# Patient Record
Sex: FEMALE | Race: WHITE
[De-identification: names, ages, dates, MRNs, and addresses within clinical notes are randomized per-mention and may not be internally consistent; named-entity substitution may affect disease eponyms.]

---

## 2017-01-19 ENCOUNTER — PRIOR ORIGINAL RECORDS (OUTPATIENT)
Dept: OTHER | Age: 52
End: 2017-01-19

## 2017-01-21 ENCOUNTER — LAB ENCOUNTER (OUTPATIENT)
Dept: LAB | Age: 52
End: 2017-01-21
Attending: INTERNAL MEDICINE
Payer: COMMERCIAL

## 2017-01-21 DIAGNOSIS — Z94.1 STATUS POST TRANSPLANT, HEART (HCC): Primary | ICD-10-CM

## 2017-01-21 PROCEDURE — 80197 ASSAY OF TACROLIMUS: CPT

## 2017-01-24 ENCOUNTER — PRIOR ORIGINAL RECORDS (OUTPATIENT)
Dept: OTHER | Age: 52
End: 2017-01-24

## 2017-01-24 RX ORDER — MELATONIN
1 2 TIMES DAILY
COMMUNITY

## 2017-01-24 RX ORDER — VALGANCICLOVIR 450 MG/1
450 TABLET, FILM COATED ORAL DAILY
COMMUNITY
End: 2017-09-07 | Stop reason: ALTCHOICE

## 2017-01-24 RX ORDER — MAGNESIUM OXIDE 400 MG (241.3 MG MAGNESIUM) TABLET
800 TABLET 2 TIMES DAILY
COMMUNITY

## 2017-01-24 RX ORDER — DOXEPIN HYDROCHLORIDE 50 MG/1
1 CAPSULE ORAL DAILY
COMMUNITY

## 2017-01-24 RX ORDER — ZINC SULFATE 50(220)MG
220 CAPSULE ORAL DAILY
COMMUNITY
End: 2017-09-07

## 2017-01-24 RX ORDER — PREDNISONE 1 MG/1
7.5 TABLET ORAL 2 TIMES DAILY WITH MEALS
COMMUNITY
End: 2017-09-07 | Stop reason: DRUGHIGH

## 2017-01-24 RX ORDER — MYCOPHENOLATE MOFETIL 500 MG/1
500 TABLET ORAL 2 TIMES DAILY
COMMUNITY
End: 2018-08-30

## 2017-01-24 RX ORDER — PANTOPRAZOLE SODIUM 40 MG/1
40 TABLET, DELAYED RELEASE ORAL
COMMUNITY
End: 2017-04-24

## 2017-01-24 RX ORDER — ERGOCALCIFEROL (VITAMIN D2) 1250 MCG
50000 CAPSULE ORAL WEEKLY
COMMUNITY
End: 2017-04-24

## 2017-01-24 RX ORDER — ATORVASTATIN CALCIUM 20 MG/1
20 TABLET, FILM COATED ORAL NIGHTLY
COMMUNITY

## 2017-01-24 RX ORDER — FUROSEMIDE 40 MG/1
40 TABLET ORAL DAILY
COMMUNITY
End: 2017-02-22

## 2017-01-24 RX ORDER — TACROLIMUS 0.5 MG/1
3.5 CAPSULE ORAL DAILY
COMMUNITY
End: 2017-04-24

## 2017-01-24 NOTE — H&P
: 1965  ACCOUNT:  [id number]  [phone]  PCP: Dr. Kindra Cooper     TODAY'S DATE: 2017  DICTATED BY:  Lili Verdugo M.D.]    CHIEF COMPLAINT: [S/P heart transplant.]    HPI: [On 2017, Gideon Ingram, a 46year old female, prese or dizziness. HEM/LYMPH: easy bruising. ALL: no new food or enviornmental allergies.       PAST HISTORY: hypothyroidism, cholecystectomy 1990 and lithotripsy 1995    PAST CV HISTORY: [address] ICD 2014, cardiac catheterization January 2016, cat folate deficiency anemias  10. Other nonrheumatic tricuspid valve disorders  11. Other restrictive cardiomyopathy  12. Thrombcytopenia, Heparin-induced  13.  Tricuspid regurgitation, S/P reapir at [facility] [address] [facility]:  [We have scheduled Landon for right ventricula tablet daily                           Palak Izaguirre M.D.  FROYLAN/tere - DD: 01/19/2017 - DT: 01/20/2017 - Job ID: [id number] - c: Dr. Pérez Forth     No changes, plans for biopsy today.   [employer] NP      Palak Izaguirre M.D., F.A.C.C., F.A.H.A.,

## 2017-01-25 ENCOUNTER — PRIOR ORIGINAL RECORDS (OUTPATIENT)
Dept: OTHER | Age: 52
End: 2017-01-25

## 2017-01-25 ENCOUNTER — HOSPITAL ENCOUNTER (OUTPATIENT)
Dept: LAB | Facility: HOSPITAL | Age: 52
Discharge: HOME | End: 2017-01-25
Attending: INTERNAL MEDICINE
Payer: COMMERCIAL

## 2017-01-25 LAB
ALBUMIN SERPL-MCNC: 2.9 G/DL (ref 3.5–4.8)
ALP LIVER SERPL-CCNC: 266 U/L (ref 41–108)
ALT SERPL-CCNC: 50 U/L (ref 14–54)
AST SERPL-CCNC: 30 U/L (ref 15–41)
BASOPHILS # BLD AUTO: 0.02 X10(3) UL (ref 0–0.1)
BASOPHILS NFR BLD AUTO: 0.2 %
BILIRUB SERPL-MCNC: 0.5 MG/DL (ref 0.1–2)
BUN BLD-MCNC: 53 MG/DL (ref 8–20)
CALCIUM BLD-MCNC: 8.6 MG/DL (ref 8.3–10.3)
CHLORIDE: 105 MMOL/L (ref 101–111)
CO2: 30 MMOL/L (ref 22–32)
CREAT BLD-MCNC: 2.65 MG/DL (ref 0.55–1.02)
EOSINOPHIL # BLD AUTO: 0.01 X10(3) UL (ref 0–0.3)
EOSINOPHIL NFR BLD AUTO: 0.1 %
ERYTHROCYTE [DISTWIDTH] IN BLOOD BY AUTOMATED COUNT: 17.2 % (ref 11.5–16)
GLUCOSE BLD-MCNC: 99 MG/DL (ref 70–99)
HAV IGM SER QL: 1.7 MG/DL (ref 1.7–3)
HCT VFR BLD AUTO: 30.9 % (ref 34–50)
HGB BLD-MCNC: 9.8 G/DL (ref 12–16)
IMMATURE GRANULOCYTE COUNT: 0.13 X10(3) UL (ref 0–1)
IMMATURE GRANULOCYTE RATIO %: 1.1 %
LYMPHOCYTES # BLD AUTO: 0.72 X10(3) UL (ref 0.9–4)
LYMPHOCYTES NFR BLD AUTO: 6.3 %
M PROTEIN MFR SERPL ELPH: 6.7 G/DL (ref 6.1–8.3)
MCH RBC QN AUTO: 29.6 PG (ref 27–33.2)
MCHC RBC AUTO-ENTMCNC: 31.7 G/DL (ref 31–37)
MCV RBC AUTO: 93.4 FL (ref 81–100)
MONOCYTES # BLD AUTO: 0.23 X10(3) UL (ref 0.1–0.6)
MONOCYTES NFR BLD AUTO: 2 %
NEUTROPHIL ABS PRELIM: 10.34 X10 (3) UL (ref 1.3–6.7)
NEUTROPHILS # BLD AUTO: 10.34 X10(3) UL (ref 1.3–6.7)
NEUTROPHILS NFR BLD AUTO: 90.3 %
PHOSPHATE SERPL-MCNC: 3 MG/DL (ref 2.5–4.9)
PLATELET # BLD AUTO: 221 10(3)UL (ref 150–450)
POTASSIUM SERPL-SCNC: 4.4 MMOL/L (ref 3.6–5.1)
RBC # BLD AUTO: 3.31 X10(6)UL (ref 3.8–5.1)
RED CELL DISTRIBUTION WIDTH-SD: 58.2 FL (ref 35.1–46.3)
SODIUM SERPL-SCNC: 142 MMOL/L (ref 136–144)
WBC # BLD AUTO: 11.5 X10(3) UL (ref 4–13)

## 2017-01-25 PROCEDURE — 85025 COMPLETE CBC W/AUTO DIFF WBC: CPT | Performed by: INTERNAL MEDICINE

## 2017-01-25 PROCEDURE — 83735 ASSAY OF MAGNESIUM: CPT | Performed by: INTERNAL MEDICINE

## 2017-01-25 PROCEDURE — 80197 ASSAY OF TACROLIMUS: CPT | Performed by: INTERNAL MEDICINE

## 2017-01-25 PROCEDURE — 36415 COLL VENOUS BLD VENIPUNCTURE: CPT | Performed by: INTERNAL MEDICINE

## 2017-01-25 PROCEDURE — 80053 COMPREHEN METABOLIC PANEL: CPT | Performed by: INTERNAL MEDICINE

## 2017-01-25 PROCEDURE — 84100 ASSAY OF PHOSPHORUS: CPT | Performed by: INTERNAL MEDICINE

## 2017-01-26 ENCOUNTER — HOSPITAL ENCOUNTER (OUTPATIENT)
Dept: INTERVENTIONAL RADIOLOGY/VASCULAR | Facility: HOSPITAL | Age: 52
Discharge: HOME | End: 2017-01-26
Attending: INTERNAL MEDICINE | Admitting: INTERNAL MEDICINE
Payer: COMMERCIAL

## 2017-01-26 VITALS
WEIGHT: 172 LBS | OXYGEN SATURATION: 99 % | HEIGHT: 63 IN | HEART RATE: 77 BPM | DIASTOLIC BLOOD PRESSURE: 67 MMHG | BODY MASS INDEX: 30.48 KG/M2 | TEMPERATURE: 98 F | RESPIRATION RATE: 17 BRPM | SYSTOLIC BLOOD PRESSURE: 136 MMHG

## 2017-01-26 DIAGNOSIS — I50.9 HF (HEART FAILURE) (HCC): ICD-10-CM

## 2017-01-26 DIAGNOSIS — Z94.1 HEART TRANSPLANTED (HCC): ICD-10-CM

## 2017-01-26 LAB — TACROLIMUS: 10.1 NG/ML

## 2017-01-26 PROCEDURE — 88348 ELECTRON MICROSCOPY DX: CPT | Performed by: INTERNAL MEDICINE

## 2017-01-26 PROCEDURE — 93451 RIGHT HEART CATH: CPT

## 2017-01-26 PROCEDURE — 02BK3ZX EXCISION OF RIGHT VENTRICLE, PERCUTANEOUS APPROACH, DIAGNOSTIC: ICD-10-PCS | Performed by: COUNSELOR

## 2017-01-26 PROCEDURE — 88307 TISSUE EXAM BY PATHOLOGIST: CPT | Performed by: INTERNAL MEDICINE

## 2017-01-26 PROCEDURE — 4A023N6 MEASUREMENT OF CARDIAC SAMPLING AND PRESSURE, RIGHT HEART, PERCUTANEOUS APPROACH: ICD-10-PCS | Performed by: COUNSELOR

## 2017-01-26 PROCEDURE — 99152 MOD SED SAME PHYS/QHP 5/>YRS: CPT

## 2017-01-26 PROCEDURE — 93505 ENDOMYOCARDIAL BIOPSY: CPT

## 2017-01-26 RX ORDER — HEPARIN SODIUM 5000 [USP'U]/ML
INJECTION, SOLUTION INTRAVENOUS; SUBCUTANEOUS
Status: DISCONTINUED
Start: 2017-01-26 | End: 2017-01-26 | Stop reason: WASHOUT

## 2017-01-26 RX ORDER — SODIUM CHLORIDE 9 MG/ML
INJECTION, SOLUTION INTRAVENOUS CONTINUOUS
Status: DISCONTINUED | OUTPATIENT
Start: 2017-01-26 | End: 2017-01-26

## 2017-01-26 RX ORDER — LIDOCAINE HYDROCHLORIDE 10 MG/ML
INJECTION, SOLUTION INFILTRATION; PERINEURAL
Status: COMPLETED
Start: 2017-01-26 | End: 2017-01-26

## 2017-01-26 RX ORDER — MIDAZOLAM HYDROCHLORIDE 1 MG/ML
INJECTION INTRAMUSCULAR; INTRAVENOUS
Status: COMPLETED
Start: 2017-01-26 | End: 2017-01-26

## 2017-01-26 RX ADMIN — SODIUM CHLORIDE: 9 INJECTION, SOLUTION INTRAVENOUS at 08:15:00

## 2017-01-26 NOTE — OPERATIVE REPORT
RHC with biopsy    Procedures:    Right Heart Catheterization  Right Ventricular Endomyocardial Biopsy    Indication:  Rejection Surveillance and Post-OHT Hemodynamic evaluation    Access Site: Right Internal Jugular Vein    Sheath Size (Fr): 7    Complica

## 2017-01-26 NOTE — PROGRESS NOTES
Rc'd pt from cath lab in stable condition. VSS. See flowsheet. Pt denies c/o pain or discomfort. Dressing to right neck with scant drop of serrous sangenous drainage. No increase in drainage throughout stay. Skin soft, no hematoma.  Pt ambulated to [facility]

## 2017-01-27 ENCOUNTER — PRIOR ORIGINAL RECORDS (OUTPATIENT)
Dept: OTHER | Age: 52
End: 2017-01-27

## 2017-01-31 ENCOUNTER — PRIOR ORIGINAL RECORDS (OUTPATIENT)
Dept: OTHER | Age: 52
End: 2017-01-31

## 2017-02-01 LAB
ALBUMIN: 2.9 G/DL
ALKALINE PHOSPHATATE(ALK PHOS): 266 IU/L
BILIRUBIN TOTAL: 0.5 MG/DL
BUN: 53 MG/DL
CALCIUM: 8.6 MG/DL
CHLORIDE: 105 MEQ/L
CREATININE, SERUM: 2.65 MG/DL
GLUCOSE: 99 MG/DL
HEMATOCRIT: 30.9 %
HEMOGLOBIN: 9.8 G/DL
MAGNESIUM: 1.7 MG/DL
PLATELETS: 221 K/UL
POTASSIUM, SERUM: 4.4 MEQ/L
PROTEIN, TOTAL: 6.7 G/DL
RED BLOOD COUNT: 3.31 X 10-6/U
SGOT (AST): 30 IU/L
SGPT (ALT): 50 IU/L
SODIUM: 142 MEQ/L
TACROLIMUS TROUGH LEVEL: 10.1
WHITE BLOOD COUNT: 11.5 X 10-3/U

## 2017-02-02 ENCOUNTER — PRIOR ORIGINAL RECORDS (OUTPATIENT)
Dept: OTHER | Age: 52
End: 2017-02-02

## 2017-02-03 ENCOUNTER — PRIOR ORIGINAL RECORDS (OUTPATIENT)
Dept: OTHER | Age: 52
End: 2017-02-03

## 2017-02-03 ENCOUNTER — HOSPITAL ENCOUNTER (OUTPATIENT)
Dept: LAB | Facility: HOSPITAL | Age: 52
Discharge: HOME | End: 2017-02-03
Attending: NURSE PRACTITIONER
Payer: COMMERCIAL

## 2017-02-03 LAB
BUN BLD-MCNC: 45 MG/DL (ref 8–20)
CALCIUM BLD-MCNC: 8.5 MG/DL (ref 8.3–10.3)
CHLORIDE: 109 MMOL/L (ref 101–111)
CO2: 33 MMOL/L (ref 22–32)
CREAT BLD-MCNC: 2 MG/DL (ref 0.55–1.02)
GLUCOSE BLD-MCNC: 101 MG/DL (ref 70–99)
POTASSIUM SERPL-SCNC: 4.6 MMOL/L (ref 3.6–5.1)
SODIUM SERPL-SCNC: 146 MMOL/L (ref 136–144)
T3 SERPL-MCNC: 32 NG/DL (ref 60–181)
T3FREE SERPL-MCNC: 1.31 PG/ML (ref 2.3–4.2)
THYROXINE (T4): 6.3 UG/DL (ref 4.5–10.9)
TROPONIN: 0.06 NG/ML (ref ?–0.05)
TSI SER-ACNC: 2.42 MIU/ML (ref 0.35–5.5)

## 2017-02-03 PROCEDURE — 80048 BASIC METABOLIC PNL TOTAL CA: CPT | Performed by: INTERNAL MEDICINE

## 2017-02-03 PROCEDURE — 84436 ASSAY OF TOTAL THYROXINE: CPT | Performed by: INTERNAL MEDICINE

## 2017-02-03 PROCEDURE — 36415 COLL VENOUS BLD VENIPUNCTURE: CPT | Performed by: INTERNAL MEDICINE

## 2017-02-03 PROCEDURE — 84484 ASSAY OF TROPONIN QUANT: CPT | Performed by: INTERNAL MEDICINE

## 2017-02-03 PROCEDURE — 80197 ASSAY OF TACROLIMUS: CPT | Performed by: INTERNAL MEDICINE

## 2017-02-03 PROCEDURE — 84443 ASSAY THYROID STIM HORMONE: CPT | Performed by: INTERNAL MEDICINE

## 2017-02-03 PROCEDURE — 84481 FREE ASSAY (FT-3): CPT | Performed by: INTERNAL MEDICINE

## 2017-02-03 PROCEDURE — 84480 ASSAY TRIIODOTHYRONINE (T3): CPT | Performed by: INTERNAL MEDICINE

## 2017-02-04 LAB — TACROLIMUS: 12.3 NG/ML

## 2017-02-07 ENCOUNTER — PRIOR ORIGINAL RECORDS (OUTPATIENT)
Dept: OTHER | Age: 52
End: 2017-02-07

## 2017-02-10 LAB
BUN: 45 MG/DL
CALCIUM: 8.5 MG/DL
CHLORIDE: 109 MEQ/L
CREATININE, SERUM: 2 MG/DL
GLUCOSE: 101 MG/DL
POTASSIUM, SERUM: 4.6 MEQ/L
SODIUM: 146 MEQ/L
TACROLIMUS TROUGH LEVEL: 12.3

## 2017-02-13 LAB
T4(THYROXINE): 6.3 MG/DL
THYROID STIMULATING HORMONE: 2.42 MLU/L

## 2017-02-18 ENCOUNTER — LAB ENCOUNTER (OUTPATIENT)
Dept: LAB | Facility: HOSPITAL | Age: 52
End: 2017-02-18
Attending: NURSE PRACTITIONER
Payer: COMMERCIAL

## 2017-02-18 ENCOUNTER — PRIOR ORIGINAL RECORDS (OUTPATIENT)
Dept: OTHER | Age: 52
End: 2017-02-18

## 2017-02-18 DIAGNOSIS — Z94.1 HEART REPLACED BY TRANSPLANT (HCC): Primary | ICD-10-CM

## 2017-02-18 LAB
ALBUMIN SERPL-MCNC: 2.8 G/DL (ref 3.5–4.8)
ALP LIVER SERPL-CCNC: 159 U/L (ref 41–108)
ALT SERPL-CCNC: 33 U/L (ref 14–54)
AST SERPL-CCNC: 24 U/L (ref 15–41)
BAND %: 8 %
BASOPHIL % MANUAL: 0 %
BASOPHIL ABSOLUTE MANUAL: 0 X10(3) UL (ref 0–0.1)
BILIRUB SERPL-MCNC: 0.5 MG/DL (ref 0.1–2)
BUN BLD-MCNC: 35 MG/DL (ref 8–20)
CALCIUM BLD-MCNC: 8.1 MG/DL (ref 8.3–10.3)
CHLORIDE: 106 MMOL/L (ref 101–111)
CO2: 33 MMOL/L (ref 22–32)
CREAT BLD-MCNC: 1.57 MG/DL (ref 0.55–1.02)
EOSINOPHIL % MANUAL: 2 %
EOSINOPHIL ABSOLUTE MANUAL: 0.13 X10(3) UL (ref 0–0.3)
ERYTHROCYTE [DISTWIDTH] IN BLOOD BY AUTOMATED COUNT: 15.9 % (ref 11.5–16)
GLUCOSE BLD-MCNC: 110 MG/DL (ref 70–99)
HCT VFR BLD AUTO: 31.6 % (ref 34–50)
HGB BLD-MCNC: 9.7 G/DL (ref 12–16)
LYMPHOCYTE % MANUAL: 9 %
LYMPHOCYTE ABSOLUTE MANUAL: 0.59 X10(3) UL (ref 0.9–4)
M PROTEIN MFR SERPL ELPH: 6.3 G/DL (ref 6.1–8.3)
MCH RBC QN AUTO: 29.4 PG (ref 27–33.2)
MCHC RBC AUTO-ENTMCNC: 30.7 G/DL (ref 31–37)
MCV RBC AUTO: 95.8 FL (ref 81–100)
MONOCYTE % MANUAL: 6 %
MONOCYTE ABSOLUTE MANUAL: 0.4 X10(3) UL (ref 0.1–0.6)
MORPHOLOGY: NORMAL
NEUTROPHIL ABS PRELIM: 4.92 X10 (3) UL (ref 1.3–6.7)
NEUTROPHIL ABSOLUTE MANUAL: 5.48 X10(3) UL (ref 1.3–6.7)
NEUTROPHILS % MANUAL: 75 %
PLATELET # BLD AUTO: 217 10(3)UL (ref 150–450)
PLATELET MORPHOLOGY: NORMAL
POTASSIUM SERPL-SCNC: 4.6 MMOL/L (ref 3.6–5.1)
RBC # BLD AUTO: 3.3 X10(6)UL (ref 3.8–5.1)
RED CELL DISTRIBUTION WIDTH-SD: 56.5 FL (ref 35.1–46.3)
SODIUM SERPL-SCNC: 144 MMOL/L (ref 136–144)
TOTAL CELLS COUNTED: 100
WBC # BLD AUTO: 6.6 X10(3) UL (ref 4–13)

## 2017-02-18 PROCEDURE — 85027 COMPLETE CBC AUTOMATED: CPT

## 2017-02-18 PROCEDURE — 80197 ASSAY OF TACROLIMUS: CPT

## 2017-02-18 PROCEDURE — 36415 COLL VENOUS BLD VENIPUNCTURE: CPT

## 2017-02-18 PROCEDURE — 80053 COMPREHEN METABOLIC PANEL: CPT

## 2017-02-18 PROCEDURE — 85007 BL SMEAR W/DIFF WBC COUNT: CPT

## 2017-02-19 LAB — TACROLIMUS: 8.7 NG/ML

## 2017-02-20 NOTE — HISTORICAL OFFICE NOTE
Dinesh Tavarez  : 1965  ACCOUNT:  [id number]  [phone]  PCP: Dr. Wendy Arredondo     TODAY'S DATE: 2017  DICTATED BY:  Cherise Gonzalez M.D.]    CHIEF COMPLAINT: [S/P heart transplant.]    HPI: [On 2017, Karyn Hess, a 46year old f lightheadedness or dizziness. HEM/LYMPH: easy bruising. ALL: no new food or enviornmental allergies.       PAST HISTORY: hypothyroidism, cholecystectomy 1990 and lithotripsy 1995    PAST CV HISTORY: [employer] ICD 2014, cardiac catheterization J dependent  9. Other folate deficiency anemias  10. Other nonrheumatic tricuspid valve disorders  11. Other restrictive cardiomyopathy  12. Thrombcytopenia, Heparin-induced  13.  Tricuspid regurgitation, S/P reapir at [facility] 97:  [We have scheduled Landry Ribeiro 1MG       one tablet daily                           Palak Izaguirre M.D.  FROYLAN/tere - DD: 01/19/2017 - DT: 01/20/2017 - Job ID: [id number] - c: Dr. Elisa Gonzales

## 2017-02-21 ENCOUNTER — PRIOR ORIGINAL RECORDS (OUTPATIENT)
Dept: OTHER | Age: 52
End: 2017-02-21

## 2017-02-21 ENCOUNTER — LAB ENCOUNTER (OUTPATIENT)
Dept: LAB | Facility: HOSPITAL | Age: 52
End: 2017-02-21
Attending: INTERNAL MEDICINE
Payer: COMMERCIAL

## 2017-02-21 ENCOUNTER — HOSPITAL ENCOUNTER (OUTPATIENT)
Dept: GENERAL RADIOLOGY | Facility: HOSPITAL | Age: 52
Discharge: HOME | End: 2017-02-21
Attending: INTERNAL MEDICINE
Payer: COMMERCIAL

## 2017-02-21 DIAGNOSIS — Z94.1 HEART REPLACED BY TRANSPLANT (HCC): ICD-10-CM

## 2017-02-21 DIAGNOSIS — I50.40 COMBINED SYSTOLIC AND DIASTOLIC HEART FAILURE (HCC): ICD-10-CM

## 2017-02-21 DIAGNOSIS — I51.9 MYXEDEMA HEART DISEASE: Primary | ICD-10-CM

## 2017-02-21 DIAGNOSIS — E03.9 MYXEDEMA HEART DISEASE: Primary | ICD-10-CM

## 2017-02-21 DIAGNOSIS — E03.9 MYXEDEMA HEART DISEASE: ICD-10-CM

## 2017-02-21 DIAGNOSIS — E10.9 DIABETES MELLITUS TYPE I (HCC): Primary | ICD-10-CM

## 2017-02-21 DIAGNOSIS — I51.9 MYXEDEMA HEART DISEASE: ICD-10-CM

## 2017-02-21 LAB
ALBUMIN SERPL-MCNC: 2.9 G/DL (ref 3.5–4.8)
ALP LIVER SERPL-CCNC: 184 U/L (ref 41–108)
ALT SERPL-CCNC: 48 U/L (ref 14–54)
AST SERPL-CCNC: 37 U/L (ref 15–41)
ATRIAL RATE: 73 BPM
BAND %: 4 %
BASOPHIL % MANUAL: 0 %
BASOPHIL ABSOLUTE MANUAL: 0 X10(3) UL (ref 0–0.1)
BILIRUB SERPL-MCNC: 0.5 MG/DL (ref 0.1–2)
BUN BLD-MCNC: 33 MG/DL (ref 8–20)
CALCIUM BLD-MCNC: 8.7 MG/DL (ref 8.3–10.3)
CHLORIDE: 109 MMOL/L (ref 101–111)
CO2: 31 MMOL/L (ref 22–32)
CREAT BLD-MCNC: 1.49 MG/DL (ref 0.55–1.02)
EOSINOPHIL % MANUAL: 3 %
EOSINOPHIL ABSOLUTE MANUAL: 0.17 X10(3) UL (ref 0–0.3)
ERYTHROCYTE [DISTWIDTH] IN BLOOD BY AUTOMATED COUNT: 15.9 % (ref 11.5–16)
GLUCOSE BLD-MCNC: 95 MG/DL (ref 70–99)
HAV IGM SER QL: 2.1 MG/DL (ref 1.7–3)
HCT VFR BLD AUTO: 31 % (ref 34–50)
HGB BLD-MCNC: 9.6 G/DL (ref 12–16)
LYMPHOCYTE % MANUAL: 13 %
LYMPHOCYTE ABSOLUTE MANUAL: 0.73 X10(3) UL (ref 0.9–4)
M PROTEIN MFR SERPL ELPH: 6.6 G/DL (ref 6.1–8.3)
MCH RBC QN AUTO: 29.5 PG (ref 27–33.2)
MCHC RBC AUTO-ENTMCNC: 31 G/DL (ref 31–37)
MCV RBC AUTO: 95.4 FL (ref 81–100)
MONOCYTE % MANUAL: 0 %
MONOCYTE ABSOLUTE MANUAL: 0 X10(3) UL (ref 0.1–0.6)
MORPHOLOGY: NORMAL
NEUTROPHIL ABS PRELIM: 4.1 X10 (3) UL (ref 1.3–6.7)
NEUTROPHIL ABSOLUTE MANUAL: 4.7 X10(3) UL (ref 1.3–6.7)
NEUTROPHILS % MANUAL: 80 %
P AXIS: -4 DEGREES
P-R INTERVAL: 148 MS
PHOSPHATE SERPL-MCNC: 2.7 MG/DL (ref 2.5–4.9)
PLATELET # BLD AUTO: 194 10(3)UL (ref 150–450)
PLATELET MORPHOLOGY: NORMAL
POTASSIUM SERPL-SCNC: 4.6 MMOL/L (ref 3.6–5.1)
Q-T INTERVAL: 398 MS
QRS DURATION: 130 MS
QTC CALCULATION (BEZET): 438 MS
R AXIS: -35 DEGREES
RBC # BLD AUTO: 3.25 X10(6)UL (ref 3.8–5.1)
RED CELL DISTRIBUTION WIDTH-SD: 55.6 FL (ref 35.1–46.3)
SODIUM SERPL-SCNC: 145 MMOL/L (ref 136–144)
T AXIS: 18 DEGREES
TOTAL CELLS COUNTED: 100
VENTRICULAR RATE: 73 BPM
WBC # BLD AUTO: 5.6 X10(3) UL (ref 4–13)

## 2017-02-21 PROCEDURE — 80180 DRUG SCRN QUAN MYCOPHENOLATE: CPT

## 2017-02-21 PROCEDURE — 83735 ASSAY OF MAGNESIUM: CPT

## 2017-02-21 PROCEDURE — 93005 ELECTROCARDIOGRAM TRACING: CPT

## 2017-02-21 PROCEDURE — 84100 ASSAY OF PHOSPHORUS: CPT

## 2017-02-21 PROCEDURE — 80197 ASSAY OF TACROLIMUS: CPT

## 2017-02-21 PROCEDURE — 71020 XR CHEST PA + LAT CHEST (CPT=71020): CPT

## 2017-02-21 PROCEDURE — 93010 ELECTROCARDIOGRAM REPORT: CPT | Performed by: INTERNAL MEDICINE

## 2017-02-21 PROCEDURE — 36415 COLL VENOUS BLD VENIPUNCTURE: CPT

## 2017-02-21 PROCEDURE — 80053 COMPREHEN METABOLIC PANEL: CPT

## 2017-02-21 PROCEDURE — 85027 COMPLETE CBC AUTOMATED: CPT

## 2017-02-21 PROCEDURE — 85007 BL SMEAR W/DIFF WBC COUNT: CPT

## 2017-02-22 RX ORDER — TACROLIMUS 1 MG/1
3 CAPSULE ORAL 2 TIMES DAILY
COMMUNITY
End: 2018-08-30

## 2017-02-22 RX ORDER — FUROSEMIDE 20 MG/1
40 TABLET ORAL DAILY
Status: ON HOLD | COMMUNITY
End: 2017-04-25

## 2017-02-23 ENCOUNTER — APPOINTMENT (OUTPATIENT)
Dept: CV DIAGNOSTICS | Facility: HOSPITAL | Age: 52
End: 2017-02-23
Attending: INTERNAL MEDICINE
Payer: COMMERCIAL

## 2017-02-23 ENCOUNTER — HOSPITAL ENCOUNTER (OUTPATIENT)
Dept: INTERVENTIONAL RADIOLOGY/VASCULAR | Facility: HOSPITAL | Age: 52
Discharge: HOME | End: 2017-02-23
Attending: INTERNAL MEDICINE | Admitting: INTERNAL MEDICINE
Payer: COMMERCIAL

## 2017-02-23 VITALS
HEART RATE: 74 BPM | TEMPERATURE: 98 F | RESPIRATION RATE: 15 BRPM | SYSTOLIC BLOOD PRESSURE: 160 MMHG | WEIGHT: 175 LBS | OXYGEN SATURATION: 94 % | DIASTOLIC BLOOD PRESSURE: 70 MMHG | BODY MASS INDEX: 32.2 KG/M2 | HEIGHT: 62 IN

## 2017-02-23 DIAGNOSIS — Z94.1 HEART TRANSPLANTED (HCC): ICD-10-CM

## 2017-02-23 LAB
MYCOPHENOLIC ACID ACYL-GLUCURONIDE: 2.4 UG/ML
MYCOPHENOLIC ACID GLUCURONIDE: 181.3 UG/ML
MYCOPHENOLIC ACID: 16.6 UG/ML
TACROLIMUS: 50 NG/ML

## 2017-02-23 PROCEDURE — 93306 TTE W/DOPPLER COMPLETE: CPT | Performed by: INTERNAL MEDICINE

## 2017-02-23 PROCEDURE — 93451 RIGHT HEART CATH: CPT

## 2017-02-23 PROCEDURE — 99153 MOD SED SAME PHYS/QHP EA: CPT

## 2017-02-23 PROCEDURE — 02BK3ZX EXCISION OF RIGHT VENTRICLE, PERCUTANEOUS APPROACH, DIAGNOSTIC: ICD-10-PCS | Performed by: INTERNAL MEDICINE

## 2017-02-23 PROCEDURE — 99152 MOD SED SAME PHYS/QHP 5/>YRS: CPT

## 2017-02-23 PROCEDURE — 93306 TTE W/DOPPLER COMPLETE: CPT

## 2017-02-23 PROCEDURE — 4A023N6 MEASUREMENT OF CARDIAC SAMPLING AND PRESSURE, RIGHT HEART, PERCUTANEOUS APPROACH: ICD-10-PCS | Performed by: INTERNAL MEDICINE

## 2017-02-23 PROCEDURE — 93505 ENDOMYOCARDIAL BIOPSY: CPT

## 2017-02-23 RX ORDER — MIDAZOLAM HYDROCHLORIDE 1 MG/ML
INJECTION INTRAMUSCULAR; INTRAVENOUS
Status: COMPLETED
Start: 2017-02-23 | End: 2017-02-23

## 2017-02-23 RX ORDER — SODIUM CHLORIDE 9 MG/ML
INJECTION, SOLUTION INTRAVENOUS CONTINUOUS
Status: DISCONTINUED | OUTPATIENT
Start: 2017-02-23 | End: 2017-02-23

## 2017-02-23 RX ORDER — LIDOCAINE HYDROCHLORIDE 10 MG/ML
INJECTION, SOLUTION INFILTRATION; PERINEURAL
Status: COMPLETED
Start: 2017-02-23 | End: 2017-02-23

## 2017-02-23 RX ADMIN — SODIUM CHLORIDE 500 ML: 9 INJECTION, SOLUTION INTRAVENOUS at 09:20:00

## 2017-02-23 NOTE — H&P
History & Physical Examination    Patient Name: Angela Botello  MRN: [id number]  CSN: [id number]  YOB: 1965    Diagnosis: Hx Heart transplant 12/15/16, PH, R [facility] [address] [employer] ICD, s/p tricuspid regurgitation with prior repair     History of Pre Atovaquone (MEPRON OR) Take 1,500 mg by mouth daily. Disp:  Rfl:  2/22/2017 at pm   multivitamin Oral Tab Take 1 tablet by mouth daily. Disp:  Rfl:  2/22/2017 at am   Mycophenolate Mofetil 500 MG Oral Tab Take 1,000 mg by mouth 2 (two) times daily.  Disp: vfib hx   • Right-sided heart failure (HCC)    • Anemia    • Chronic atrial fibrillation (HCC)    • Heart murmur    • Congestive heart disease ([address]      right side heart failure left side heart block    • History of blood transfusion      5/2016   • PONV [] + 1 bilateral edema in the ankles   OTHER        Plan: RHC and right ventricular endomyocardial biopsy.     ( x)  I have discussed the risks,  benefits, and alternatives with the patient/family, they understand and agree to proceed with plan of care, as

## 2017-02-23 NOTE — OPERATIVE REPORT
RHC with biopsy    Procedures:    Right Heart Catheterization  Right Ventricular Endomyocardial Biopsy    Indication:  Rejection Surveillance  Access Site: Right Internal Jugular Vein    Sheath Size (Fr): 7    Complications:  None    Findings:  BP: 155/85

## 2017-02-23 NOTE — PROGRESS NOTES
Echo done after the procedure, all discharge information explained to the patient and family. IV removed, patient went home in stable condition with her family.

## 2017-02-27 ENCOUNTER — PRIOR ORIGINAL RECORDS (OUTPATIENT)
Dept: OTHER | Age: 52
End: 2017-02-27

## 2017-02-28 ENCOUNTER — PRIOR ORIGINAL RECORDS (OUTPATIENT)
Dept: OTHER | Age: 52
End: 2017-02-28

## 2017-02-28 ENCOUNTER — HOSPITAL ENCOUNTER (OUTPATIENT)
Dept: LAB | Facility: HOSPITAL | Age: 52
Discharge: HOME | End: 2017-02-28
Attending: INTERNAL MEDICINE
Payer: COMMERCIAL

## 2017-02-28 PROCEDURE — 36415 COLL VENOUS BLD VENIPUNCTURE: CPT | Performed by: INTERNAL MEDICINE

## 2017-02-28 PROCEDURE — 80197 ASSAY OF TACROLIMUS: CPT | Performed by: INTERNAL MEDICINE

## 2017-02-28 PROCEDURE — 80180 DRUG SCRN QUAN MYCOPHENOLATE: CPT | Performed by: INTERNAL MEDICINE

## 2017-03-01 LAB — TACROLIMUS: 10.9 NG/ML

## 2017-03-02 ENCOUNTER — PRIOR ORIGINAL RECORDS (OUTPATIENT)
Dept: OTHER | Age: 52
End: 2017-03-02

## 2017-03-02 LAB
MPA GLUCURONIDE: 181.3
MYCOPHENOLIC ACID ACYL-GLUCURONIDE: <1 UG/ML
MYCOPHENOLIC ACID GLUCURONIDE: 61.6 UG/ML
MYCOPHENOLIC ACID LEVEL: 16.6
MYCOPHENOLIC ACID: 1.4 UG/ML
TACROLIMUS TROUGH LEVEL: 50

## 2017-03-03 ENCOUNTER — HOSPITAL ENCOUNTER (OUTPATIENT)
Dept: LAB | Facility: HOSPITAL | Age: 52
Discharge: HOME | End: 2017-03-03
Attending: NURSE PRACTITIONER
Payer: COMMERCIAL

## 2017-03-03 PROCEDURE — 87040 BLOOD CULTURE FOR BACTERIA: CPT

## 2017-03-03 PROCEDURE — 36415 COLL VENOUS BLD VENIPUNCTURE: CPT

## 2017-03-07 LAB
ALBUMIN: 2.8 G/DL
ALBUMIN: 2.9 G/DL
ALKALINE PHOSPHATATE(ALK PHOS): 159 IU/L
ALKALINE PHOSPHATATE(ALK PHOS): 184 IU/L
BILIRUBIN TOTAL: 0.5 MG/DL
BILIRUBIN TOTAL: 0.5 MG/DL
BUN: 33 MG/DL
BUN: 35 MG/DL
CALCIUM: 8.1 MG/DL
CALCIUM: 8.7 MG/DL
CHLORIDE: 106 MEQ/L
CHLORIDE: 109 MEQ/L
CREATININE, SERUM: 1.49 MG/DL
CREATININE, SERUM: 1.57 MG/DL
GLUCOSE: 110 MG/DL
GLUCOSE: 95 MG/DL
HEMATOCRIT: 31 %
HEMATOCRIT: 31.6 %
HEMOGLOBIN: 9.6 G/DL
HEMOGLOBIN: 9.7 G/DL
MPA GLUCURONIDE: 61.6
MYCOPHENOLIC ACID LEVEL: 1.4
PLATELETS: 194 K/UL
PLATELETS: 217 K/UL
POTASSIUM, SERUM: 4.6 MEQ/L
POTASSIUM, SERUM: 4.6 MEQ/L
PROTEIN, TOTAL: 6.3 G/DL
PROTEIN, TOTAL: 6.6 G/DL
RED BLOOD COUNT: 3.25 X 10-6/U
RED BLOOD COUNT: 3.3 X 10-6/U
SGOT (AST): 24 IU/L
SGOT (AST): 37 IU/L
SGPT (ALT): 33 IU/L
SGPT (ALT): 48 IU/L
SODIUM: 144 MEQ/L
SODIUM: 145 MEQ/L
TACROLIMUS TROUGH LEVEL: 10.9
TACROLIMUS TROUGH LEVEL: 8.7
WHITE BLOOD COUNT: 5.6 X 10-3/U
WHITE BLOOD COUNT: 6.6 X 10-3/U

## 2017-03-11 ENCOUNTER — PRIOR ORIGINAL RECORDS (OUTPATIENT)
Dept: OTHER | Age: 52
End: 2017-03-11

## 2017-03-13 ENCOUNTER — PRIOR ORIGINAL RECORDS (OUTPATIENT)
Dept: OTHER | Age: 52
End: 2017-03-13

## 2017-03-17 ENCOUNTER — PRIOR ORIGINAL RECORDS (OUTPATIENT)
Dept: OTHER | Age: 52
End: 2017-03-17

## 2017-03-22 LAB
BUN: 36 MG/DL
BUN: 39 MG/DL
CALCIUM: 8.6 MG/DL
CALCIUM: 8.8 MG/DL
CHLORIDE: 105 MEQ/L
CHLORIDE: 107 MEQ/L
CREATININE, SERUM: 1.1 MG/DL
CREATININE, SERUM: 1.3 MG/DL
GLUCOSE: 111 MG/DL
GLUCOSE: 123 MG/DL
HEMATOCRIT: 29.5 %
HEMATOCRIT: 29.8 %
HEMOGLOBIN: 9 G/DL
HEMOGLOBIN: 9.2 G/DL
MAGNESIUM: 2.1 MG/DL
MAGNESIUM: 2.1 MG/DL
PLATELETS: 233 K/UL
PLATELETS: 235 K/UL
POTASSIUM, SERUM: 4.5 MEQ/L
POTASSIUM, SERUM: 4.9 MEQ/L
RED BLOOD COUNT: 3.14 X 10-6/U
RED BLOOD COUNT: 3.16 X 10-6/U
SODIUM: 143 MEQ/L
SODIUM: 144 MEQ/L
TACROLIMUS TROUGH LEVEL: 9.2
TACROLIMUS TROUGH LEVEL: 9.9
WHITE BLOOD COUNT: 5.6 X 10-3/U
WHITE BLOOD COUNT: 6.3 X 10-3/U

## 2017-03-27 ENCOUNTER — PRIOR ORIGINAL RECORDS (OUTPATIENT)
Dept: OTHER | Age: 52
End: 2017-03-27

## 2017-04-10 LAB
BUN: 35 MG/DL
CALCIUM: 8.6 MG/DL
CHLORIDE: 107 MEQ/L
CREATININE, SERUM: 1.4 MG/DL
GLUCOSE: 95 MG/DL
POTASSIUM, SERUM: 4.3 MEQ/L
SODIUM: 147 MEQ/L
TACROLIMUS TROUGH LEVEL: 10.1

## 2017-04-19 ENCOUNTER — MYAURORA ACCOUNT LINK (OUTPATIENT)
Dept: OTHER | Age: 52
End: 2017-04-19

## 2017-04-19 ENCOUNTER — HOSPITAL ENCOUNTER (OUTPATIENT)
Dept: CV DIAGNOSTICS | Facility: HOSPITAL | Age: 52
Discharge: HOME | End: 2017-04-19
Attending: INTERNAL MEDICINE

## 2017-04-19 DIAGNOSIS — Z94.1 H/O HEART TRANSPLANT (HCC): ICD-10-CM

## 2017-04-20 ENCOUNTER — PRIOR ORIGINAL RECORDS (OUTPATIENT)
Dept: OTHER | Age: 52
End: 2017-04-20

## 2017-04-24 RX ORDER — DEXLANSOPRAZOLE 60 MG/1
60 CAPSULE, DELAYED RELEASE ORAL DAILY
COMMUNITY
End: 2018-08-30

## 2017-04-24 RX ORDER — LEVOTHYROXINE SODIUM 88 UG/1
88 TABLET ORAL
COMMUNITY
End: 2017-09-07

## 2017-04-24 RX ORDER — DESOXIMETASONE 2.5 MG/G
1 CREAM TOPICAL 2 TIMES DAILY PRN
COMMUNITY

## 2017-04-24 RX ORDER — FOLIC ACID 1 MG/1
1 TABLET ORAL DAILY
COMMUNITY
End: 2018-03-14

## 2017-04-24 NOTE — HISTORICAL OFFICE NOTE
Mikhail Justina  : 1965  ACCOUNT:  [id number]  [phone]  PCP: Dr. Cleary Blazing     TODAY'S DATE: 2017  DICTATED BY:  Regina Pradhan M.D.]    CHIEF COMPLAINT: [S/P heart transplant.]    HPI: [On 2017, Rebeca Dimas, a 46year old f lightheadedness or dizziness. HEM/LYMPH: easy bruising. ALL: no new food or enviornmental allergies.       PAST HISTORY: hypothyroidism, cholecystectomy 1990 and lithotripsy 1995    PAST CV HISTORY: [employer] ICD 2014, cardiac catheterization J dependent  9. Other folate deficiency anemias  10. Other nonrheumatic tricuspid valve disorders  11. Other restrictive cardiomyopathy  12. Thrombcytopenia, Heparin-induced  13.  Tricuspid regurgitation, S/P reapir at [facility] 97:  [We have scheduled Amanda Lyle 1MG       one tablet daily                           JOSLYN Baker/tere - DD: 01/19/2017 - DT: 01/20/2017 - Job ID: [id number] - c: Dr. Erica White             Patient seen and examined. Agree with Above    Plan:   1.  [address] 12/15/2016,

## 2017-04-25 ENCOUNTER — PRIOR ORIGINAL RECORDS (OUTPATIENT)
Dept: OTHER | Age: 52
End: 2017-04-25

## 2017-04-25 ENCOUNTER — HOSPITAL ENCOUNTER (OUTPATIENT)
Dept: INTERVENTIONAL RADIOLOGY/VASCULAR | Facility: HOSPITAL | Age: 52
Discharge: HOME | End: 2017-04-25
Attending: INTERNAL MEDICINE | Admitting: INTERNAL MEDICINE
Payer: COMMERCIAL

## 2017-04-25 VITALS
TEMPERATURE: 99 F | OXYGEN SATURATION: 98 % | WEIGHT: 180 LBS | BODY MASS INDEX: 33.13 KG/M2 | HEIGHT: 62 IN | DIASTOLIC BLOOD PRESSURE: 64 MMHG | HEART RATE: 63 BPM | SYSTOLIC BLOOD PRESSURE: 131 MMHG | RESPIRATION RATE: 16 BRPM

## 2017-04-25 DIAGNOSIS — Z94.1 H/O HEART TRANSPLANT (HCC): ICD-10-CM

## 2017-04-25 DIAGNOSIS — Z94.1 HEART TRANSPLANTED (HCC): ICD-10-CM

## 2017-04-25 LAB
ALBUMIN: 3 G/DL
ALKALINE PHOSPHATATE(ALK PHOS): 120 IU/L
BILIRUBIN TOTAL: 0.3 MG/DL
PROTEIN, TOTAL: 5.8 G/DL
SGOT (AST): 26 IU/L
SGPT (ALT): 21 IU/L

## 2017-04-25 PROCEDURE — 36600 WITHDRAWAL OF ARTERIAL BLOOD: CPT | Performed by: INTERNAL MEDICINE

## 2017-04-25 PROCEDURE — 80053 COMPREHEN METABOLIC PANEL: CPT | Performed by: INTERNAL MEDICINE

## 2017-04-25 PROCEDURE — 80197 ASSAY OF TACROLIMUS: CPT | Performed by: INTERNAL MEDICINE

## 2017-04-25 PROCEDURE — 83050 HGB METHEMOGLOBIN QUAN: CPT | Performed by: INTERNAL MEDICINE

## 2017-04-25 PROCEDURE — 85027 COMPLETE CBC AUTOMATED: CPT | Performed by: INTERNAL MEDICINE

## 2017-04-25 PROCEDURE — 02BD3ZX EXCISION OF PAPILLARY MUSCLE, PERCUTANEOUS APPROACH, DIAGNOSTIC: ICD-10-PCS | Performed by: INTERNAL MEDICINE

## 2017-04-25 PROCEDURE — 85007 BL SMEAR W/DIFF WBC COUNT: CPT | Performed by: INTERNAL MEDICINE

## 2017-04-25 PROCEDURE — 80180 DRUG SCRN QUAN MYCOPHENOLATE: CPT | Performed by: INTERNAL MEDICINE

## 2017-04-25 PROCEDURE — 82803 BLOOD GASES ANY COMBINATION: CPT | Performed by: INTERNAL MEDICINE

## 2017-04-25 PROCEDURE — 88313 SPECIAL STAINS GROUP 2: CPT | Performed by: INTERNAL MEDICINE

## 2017-04-25 PROCEDURE — 99152 MOD SED SAME PHYS/QHP 5/>YRS: CPT

## 2017-04-25 PROCEDURE — 99153 MOD SED SAME PHYS/QHP EA: CPT

## 2017-04-25 PROCEDURE — 88307 TISSUE EXAM BY PATHOLOGIST: CPT | Performed by: INTERNAL MEDICINE

## 2017-04-25 PROCEDURE — 4A023N6 MEASUREMENT OF CARDIAC SAMPLING AND PRESSURE, RIGHT HEART, PERCUTANEOUS APPROACH: ICD-10-PCS | Performed by: INTERNAL MEDICINE

## 2017-04-25 PROCEDURE — 85018 HEMOGLOBIN: CPT | Performed by: INTERNAL MEDICINE

## 2017-04-25 PROCEDURE — 85025 COMPLETE CBC W/AUTO DIFF WBC: CPT | Performed by: INTERNAL MEDICINE

## 2017-04-25 PROCEDURE — 93505 ENDOMYOCARDIAL BIOPSY: CPT

## 2017-04-25 PROCEDURE — 82375 ASSAY CARBOXYHB QUANT: CPT | Performed by: INTERNAL MEDICINE

## 2017-04-25 PROCEDURE — 83735 ASSAY OF MAGNESIUM: CPT | Performed by: INTERNAL MEDICINE

## 2017-04-25 PROCEDURE — 88348 ELECTRON MICROSCOPY DX: CPT | Performed by: INTERNAL MEDICINE

## 2017-04-25 PROCEDURE — 93451 RIGHT HEART CATH: CPT

## 2017-04-25 RX ORDER — TORSEMIDE 20 MG/1
20 TABLET ORAL DAILY
Qty: 30 TABLET | Refills: 3 | Status: SHIPPED | OUTPATIENT
Start: 2017-04-25 | End: 2017-09-07 | Stop reason: ALTCHOICE

## 2017-04-25 RX ORDER — LIDOCAINE HYDROCHLORIDE 10 MG/ML
INJECTION, SOLUTION INFILTRATION; PERINEURAL
Status: COMPLETED
Start: 2017-04-25 | End: 2017-04-25

## 2017-04-25 RX ORDER — SODIUM CHLORIDE 9 MG/ML
INJECTION, SOLUTION INTRAVENOUS CONTINUOUS
Status: DISCONTINUED | OUTPATIENT
Start: 2017-04-25 | End: 2017-04-25

## 2017-04-25 RX ORDER — LOSARTAN POTASSIUM 25 MG/1
25 TABLET ORAL 2 TIMES DAILY
Qty: 60 TABLET | Refills: 0 | Status: SHIPPED | OUTPATIENT
Start: 2017-04-25 | End: 2018-06-29 | Stop reason: DRUGHIGH

## 2017-04-25 RX ORDER — MIDAZOLAM HYDROCHLORIDE 1 MG/ML
INJECTION INTRAMUSCULAR; INTRAVENOUS
Status: COMPLETED
Start: 2017-04-25 | End: 2017-04-25

## 2017-04-25 RX ADMIN — SODIUM CHLORIDE: 9 INJECTION, SOLUTION INTRAVENOUS at 09:32:00

## 2017-04-25 NOTE — H&P
[facility]    History and Physical    Margery Leventhal Patient Status:  Outpatient in a Bed    1965 MRN [id number]   Location [facility] [address] [facility] Attending Jono Galvin MD   Hosp Day # 0 PCP Yudi Courtney MD     Date: ANGIOGRAM      PACEMAKER/DEFIBRILLATOR  2008  &  2014    VALVE REPAIR  5/9/16    Comment Tricuspid    CARDIAC DEFIBRILLATOR PLACEMENT  1/2014    Comment Pix[place], biV placed at [facility], CAF    CARDIAC PACEMAKER PLACEMENT  2013    Comment [employer] daily.   Mycophenolate Mofetil 500 MG Oral Tab Take 1,000 mg by mouth 2 (two) times daily. nystatin [id number] UNIT/ML Mouth/Throat Suspension Take 5 mL by mouth 5 (five) times daily.      predniSONE 1 MG Oral Tab Take 7.5 mg by mouth 2 (two) times daily with

## 2017-04-25 NOTE — PROGRESS NOTES
Pt right neck jugular dressing cdi, no bleeding or hematoma. Pt iv removed. Pt sister driving pt home. Pt discharged home via wheelchair by Christopher Little pt with belongings and discharge instructions.

## 2017-04-25 NOTE — PROCEDURES
Miguel Pallas  4/14/1965    Procedure:  1) Right Heart Catheterization           2) Endomyocardial Biopsy           3) sedation     Reason for Procedure:  OHT     Procedure Summary:  1. Risks and Benefits were explained to patient.  Informed Consent was ob

## 2017-04-28 ENCOUNTER — PRIOR ORIGINAL RECORDS (OUTPATIENT)
Dept: OTHER | Age: 52
End: 2017-04-28

## 2017-05-04 ENCOUNTER — PRIOR ORIGINAL RECORDS (OUTPATIENT)
Dept: OTHER | Age: 52
End: 2017-05-04

## 2017-05-11 LAB
ALBUMIN: 3.1 G/DL
ALKALINE PHOSPHATATE(ALK PHOS): 101 IU/L
BILIRUBIN TOTAL: 0.5 MG/DL
BUN: 37 MG/DL
CALCIUM: 8.8 MG/DL
CHLORIDE: 106 MEQ/L
CREATININE, SERUM: 1.31 MG/DL
GLUCOSE: 92 MG/DL
HEMATOCRIT: 30.3 %
HEMOGLOBIN: 9.2 G/DL
MAGNESIUM: 2.1 MG/DL
MPA GLUCURONIDE: 74.7
MYCOPHENOLIC ACID LEVEL: 2.2
PLATELETS: 206 K/UL
POTASSIUM, SERUM: 4.1 MEQ/L
PROTEIN, TOTAL: 6.6 G/DL
RED BLOOD COUNT: 3.27 X 10-6/U
SGOT (AST): 25 IU/L
SGPT (ALT): 29 IU/L
SODIUM: 142 MEQ/L
TACROLIMUS TROUGH LEVEL: 7.6
WHITE BLOOD COUNT: 4.5 X 10-3/U

## 2017-05-23 ENCOUNTER — CARDPULM VISIT (OUTPATIENT)
Dept: CARDIAC REHAB | Facility: HOSPITAL | Age: 52
End: 2017-05-23
Attending: INTERNAL MEDICINE
Payer: COMMERCIAL

## 2017-05-23 ENCOUNTER — HOSPITAL ENCOUNTER (OUTPATIENT)
Dept: LAB | Facility: HOSPITAL | Age: 52
Discharge: HOME | End: 2017-05-23
Attending: NURSE PRACTITIONER
Payer: COMMERCIAL

## 2017-05-23 ENCOUNTER — PRIOR ORIGINAL RECORDS (OUTPATIENT)
Dept: OTHER | Age: 52
End: 2017-05-23

## 2017-05-23 DIAGNOSIS — Z94.1 HEART TRANSPLANTED (HCC): Primary | ICD-10-CM

## 2017-05-23 PROCEDURE — 80197 ASSAY OF TACROLIMUS: CPT

## 2017-05-23 PROCEDURE — 80048 BASIC METABOLIC PNL TOTAL CA: CPT

## 2017-05-23 PROCEDURE — 36415 COLL VENOUS BLD VENIPUNCTURE: CPT

## 2017-05-23 PROCEDURE — 93798 PHYS/QHP OP CAR RHAB W/ECG: CPT

## 2017-05-25 ENCOUNTER — PRIOR ORIGINAL RECORDS (OUTPATIENT)
Dept: OTHER | Age: 52
End: 2017-05-25

## 2017-05-31 ENCOUNTER — CARDPULM VISIT (OUTPATIENT)
Dept: CARDIAC REHAB | Facility: HOSPITAL | Age: 52
End: 2017-05-31
Attending: INTERNAL MEDICINE
Payer: COMMERCIAL

## 2017-05-31 PROCEDURE — 93798 PHYS/QHP OP CAR RHAB W/ECG: CPT

## 2017-06-02 ENCOUNTER — CARDPULM VISIT (OUTPATIENT)
Dept: CARDIAC REHAB | Facility: HOSPITAL | Age: 52
End: 2017-06-02
Attending: INTERNAL MEDICINE
Payer: COMMERCIAL

## 2017-06-02 PROCEDURE — 93798 PHYS/QHP OP CAR RHAB W/ECG: CPT

## 2017-06-05 ENCOUNTER — CARDPULM VISIT (OUTPATIENT)
Dept: CARDIAC REHAB | Facility: HOSPITAL | Age: 52
End: 2017-06-05
Attending: INTERNAL MEDICINE
Payer: COMMERCIAL

## 2017-06-05 PROCEDURE — 93798 PHYS/QHP OP CAR RHAB W/ECG: CPT

## 2017-06-07 ENCOUNTER — CARDPULM VISIT (OUTPATIENT)
Dept: CARDIAC REHAB | Facility: HOSPITAL | Age: 52
End: 2017-06-07
Attending: INTERNAL MEDICINE
Payer: COMMERCIAL

## 2017-06-07 PROCEDURE — 93798 PHYS/QHP OP CAR RHAB W/ECG: CPT

## 2017-06-09 ENCOUNTER — CARDPULM VISIT (OUTPATIENT)
Dept: CARDIAC REHAB | Facility: HOSPITAL | Age: 52
End: 2017-06-09
Attending: INTERNAL MEDICINE
Payer: COMMERCIAL

## 2017-06-09 ENCOUNTER — HOSPITAL ENCOUNTER (OUTPATIENT)
Dept: LAB | Facility: HOSPITAL | Age: 52
Discharge: HOME | End: 2017-06-09
Attending: INTERNAL MEDICINE
Payer: COMMERCIAL

## 2017-06-09 ENCOUNTER — PRIOR ORIGINAL RECORDS (OUTPATIENT)
Dept: OTHER | Age: 52
End: 2017-06-09

## 2017-06-09 PROCEDURE — 80197 ASSAY OF TACROLIMUS: CPT | Performed by: INTERNAL MEDICINE

## 2017-06-09 PROCEDURE — 80180 DRUG SCRN QUAN MYCOPHENOLATE: CPT | Performed by: INTERNAL MEDICINE

## 2017-06-09 PROCEDURE — 93798 PHYS/QHP OP CAR RHAB W/ECG: CPT

## 2017-06-09 PROCEDURE — 36415 COLL VENOUS BLD VENIPUNCTURE: CPT | Performed by: INTERNAL MEDICINE

## 2017-06-12 ENCOUNTER — CARDPULM VISIT (OUTPATIENT)
Dept: CARDIAC REHAB | Facility: HOSPITAL | Age: 52
End: 2017-06-12
Attending: INTERNAL MEDICINE
Payer: COMMERCIAL

## 2017-06-12 PROCEDURE — 93798 PHYS/QHP OP CAR RHAB W/ECG: CPT

## 2017-06-15 ENCOUNTER — MYAURORA ACCOUNT LINK (OUTPATIENT)
Dept: OTHER | Age: 52
End: 2017-06-15

## 2017-06-16 ENCOUNTER — CARDPULM VISIT (OUTPATIENT)
Dept: CARDIAC REHAB | Facility: HOSPITAL | Age: 52
End: 2017-06-16
Attending: INTERNAL MEDICINE
Payer: COMMERCIAL

## 2017-06-16 ENCOUNTER — LAB ENCOUNTER (OUTPATIENT)
Dept: LAB | Facility: HOSPITAL | Age: 52
End: 2017-06-16
Attending: NURSE PRACTITIONER
Payer: COMMERCIAL

## 2017-06-16 DIAGNOSIS — Z94.1 HEART REPLACED BY TRANSPLANT (HCC): Primary | ICD-10-CM

## 2017-06-16 PROCEDURE — 87340 HEPATITIS B SURFACE AG IA: CPT

## 2017-06-16 PROCEDURE — 87389 HIV-1 AG W/HIV-1&-2 AB AG IA: CPT

## 2017-06-16 PROCEDURE — 87522 HEPATITIS C REVRS TRNSCRPJ: CPT

## 2017-06-16 PROCEDURE — 93798 PHYS/QHP OP CAR RHAB W/ECG: CPT

## 2017-06-16 PROCEDURE — 86803 HEPATITIS C AB TEST: CPT

## 2017-06-16 PROCEDURE — 86706 HEP B SURFACE ANTIBODY: CPT

## 2017-06-16 PROCEDURE — 87536 HIV-1 QUANT&REVRSE TRNSCRPJ: CPT

## 2017-06-16 PROCEDURE — 86704 HEP B CORE ANTIBODY TOTAL: CPT

## 2017-06-16 PROCEDURE — 87517 HEPATITIS B DNA QUANT: CPT

## 2017-06-16 PROCEDURE — 36415 COLL VENOUS BLD VENIPUNCTURE: CPT

## 2017-06-20 LAB
BUN: 43 MG/DL
CALCIUM: 8.7 MG/DL
CHLORIDE: 107 MEQ/L
CREATININE, SERUM: 1.65 MG/DL
GLUCOSE: 99 MG/DL
MPA GLUCURONIDE: 89.7
MYCOPHENOLIC ACID LEVEL: 2.3
POTASSIUM, SERUM: 4 MEQ/L
SODIUM: 144 MEQ/L
TACROLIMUS TROUGH LEVEL: 11.1
TACROLIMUS TROUGH LEVEL: 9.5

## 2017-06-28 ENCOUNTER — APPOINTMENT (OUTPATIENT)
Dept: CARDIAC REHAB | Facility: HOSPITAL | Age: 52
End: 2017-06-28
Attending: INTERNAL MEDICINE
Payer: COMMERCIAL

## 2017-06-30 ENCOUNTER — CARDPULM VISIT (OUTPATIENT)
Dept: CARDIAC REHAB | Facility: HOSPITAL | Age: 52
End: 2017-06-30
Attending: INTERNAL MEDICINE
Payer: COMMERCIAL

## 2017-06-30 ENCOUNTER — PRIOR ORIGINAL RECORDS (OUTPATIENT)
Dept: OTHER | Age: 52
End: 2017-06-30

## 2017-06-30 PROCEDURE — 93798 PHYS/QHP OP CAR RHAB W/ECG: CPT

## 2017-07-03 ENCOUNTER — CARDPULM VISIT (OUTPATIENT)
Dept: CARDIAC REHAB | Facility: HOSPITAL | Age: 52
End: 2017-07-03
Attending: INTERNAL MEDICINE
Payer: COMMERCIAL

## 2017-07-03 PROCEDURE — 93798 PHYS/QHP OP CAR RHAB W/ECG: CPT

## 2017-07-07 ENCOUNTER — CARDPULM VISIT (OUTPATIENT)
Dept: CARDIAC REHAB | Facility: HOSPITAL | Age: 52
End: 2017-07-07
Attending: INTERNAL MEDICINE
Payer: COMMERCIAL

## 2017-07-07 PROCEDURE — 93798 PHYS/QHP OP CAR RHAB W/ECG: CPT

## 2017-07-13 ENCOUNTER — PRIOR ORIGINAL RECORDS (OUTPATIENT)
Dept: OTHER | Age: 52
End: 2017-07-13

## 2017-07-14 LAB
ALBUMIN: 3.6 G/DL
ALKALINE PHOSPHATATE(ALK PHOS): 99 IU/L
BILIRUBIN TOTAL: 0.4 MG/DL
BUN: 52 MG/DL
CALCIUM: 8.8 MG/DL
CHLORIDE: 104 MEQ/L
CHOLESTEROL, TOTAL: 153 MG/DL
CREATININE, SERUM: 1.8 MG/DL
GLUCOSE: 99 MG/DL
HDL CHOLESTEROL: 60 MG/DL
HEMATOCRIT: 30.5 %
HEMOGLOBIN A1C: 4.7 %
HEMOGLOBIN: 9.4 G/DL
LDL CHOLESTEROL: 75 MG/DL
MAGNESIUM: 1.9 MG/DL
PLATELETS: 191 K/UL
POTASSIUM, SERUM: 3.8 MEQ/L
PROTEIN, TOTAL: 6.4 G/DL
RED BLOOD COUNT: 3.28 X 10-6/U
SGOT (AST): 24 IU/L
SGPT (ALT): 16 IU/L
SODIUM: 146 MEQ/L
TACROLIMUS TROUGH LEVEL: 11.4
TRIGLYCERIDES: 91 MG/DL
WHITE BLOOD COUNT: 3.1 X 10-3/U

## 2017-07-21 ENCOUNTER — APPOINTMENT (OUTPATIENT)
Dept: CARDIAC REHAB | Facility: HOSPITAL | Age: 52
End: 2017-07-21
Attending: INTERNAL MEDICINE
Payer: COMMERCIAL

## 2017-07-24 ENCOUNTER — APPOINTMENT (OUTPATIENT)
Dept: CARDIAC REHAB | Facility: HOSPITAL | Age: 52
End: 2017-07-24
Attending: INTERNAL MEDICINE
Payer: COMMERCIAL

## 2017-07-24 ENCOUNTER — PRIOR ORIGINAL RECORDS (OUTPATIENT)
Dept: OTHER | Age: 52
End: 2017-07-24

## 2017-07-26 ENCOUNTER — APPOINTMENT (OUTPATIENT)
Dept: CARDIAC REHAB | Facility: HOSPITAL | Age: 52
End: 2017-07-26
Attending: INTERNAL MEDICINE
Payer: COMMERCIAL

## 2017-07-26 ENCOUNTER — PRIOR ORIGINAL RECORDS (OUTPATIENT)
Dept: OTHER | Age: 52
End: 2017-07-26

## 2017-07-28 ENCOUNTER — APPOINTMENT (OUTPATIENT)
Dept: CARDIAC REHAB | Facility: HOSPITAL | Age: 52
End: 2017-07-28
Attending: INTERNAL MEDICINE
Payer: COMMERCIAL

## 2017-07-31 ENCOUNTER — APPOINTMENT (OUTPATIENT)
Dept: CARDIAC REHAB | Facility: HOSPITAL | Age: 52
End: 2017-07-31
Attending: INTERNAL MEDICINE
Payer: COMMERCIAL

## 2017-08-02 ENCOUNTER — APPOINTMENT (OUTPATIENT)
Dept: CARDIAC REHAB | Facility: HOSPITAL | Age: 52
End: 2017-08-02
Attending: INTERNAL MEDICINE
Payer: COMMERCIAL

## 2017-08-04 ENCOUNTER — APPOINTMENT (OUTPATIENT)
Dept: CARDIAC REHAB | Facility: HOSPITAL | Age: 52
End: 2017-08-04
Attending: INTERNAL MEDICINE
Payer: COMMERCIAL

## 2017-08-07 ENCOUNTER — APPOINTMENT (OUTPATIENT)
Dept: CARDIAC REHAB | Facility: HOSPITAL | Age: 52
End: 2017-08-07
Attending: INTERNAL MEDICINE
Payer: COMMERCIAL

## 2017-08-09 ENCOUNTER — APPOINTMENT (OUTPATIENT)
Dept: CARDIAC REHAB | Facility: HOSPITAL | Age: 52
End: 2017-08-09
Attending: INTERNAL MEDICINE
Payer: COMMERCIAL

## 2017-08-11 ENCOUNTER — APPOINTMENT (OUTPATIENT)
Dept: CARDIAC REHAB | Facility: HOSPITAL | Age: 52
End: 2017-08-11
Attending: INTERNAL MEDICINE
Payer: COMMERCIAL

## 2017-08-11 ENCOUNTER — LAB ENCOUNTER (OUTPATIENT)
Dept: LAB | Facility: HOSPITAL | Age: 52
End: 2017-08-11
Attending: NURSE PRACTITIONER
Payer: COMMERCIAL

## 2017-08-11 DIAGNOSIS — Z94.1 HEART REPLACED BY TRANSPLANT (HCC): Primary | ICD-10-CM

## 2017-08-11 LAB
BUN BLD-MCNC: 30 MG/DL (ref 8–20)
CALCIUM BLD-MCNC: 8.7 MG/DL (ref 8.3–10.3)
CHLORIDE: 112 MMOL/L (ref 101–111)
CO2: 24 MMOL/L (ref 22–32)
CREAT BLD-MCNC: 2.42 MG/DL (ref 0.55–1.02)
GLUCOSE BLD-MCNC: 102 MG/DL (ref 70–99)
POTASSIUM SERPL-SCNC: 5.2 MMOL/L (ref 3.6–5.1)
SODIUM SERPL-SCNC: 144 MMOL/L (ref 136–144)

## 2017-08-11 PROCEDURE — 36415 COLL VENOUS BLD VENIPUNCTURE: CPT

## 2017-08-11 PROCEDURE — 80197 ASSAY OF TACROLIMUS: CPT

## 2017-08-11 PROCEDURE — 80048 BASIC METABOLIC PNL TOTAL CA: CPT

## 2017-08-12 LAB — TACROLIMUS: 9 NG/ML

## 2017-08-13 LAB
CMV QUANT BY PCR, CPY/ML: <390 CPY/ML
CMV QUANT BY PCR, INTERP: NOT DETECTED
CMV QUANT BY PCR, IU/ML: <227 IU/ML
CMV QUANT BY PCR, LOG CPY/ML: <2.6 LOG CPY/ML
CMV QUANT BY PCR, LOG IU/ML: <2.4 LOG IU/ML

## 2017-08-14 ENCOUNTER — APPOINTMENT (OUTPATIENT)
Dept: CARDIAC REHAB | Facility: HOSPITAL | Age: 52
End: 2017-08-14
Attending: INTERNAL MEDICINE
Payer: COMMERCIAL

## 2017-08-16 ENCOUNTER — APPOINTMENT (OUTPATIENT)
Dept: CARDIAC REHAB | Facility: HOSPITAL | Age: 52
End: 2017-08-16
Attending: INTERNAL MEDICINE
Payer: COMMERCIAL

## 2017-08-17 ENCOUNTER — LAB ENCOUNTER (OUTPATIENT)
Dept: LAB | Age: 52
End: 2017-08-17
Attending: NURSE PRACTITIONER
Payer: COMMERCIAL

## 2017-08-17 DIAGNOSIS — Z94.1 HEART REPLACED BY TRANSPLANT (HCC): Primary | ICD-10-CM

## 2017-08-17 LAB
BUN BLD-MCNC: 27 MG/DL (ref 8–20)
CALCIUM BLD-MCNC: 8.7 MG/DL (ref 8.3–10.3)
CHLORIDE: 111 MMOL/L (ref 101–111)
CO2: 25 MMOL/L (ref 22–32)
CREAT BLD-MCNC: 1.83 MG/DL (ref 0.55–1.02)
GLUCOSE BLD-MCNC: 93 MG/DL (ref 70–99)
POTASSIUM SERPL-SCNC: 4.7 MMOL/L (ref 3.6–5.1)
SODIUM SERPL-SCNC: 143 MMOL/L (ref 136–144)

## 2017-08-17 PROCEDURE — 87799 DETECT AGENT NOS DNA QUANT: CPT

## 2017-08-17 PROCEDURE — 80048 BASIC METABOLIC PNL TOTAL CA: CPT

## 2017-08-18 ENCOUNTER — APPOINTMENT (OUTPATIENT)
Dept: CARDIAC REHAB | Facility: HOSPITAL | Age: 52
End: 2017-08-18
Attending: INTERNAL MEDICINE
Payer: COMMERCIAL

## 2017-08-21 ENCOUNTER — APPOINTMENT (OUTPATIENT)
Dept: CARDIAC REHAB | Facility: HOSPITAL | Age: 52
End: 2017-08-21
Attending: INTERNAL MEDICINE
Payer: COMMERCIAL

## 2017-08-21 LAB
BK VIRUS DNA, QUANT COPY/ML: <390 CPY/ML
BK VIRUS DNA, QUANT INTERP: NOT DETECTED
BK VIRUS DNA, QUANTITATION: <2.6 LOG
BK VIRUS, URINE INTERP: DETECTED
BK VIRUS, URINE, LOG COPY/ML: 6.4 LOG CPY/ML

## 2017-08-23 ENCOUNTER — APPOINTMENT (OUTPATIENT)
Dept: CARDIAC REHAB | Facility: HOSPITAL | Age: 52
End: 2017-08-23
Attending: INTERNAL MEDICINE
Payer: COMMERCIAL

## 2017-08-25 ENCOUNTER — APPOINTMENT (OUTPATIENT)
Dept: CARDIAC REHAB | Facility: HOSPITAL | Age: 52
End: 2017-08-25
Attending: INTERNAL MEDICINE
Payer: COMMERCIAL

## 2017-08-28 ENCOUNTER — MYAURORA ACCOUNT LINK (OUTPATIENT)
Dept: OTHER | Age: 52
End: 2017-08-28

## 2017-09-02 ENCOUNTER — LAB ENCOUNTER (OUTPATIENT)
Dept: LAB | Facility: HOSPITAL | Age: 52
End: 2017-09-02
Attending: NURSE PRACTITIONER
Payer: COMMERCIAL

## 2017-09-02 ENCOUNTER — PRIOR ORIGINAL RECORDS (OUTPATIENT)
Dept: OTHER | Age: 52
End: 2017-09-02

## 2017-09-02 DIAGNOSIS — Z94.1 TRANSPLANTED HEART (HCC): Primary | ICD-10-CM

## 2017-09-02 LAB
ALBUMIN SERPL BCP-MCNC: 3.3 G/DL (ref 3.5–4.8)
ALBUMIN/GLOB SERPL: 0.9 {RATIO} (ref 1–2)
ALP SERPL-CCNC: 231 U/L (ref 32–100)
ALT SERPL-CCNC: 21 U/L (ref 14–54)
ANION GAP SERPL CALC-SCNC: 8 MMOL/L (ref 0–18)
AST SERPL-CCNC: 36 U/L (ref 15–41)
BASOPHILS # BLD: 0.1 K/UL (ref 0–0.2)
BASOPHILS NFR BLD: 1 %
BILIRUB SERPL-MCNC: 0.8 MG/DL (ref 0.3–1.2)
BUN SERPL-MCNC: 31 MG/DL (ref 8–20)
BUN/CREAT SERPL: 16.6 (ref 10–20)
CALCIUM SERPL-MCNC: 8.8 MG/DL (ref 8.5–10.5)
CHLORIDE SERPL-SCNC: 108 MMOL/L (ref 95–110)
CO2 SERPL-SCNC: 23 MMOL/L (ref 22–32)
CREAT SERPL-MCNC: 1.87 MG/DL (ref 0.5–1.5)
EOSINOPHIL # BLD: 0.4 K/UL (ref 0–0.7)
EOSINOPHIL NFR BLD: 4 %
ERYTHROCYTE [DISTWIDTH] IN BLOOD BY AUTOMATED COUNT: 15.6 % (ref 11–15)
GLOBULIN PLAS-MCNC: 3.5 G/DL (ref 2.5–3.7)
GLUCOSE SERPL-MCNC: 95 MG/DL (ref 70–99)
HCT VFR BLD AUTO: 30.7 % (ref 35–48)
HGB BLD-MCNC: 9.9 G/DL (ref 12–16)
LYMPHOCYTES # BLD: 1.1 K/UL (ref 1–4)
LYMPHOCYTES NFR BLD: 11 %
MAGNESIUM SERPL-MCNC: 2 MG/DL (ref 1.8–2.5)
MCH RBC QN AUTO: 28.7 PG (ref 27–32)
MCHC RBC AUTO-ENTMCNC: 32.2 G/DL (ref 32–37)
MCV RBC AUTO: 89.1 FL (ref 80–100)
MONOCYTES # BLD: 0.8 K/UL (ref 0–1)
MONOCYTES NFR BLD: 8 %
NEUTROPHILS # BLD AUTO: 7.6 K/UL (ref 1.8–7.7)
NEUTROPHILS NFR BLD: 77 %
OSMOLALITY UR CALC.SUM OF ELEC: 294 MOSM/KG (ref 275–295)
PLATELET # BLD AUTO: 174 K/UL (ref 140–400)
PMV BLD AUTO: 9.5 FL (ref 7.4–10.3)
POTASSIUM SERPL-SCNC: 4.7 MMOL/L (ref 3.3–5.1)
PROT SERPL-MCNC: 6.8 G/DL (ref 5.9–8.4)
RBC # BLD AUTO: 3.44 M/UL (ref 3.7–5.4)
SODIUM SERPL-SCNC: 139 MMOL/L (ref 136–144)
WBC # BLD AUTO: 9.9 K/UL (ref 4–11)

## 2017-09-02 PROCEDURE — 36415 COLL VENOUS BLD VENIPUNCTURE: CPT

## 2017-09-02 PROCEDURE — 80197 ASSAY OF TACROLIMUS: CPT

## 2017-09-02 PROCEDURE — 83735 ASSAY OF MAGNESIUM: CPT

## 2017-09-02 PROCEDURE — 85025 COMPLETE CBC W/AUTO DIFF WBC: CPT

## 2017-09-02 PROCEDURE — 93005 ELECTROCARDIOGRAM TRACING: CPT

## 2017-09-02 PROCEDURE — 80053 COMPREHEN METABOLIC PANEL: CPT

## 2017-09-02 PROCEDURE — 93010 ELECTROCARDIOGRAM REPORT: CPT | Performed by: NURSE PRACTITIONER

## 2017-09-04 LAB
CMV QUANT BY PCR, CPY/ML: <390 CPY/ML
CMV QUANT BY PCR, INTERP: NOT DETECTED
CMV QUANT BY PCR, IU/ML: <227 IU/ML
CMV QUANT BY PCR, LOG CPY/ML: <2.6 LOG CPY/ML
CMV QUANT BY PCR, LOG IU/ML: <2.4 LOG IU/ML
TACROLIMUS: 7 NG/ML

## 2017-09-06 ENCOUNTER — PRIOR ORIGINAL RECORDS (OUTPATIENT)
Dept: OTHER | Age: 52
End: 2017-09-06

## 2017-09-07 ENCOUNTER — PRIOR ORIGINAL RECORDS (OUTPATIENT)
Dept: OTHER | Age: 52
End: 2017-09-07

## 2017-09-07 PROBLEM — N28.9 RENAL INSUFFICIENCY: Status: ACTIVE | Noted: 2017-09-07

## 2017-09-07 PROBLEM — E60 LOW ZINC LEVEL: Status: ACTIVE | Noted: 2017-09-07

## 2017-09-07 PROCEDURE — 84630 ASSAY OF ZINC: CPT | Performed by: FAMILY MEDICINE

## 2017-09-09 ENCOUNTER — HOSPITAL ENCOUNTER (OUTPATIENT)
Dept: CV DIAGNOSTICS | Facility: HOSPITAL | Age: 52
Discharge: HOME | End: 2017-09-09
Attending: INTERNAL MEDICINE
Payer: COMMERCIAL

## 2017-09-09 DIAGNOSIS — R00.2 PALPITATIONS: ICD-10-CM

## 2017-09-09 DIAGNOSIS — Z94.1: ICD-10-CM

## 2017-09-09 PROCEDURE — 93271 ECG/MONITORING AND ANALYSIS: CPT | Performed by: INTERNAL MEDICINE

## 2017-09-09 PROCEDURE — 93270 REMOTE 30 DAY ECG REV/REPORT: CPT | Performed by: INTERNAL MEDICINE

## 2017-09-09 PROCEDURE — 93272 ECG/REVIEW INTERPRET ONLY: CPT | Performed by: INTERNAL MEDICINE

## 2017-09-13 LAB
ALBUMIN: 3.3 G/DL
ALKALINE PHOSPHATATE(ALK PHOS): 231 IU/L
BILIRUBIN TOTAL: 0.8 MG/DL
BUN: 31 MG/DL
CHLORIDE: 108 MEQ/L
CREATININE, SERUM: 1.87 MG/DL
GLUCOSE: 95 MG/DL
HEMATOCRIT: 30.7 %
HEMOGLOBIN: 9.9 G/DL
MAGNESIUM: 2 MG/DL
PLATELETS: 174 K/UL
POTASSIUM, SERUM: 4.7 MEQ/L
PROTEIN, TOTAL: 6.8 G/DL
RED BLOOD COUNT: 3.44 X 10-6/U
SGOT (AST): 36 IU/L
SGPT (ALT): 21 IU/L
SODIUM: 139 MEQ/L
TACROLIMUS TROUGH LEVEL: 7
WHITE BLOOD COUNT: 9.9 X 10-3/U

## 2017-09-15 LAB
ALBUMIN: 4.2 G/DL
ALKALINE PHOSPHATATE(ALK PHOS): 146 IU/L
BILIRUBIN TOTAL: 0.8 MG/DL
BUN: 41 MG/DL
CALCIUM: 9 MG/DL
CHLORIDE: 107 MEQ/L
CREATININE, SERUM: 1.3 MG/DL
GLUCOSE: 91 MG/DL
HEMATOCRIT: 30.9 %
HEMOGLOBIN: 9.9 G/DL
MAGNESIUM: 2 MG/DL
PLATELETS: 169 K/UL
POTASSIUM, SERUM: 4.6 MEQ/L
PROTEIN, TOTAL: 7 G/DL
RED BLOOD COUNT: 3.4 X 10-6/U
SGOT (AST): 45 IU/L
SGPT (ALT): 29 IU/L
SODIUM: 142 MEQ/L
WHITE BLOOD COUNT: 3.1 X 10-3/U

## 2017-10-06 NOTE — HISTORICAL OFFICE NOTE
Lj Victoriaaaron  : 1965  ACCOUNT:  [id number]  [phone]  PCP: Dr. Kael Seaman: 2017  DICTATED BY:  Lili Verdugo MD]    CHIEF COMPLAINT: [Followup of Heart transplant.]    HPI: Landry Ribeiro returns today after having a brie SMOKING: Former tobacco use. used to smoke but quit and 1990. CAFFEINE: no caffeine. ALCOHOL: drinks rarely. EXERCISE: minimal. DIET: low sodium diet. OCCUPATION: .      ALLERGIES: Heparin - CLASS    MEDICATIONS: Selected prescriptions see bel 60MG      one capsule daily                        05/04/17 Mycophenolate Mofetil 500MG     one tablet three times daily             05/04/17 Tacrolimus            1MG       3 tablets twice daily w/0.5mg capsul     01/19/17 Aspirin

## 2017-10-10 ENCOUNTER — HOSPITAL ENCOUNTER (OUTPATIENT)
Dept: INTERVENTIONAL RADIOLOGY/VASCULAR | Facility: HOSPITAL | Age: 52
Discharge: HOME | End: 2017-10-10
Attending: INTERNAL MEDICINE | Admitting: INTERNAL MEDICINE
Payer: COMMERCIAL

## 2017-10-10 ENCOUNTER — PRIOR ORIGINAL RECORDS (OUTPATIENT)
Dept: OTHER | Age: 52
End: 2017-10-10

## 2017-10-10 VITALS
DIASTOLIC BLOOD PRESSURE: 56 MMHG | WEIGHT: 181 LBS | SYSTOLIC BLOOD PRESSURE: 125 MMHG | TEMPERATURE: 99 F | HEART RATE: 67 BPM | RESPIRATION RATE: 18 BRPM | HEIGHT: 61 IN | BODY MASS INDEX: 34.17 KG/M2 | OXYGEN SATURATION: 99 %

## 2017-10-10 DIAGNOSIS — Z94.1 HEART TRANSPLANT RECIPIENT (HCC): ICD-10-CM

## 2017-10-10 PROCEDURE — 93505 ENDOMYOCARDIAL BIOPSY: CPT

## 2017-10-10 PROCEDURE — 80197 ASSAY OF TACROLIMUS: CPT | Performed by: INTERNAL MEDICINE

## 2017-10-10 PROCEDURE — 88307 TISSUE EXAM BY PATHOLOGIST: CPT | Performed by: INTERNAL MEDICINE

## 2017-10-10 PROCEDURE — 82803 BLOOD GASES ANY COMBINATION: CPT | Performed by: INTERNAL MEDICINE

## 2017-10-10 PROCEDURE — 82375 ASSAY CARBOXYHB QUANT: CPT | Performed by: INTERNAL MEDICINE

## 2017-10-10 PROCEDURE — 02BK3ZX EXCISION OF RIGHT VENTRICLE, PERCUTANEOUS APPROACH, DIAGNOSTIC: ICD-10-PCS | Performed by: INTERNAL MEDICINE

## 2017-10-10 PROCEDURE — 85018 HEMOGLOBIN: CPT | Performed by: INTERNAL MEDICINE

## 2017-10-10 PROCEDURE — 88313 SPECIAL STAINS GROUP 2: CPT | Performed by: INTERNAL MEDICINE

## 2017-10-10 PROCEDURE — 4A023N6 MEASUREMENT OF CARDIAC SAMPLING AND PRESSURE, RIGHT HEART, PERCUTANEOUS APPROACH: ICD-10-PCS | Performed by: INTERNAL MEDICINE

## 2017-10-10 PROCEDURE — 88348 ELECTRON MICROSCOPY DX: CPT | Performed by: INTERNAL MEDICINE

## 2017-10-10 PROCEDURE — 93451 RIGHT HEART CATH: CPT

## 2017-10-10 PROCEDURE — 36600 WITHDRAWAL OF ARTERIAL BLOOD: CPT | Performed by: INTERNAL MEDICINE

## 2017-10-10 PROCEDURE — 83050 HGB METHEMOGLOBIN QUAN: CPT | Performed by: INTERNAL MEDICINE

## 2017-10-10 RX ORDER — HEPARIN SODIUM 5000 [USP'U]/ML
INJECTION, SOLUTION INTRAVENOUS; SUBCUTANEOUS
Status: DISCONTINUED
Start: 2017-10-10 | End: 2017-10-10 | Stop reason: WASHOUT

## 2017-10-10 RX ORDER — SODIUM CHLORIDE 9 MG/ML
INJECTION, SOLUTION INTRAVENOUS CONTINUOUS
Status: DISCONTINUED | OUTPATIENT
Start: 2017-10-10 | End: 2017-10-10

## 2017-10-10 RX ORDER — LIDOCAINE HYDROCHLORIDE 10 MG/ML
INJECTION, SOLUTION INFILTRATION; PERINEURAL
Status: COMPLETED
Start: 2017-10-10 | End: 2017-10-10

## 2017-10-10 RX ORDER — ONDANSETRON 2 MG/ML
INJECTION INTRAMUSCULAR; INTRAVENOUS
Status: COMPLETED
Start: 2017-10-10 | End: 2017-10-10

## 2017-10-10 RX ADMIN — ONDANSETRON 4 MG: 2 INJECTION INTRAMUSCULAR; INTRAVENOUS at 11:22:00

## 2017-10-10 RX ADMIN — SODIUM CHLORIDE: 9 INJECTION, SOLUTION INTRAVENOUS at 09:15:00

## 2017-10-10 NOTE — OPERATIVE REPORT
RHC with biopsy    Procedures:    Right Heart Catheterization  Right Ventricular Endomyocardial Biopsy    Indication:  Rejection Surveillance    Access Site: Right Internal Jugular Vein    Sheath Size (Fr): 7    Complications:  None    Findings:  BP:116/68

## 2017-10-10 NOTE — H&P
History & Physical Examination    Patient Name: Dashawn Garcia  MRN: [id number]  [facility]: [id number]  YOB: 1965    Diagnosis: Heart Transplant    Present Illness:   She is a 47 y/o female with history of heart transplant in 12/2016, that was recen OR) Take 1,500 mg by mouth daily. Disp:  Rfl:  10/10/2017 at Unknown time   multivitamin Oral Tab Take 1 tablet by mouth daily. Disp:  Rfl:  10/10/2017 at Unknown time   Mycophenolate Mofetil 500 MG Oral Tab Take 1,000 mg by mouth 2 (two) times daily.  Disp [facility])    • Rheumatic fever    • Right-sided heart failure (HCC)    • Tricuspid regurgitation    • Valvular disease     Tricuspid valve repair May 2016     Past Surgical History:  No date: ABD PARACENTESIS      Comment: multiple  No date: ABLATION      Comm Negrito Akins M.D., F.FABIOLA., FSHAWN., F.E.S.C.   Medical Director, [facility] Heart Failure and Pulmonary Arterial Hypertension Programs  Medical Director, [address]

## 2017-10-10 NOTE — PROGRESS NOTES
Rc'd pt from cath lab. Dressing to right neck is soft, clean and dry. No bleeding or hematoma. Pt denies c/o pain. Pt with emesis basin, c/o nausea. Pt with history of vertigo after lying flat for prolonged periods of time. Kept NPO. IVF infusing freely.  Z

## 2017-10-18 ENCOUNTER — PRIOR ORIGINAL RECORDS (OUTPATIENT)
Dept: OTHER | Age: 52
End: 2017-10-18

## 2017-10-18 LAB
CHOLESTEROL, TOTAL: 155 MG/DL
HDL CHOLESTEROL: 70 MG/DL
LDL CHOLESTEROL: 75 MG/DL
THYROID STIMULATING HORMONE: 1.8 MLU/L
TRIGLYCERIDES: 52 MG/DL

## 2017-11-15 ENCOUNTER — PRIOR ORIGINAL RECORDS (OUTPATIENT)
Dept: OTHER | Age: 52
End: 2017-11-15

## 2017-11-17 ENCOUNTER — PRIOR ORIGINAL RECORDS (OUTPATIENT)
Dept: OTHER | Age: 52
End: 2017-11-17

## 2017-11-18 ENCOUNTER — LAB ENCOUNTER (OUTPATIENT)
Dept: LAB | Facility: HOSPITAL | Age: 52
End: 2017-11-18
Attending: INTERNAL MEDICINE
Payer: COMMERCIAL

## 2017-11-18 DIAGNOSIS — J11.1 FLU: Primary | ICD-10-CM

## 2017-11-18 PROCEDURE — 87798 DETECT AGENT NOS DNA AMP: CPT

## 2017-11-18 PROCEDURE — 87502 INFLUENZA DNA AMP PROBE: CPT

## 2017-11-18 PROCEDURE — 87999 UNLISTED MICROBIOLOGY PX: CPT

## 2017-11-20 ENCOUNTER — PRIOR ORIGINAL RECORDS (OUTPATIENT)
Dept: OTHER | Age: 52
End: 2017-11-20

## 2017-11-24 ENCOUNTER — PRIOR ORIGINAL RECORDS (OUTPATIENT)
Dept: OTHER | Age: 52
End: 2017-11-24

## 2017-11-28 ENCOUNTER — PRIOR ORIGINAL RECORDS (OUTPATIENT)
Dept: OTHER | Age: 52
End: 2017-11-28

## 2017-12-01 ENCOUNTER — APPOINTMENT (OUTPATIENT)
Dept: GENERAL RADIOLOGY | Age: 52
End: 2017-12-01
Attending: INTERNAL MEDICINE
Payer: COMMERCIAL

## 2017-12-01 ENCOUNTER — HOSPITAL ENCOUNTER (OUTPATIENT)
Dept: BONE DENSITY | Age: 52
Discharge: HOME | End: 2017-12-01
Attending: INTERNAL MEDICINE
Payer: COMMERCIAL

## 2017-12-01 DIAGNOSIS — Z94.1 H/O HEART TRANSPLANT (HCC): ICD-10-CM

## 2017-12-01 PROCEDURE — 77080 DXA BONE DENSITY AXIAL: CPT | Performed by: INTERNAL MEDICINE

## 2017-12-07 ENCOUNTER — APPOINTMENT (OUTPATIENT)
Dept: GENERAL RADIOLOGY | Facility: HOSPITAL | Age: 52
End: 2017-12-07
Attending: INTERNAL MEDICINE
Payer: COMMERCIAL

## 2017-12-07 ENCOUNTER — PRIOR ORIGINAL RECORDS (OUTPATIENT)
Dept: OTHER | Age: 52
End: 2017-12-07

## 2017-12-07 ENCOUNTER — HOSPITAL ENCOUNTER (OUTPATIENT)
Dept: INTERVENTIONAL RADIOLOGY/VASCULAR | Facility: HOSPITAL | Age: 52
Discharge: HOME | End: 2017-12-07
Attending: INTERNAL MEDICINE | Admitting: INTERNAL MEDICINE
Payer: COMMERCIAL

## 2017-12-07 VITALS
OXYGEN SATURATION: 100 % | BODY MASS INDEX: 35.41 KG/M2 | TEMPERATURE: 97 F | RESPIRATION RATE: 17 BRPM | HEIGHT: 61.5 IN | SYSTOLIC BLOOD PRESSURE: 103 MMHG | WEIGHT: 190 LBS | HEART RATE: 78 BPM | DIASTOLIC BLOOD PRESSURE: 58 MMHG

## 2017-12-07 DIAGNOSIS — Z94.1 HEART TRANSPLANT RECIPIENT (HCC): ICD-10-CM

## 2017-12-07 PROCEDURE — 92978 ENDOLUMINL IVUS OCT C 1ST: CPT

## 2017-12-07 PROCEDURE — 99153 MOD SED SAME PHYS/QHP EA: CPT

## 2017-12-07 PROCEDURE — 92979 ENDOLUMINL IVUS OCT C EA: CPT

## 2017-12-07 PROCEDURE — 76942 ECHO GUIDE FOR BIOPSY: CPT

## 2017-12-07 PROCEDURE — 93505 ENDOMYOCARDIAL BIOPSY: CPT

## 2017-12-07 PROCEDURE — 02BK3ZX EXCISION OF RIGHT VENTRICLE, PERCUTANEOUS APPROACH, DIAGNOSTIC: ICD-10-PCS | Performed by: INTERNAL MEDICINE

## 2017-12-07 PROCEDURE — B211YZZ FLUOROSCOPY OF MULTIPLE CORONARY ARTERIES USING OTHER CONTRAST: ICD-10-PCS | Performed by: INTERNAL MEDICINE

## 2017-12-07 PROCEDURE — 85027 COMPLETE CBC AUTOMATED: CPT | Performed by: INTERNAL MEDICINE

## 2017-12-07 PROCEDURE — 88313 SPECIAL STAINS GROUP 2: CPT | Performed by: INTERNAL MEDICINE

## 2017-12-07 PROCEDURE — 99152 MOD SED SAME PHYS/QHP 5/>YRS: CPT

## 2017-12-07 PROCEDURE — 85347 COAGULATION TIME ACTIVATED: CPT

## 2017-12-07 PROCEDURE — 4A023N6 MEASUREMENT OF CARDIAC SAMPLING AND PRESSURE, RIGHT HEART, PERCUTANEOUS APPROACH: ICD-10-PCS | Performed by: INTERNAL MEDICINE

## 2017-12-07 PROCEDURE — 80048 BASIC METABOLIC PNL TOTAL CA: CPT | Performed by: INTERNAL MEDICINE

## 2017-12-07 PROCEDURE — 71010 XR CHEST AP PORTABLE  (CPT=71010): CPT | Performed by: INTERNAL MEDICINE

## 2017-12-07 PROCEDURE — 88348 ELECTRON MICROSCOPY DX: CPT | Performed by: INTERNAL MEDICINE

## 2017-12-07 PROCEDURE — 88307 TISSUE EXAM BY PATHOLOGIST: CPT | Performed by: INTERNAL MEDICINE

## 2017-12-07 PROCEDURE — 93456 R HRT CORONARY ARTERY ANGIO: CPT

## 2017-12-07 PROCEDURE — B241ZZ3 ULTRASONOGRAPHY OF MULTIPLE CORONARY ARTERIES, INTRAVASCULAR: ICD-10-PCS | Performed by: INTERNAL MEDICINE

## 2017-12-07 RX ORDER — MIDAZOLAM HYDROCHLORIDE 1 MG/ML
INJECTION INTRAMUSCULAR; INTRAVENOUS
Status: COMPLETED
Start: 2017-12-07 | End: 2017-12-07

## 2017-12-07 RX ORDER — SODIUM CHLORIDE 9 MG/ML
INJECTION, SOLUTION INTRAVENOUS ONCE
Status: DISCONTINUED | OUTPATIENT
Start: 2017-12-07 | End: 2017-12-07

## 2017-12-07 RX ORDER — LIDOCAINE HYDROCHLORIDE 10 MG/ML
INJECTION, SOLUTION INFILTRATION; PERINEURAL
Status: COMPLETED
Start: 2017-12-07 | End: 2017-12-07

## 2017-12-07 RX ORDER — ASPIRIN 81 MG/1
324 TABLET, CHEWABLE ORAL DAILY
Status: DISCONTINUED | OUTPATIENT
Start: 2017-12-07 | End: 2017-12-07

## 2017-12-07 RX ORDER — SODIUM CHLORIDE 9 MG/ML
INJECTION, SOLUTION INTRAVENOUS CONTINUOUS
Status: DISCONTINUED | OUTPATIENT
Start: 2017-12-07 | End: 2017-12-07

## 2017-12-07 RX ORDER — 0.9 % SODIUM CHLORIDE 0.9 %
INTRAVENOUS SOLUTION INTRAVENOUS ONCE
Status: DISCONTINUED | OUTPATIENT
Start: 2017-12-07 | End: 2017-12-07

## 2017-12-07 RX ORDER — ASPIRIN 81 MG/1
TABLET, CHEWABLE ORAL
Status: COMPLETED
Start: 2017-12-07 | End: 2017-12-07

## 2017-12-07 RX ADMIN — SODIUM CHLORIDE: 9 INJECTION, SOLUTION INTRAVENOUS at 13:00:00

## 2017-12-07 RX ADMIN — SODIUM CHLORIDE: 9 INJECTION, SOLUTION INTRAVENOUS at 08:11:00

## 2017-12-07 RX ADMIN — ASPIRIN 324 MG: 81 TABLET, CHEWABLE ORAL at 07:56:00

## 2017-12-07 NOTE — PLAN OF CARE
Post right and left heart cath via right groin and right jugular vein with Dr. Isreal Villatoro. Site is soft with no hematoma noted. Dressing is clean, dry and intact. Patient had no difficulty voiding or ambulating the hallway.   Patient and sister given discha

## 2017-12-07 NOTE — H&P
AUDREY BADILLO  : 1965    History reviewed and up to date. No changes to H&P. The patient is scheduled for annual post heart transplant check including LHC, RHC, Heart biopsy and IVUS of LM and LAD artery. Patient was consented.  The risks and annette catheterization January 2016, cath 1/20/2014 no significant CAD, MAZE procedure TV repair 5/9/16, myocarditis, lymphocytic, permanent pacemaker 2008, restrictive cardiomyopathy, right cardiac catheterization w/endomyocardial biopsy Jan 2014, right cardiac 07/26/17 Prednisone            5MG       1 tablet daily                           05/04/17 Dexilant              60MG      one capsule daily                        05/04/17 Mycophenolate Mofetil 500MG     one tablet three times daily             05/04/17

## 2017-12-08 NOTE — PROCEDURES
[facility]    PATIENT'S NAME: Coco Walter   ATTENDING PHYSICIAN: Amirah Nogueira M.D. OPERATING PHYSICIAN: Alek Ordoñez M.D.    PATIENT ACCOUNT#:   [de-identified]    LOCATION:  [facility] 6 EDWP 10  MEDICAL RECORD #:   [id number]       DATE which was advanced to the pulmonary artery. We measured right heart pressures and calculated her cardiac output using a Rubens equation on room air.   We also obtained saturation from the right atrium and pulmonary artery and central aorta on room air to scr CATHETERIZATION HEMODYNAMICS:  Right atrial pressure 9 mmHg. Right ventricle pressure 43/3/10 mmHg. Pulmonary artery pressure 41/15/27 mmHg and wedge pressure 15-16 mmHg. Transpulmonary gradient was 11 mmHg.   Pulmonary vascular resistance was 1.1 Wood u intracoronary ultrasound, but there was very small calcified plaque near the ostium of the LAD artery, nonsignificant, seen by ultrasound and left main coronary artery had mild eccentric plaque only in one spot, otherwise there was no intimal hyperplasia.

## 2017-12-14 ENCOUNTER — PRIOR ORIGINAL RECORDS (OUTPATIENT)
Dept: OTHER | Age: 52
End: 2017-12-14

## 2017-12-16 ENCOUNTER — LAB ENCOUNTER (OUTPATIENT)
Dept: LAB | Facility: HOSPITAL | Age: 52
End: 2017-12-16
Attending: NURSE PRACTITIONER
Payer: COMMERCIAL

## 2017-12-16 DIAGNOSIS — Z94.1 HEART REPLACED BY TRANSPLANT (HCC): Primary | ICD-10-CM

## 2017-12-16 PROCEDURE — 80180 DRUG SCRN QUAN MYCOPHENOLATE: CPT

## 2017-12-16 PROCEDURE — 80197 ASSAY OF TACROLIMUS: CPT

## 2017-12-20 ENCOUNTER — HOSPITAL ENCOUNTER (OUTPATIENT)
Dept: CV DIAGNOSTICS | Facility: HOSPITAL | Age: 52
Discharge: HOME | End: 2017-12-20
Attending: INTERNAL MEDICINE
Payer: COMMERCIAL

## 2017-12-20 DIAGNOSIS — Z94.1 H/O HEART TRANSPLANT (HCC): ICD-10-CM

## 2017-12-20 LAB
ALBUMIN: 4 G/DL
ALKALINE PHOSPHATATE(ALK PHOS): 294 IU/L
BILIRUBIN TOTAL: 0.7 MG/DL
BUN: 26 MG/DL
CALCIUM: 9 MG/DL
CHLORIDE: 104 MEQ/L
CREATININE, SERUM: 1.4 MG/DL
GLUCOSE: 90 MG/DL
HEMATOCRIT: 31.5 %
HEMOGLOBIN: 9.7 G/DL
MAGNESIUM: 2.2 MG/DL
PLATELETS: 211 K/UL
POTASSIUM, SERUM: 4.6 MEQ/L
PROTEIN, TOTAL: 6.9 G/DL
RED BLOOD COUNT: 3.62 X 10-6/U
SGOT (AST): 31 IU/L
SGPT (ALT): 18 IU/L
SODIUM: 142 MEQ/L
TACROLIMUS TROUGH LEVEL: 7.1
WHITE BLOOD COUNT: 7.3 X 10-3/U

## 2017-12-20 PROCEDURE — 94621 CARDIOPULM EXERCISE TESTING: CPT | Performed by: INTERNAL MEDICINE

## 2018-01-08 LAB
BUN: 28 MG/DL
CALCIUM: 8.8 MG/DL
CHLORIDE: 108 MEQ/L
CREATININE, SERUM: 1.5 MG/DL
GLUCOSE: 87 MG/DL
HEMATOCRIT: 31.1 %
HEMOGLOBIN: 9.5 G/DL
PLATELETS: 196 K/UL
POTASSIUM, SERUM: 4.7 MEQ/L
RED BLOOD COUNT: 3.57 X 10-6/U
SODIUM: 141 MEQ/L
TACROLIMUS TROUGH LEVEL: 10.7
WHITE BLOOD COUNT: 6.8 X 10-3/U

## 2018-01-20 ENCOUNTER — LAB ENCOUNTER (OUTPATIENT)
Dept: LAB | Facility: HOSPITAL | Age: 53
End: 2018-01-20
Attending: NURSE PRACTITIONER
Payer: COMMERCIAL

## 2018-01-20 DIAGNOSIS — Z94.1 HEART REPLACED BY TRANSPLANT (HCC): Primary | ICD-10-CM

## 2018-01-20 LAB
BUN BLD-MCNC: 25 MG/DL (ref 8–20)
CALCIUM BLD-MCNC: 8.5 MG/DL (ref 8.3–10.3)
CHLORIDE: 110 MMOL/L (ref 101–111)
CO2: 26 MMOL/L (ref 22–32)
CREAT BLD-MCNC: 1.35 MG/DL (ref 0.55–1.02)
GLUCOSE BLD-MCNC: 88 MG/DL (ref 70–99)
POTASSIUM SERPL-SCNC: 4.6 MMOL/L (ref 3.6–5.1)
SODIUM SERPL-SCNC: 141 MMOL/L (ref 136–144)

## 2018-01-20 PROCEDURE — 80048 BASIC METABOLIC PNL TOTAL CA: CPT

## 2018-01-20 PROCEDURE — 36415 COLL VENOUS BLD VENIPUNCTURE: CPT

## 2018-01-20 PROCEDURE — 80197 ASSAY OF TACROLIMUS: CPT

## 2018-01-22 LAB — TACROLIMUS: 8.5 NG/ML

## 2018-03-14 PROBLEM — Z94.1 HEART TRANSPLANT RECIPIENT (HCC): Status: ACTIVE | Noted: 2018-03-14

## 2018-03-14 PROCEDURE — 87624 HPV HI-RISK TYP POOLED RSLT: CPT | Performed by: FAMILY MEDICINE

## 2018-03-14 PROCEDURE — 84630 ASSAY OF ZINC: CPT | Performed by: FAMILY MEDICINE

## 2018-03-14 PROCEDURE — 88175 CYTOPATH C/V AUTO FLUID REDO: CPT | Performed by: FAMILY MEDICINE

## 2018-05-29 PROCEDURE — 84080 ASSAY ALKALINE PHOSPHATASES: CPT | Performed by: FAMILY MEDICINE

## 2018-05-29 PROCEDURE — 84075 ASSAY ALKALINE PHOSPHATASE: CPT | Performed by: FAMILY MEDICINE

## 2018-06-12 ENCOUNTER — LAB ENCOUNTER (OUTPATIENT)
Dept: LAB | Facility: HOSPITAL | Age: 53
End: 2018-06-12
Attending: INTERNAL MEDICINE
Payer: COMMERCIAL

## 2018-06-12 ENCOUNTER — PRIOR ORIGINAL RECORDS (OUTPATIENT)
Dept: OTHER | Age: 53
End: 2018-06-12

## 2018-06-12 DIAGNOSIS — E10.9 DIABETES MELLITUS TYPE I (HCC): ICD-10-CM

## 2018-06-12 DIAGNOSIS — I50.40 COMBINED SYSTOLIC AND DIASTOLIC HEART FAILURE (HCC): Primary | ICD-10-CM

## 2018-06-12 DIAGNOSIS — Z94.1 HEART REPLACED BY TRANSPLANT (HCC): ICD-10-CM

## 2018-06-12 PROCEDURE — 36415 COLL VENOUS BLD VENIPUNCTURE: CPT

## 2018-06-12 PROCEDURE — 83036 HEMOGLOBIN GLYCOSYLATED A1C: CPT

## 2018-06-12 PROCEDURE — 80061 LIPID PANEL: CPT

## 2018-06-12 PROCEDURE — 80053 COMPREHEN METABOLIC PANEL: CPT

## 2018-06-12 PROCEDURE — 84443 ASSAY THYROID STIM HORMONE: CPT

## 2018-06-12 PROCEDURE — 80180 DRUG SCRN QUAN MYCOPHENOLATE: CPT

## 2018-06-12 PROCEDURE — 85025 COMPLETE CBC W/AUTO DIFF WBC: CPT

## 2018-06-12 PROCEDURE — 80197 ASSAY OF TACROLIMUS: CPT

## 2018-06-14 ENCOUNTER — PRIOR ORIGINAL RECORDS (OUTPATIENT)
Dept: OTHER | Age: 53
End: 2018-06-14

## 2018-07-03 LAB
ALBUMIN: 3 G/DL
ALKALINE PHOSPHATATE(ALK PHOS): 396 IU/L
BILIRUBIN TOTAL: 0.5 MG/DL
BUN: 33 MG/DL
CALCIUM: 9 MG/DL
CHLORIDE: 113 MEQ/L
CHOLESTEROL, TOTAL: 134 MG/DL
CREATININE, SERUM: 1.4 MG/DL
GLUCOSE: 93 MG/DL
HDL CHOLESTEROL: 62 MG/DL
HEMATOCRIT: 33 %
HEMOGLOBIN A1C: 5.3 %
HEMOGLOBIN: 10.3 G/DL
LDL CHOLESTEROL: 58 MG/DL
PLATELETS: 192 K/UL
POTASSIUM, SERUM: 5.4 MEQ/L
PROTEIN, TOTAL: 6.8 G/DL
RED BLOOD COUNT: 3.67 X 10-6/U
SGOT (AST): 91 IU/L
SGPT (ALT): 77 IU/L
SODIUM: 143 MEQ/L
THYROID STIMULATING HORMONE: 2.33 MLU/L
TRIGLYCERIDES: 71 MG/DL
WHITE BLOOD COUNT: 5.4 X 10-3/U

## 2018-07-05 ENCOUNTER — PRIOR ORIGINAL RECORDS (OUTPATIENT)
Dept: OTHER | Age: 53
End: 2018-07-05

## 2018-07-10 LAB
MPA GLUCURONIDE: 83.7
MYCOPHENOLIC ACID LEVEL: 1.1

## 2018-08-30 PROCEDURE — 86235 NUCLEAR ANTIGEN ANTIBODY: CPT | Performed by: INTERNAL MEDICINE

## 2018-08-30 PROCEDURE — 82164 ANGIOTENSIN I ENZYME TEST: CPT | Performed by: INTERNAL MEDICINE

## 2018-08-30 PROCEDURE — 84165 PROTEIN E-PHORESIS SERUM: CPT | Performed by: INTERNAL MEDICINE

## 2018-08-30 PROCEDURE — 86200 CCP ANTIBODY: CPT | Performed by: INTERNAL MEDICINE

## 2018-08-30 PROCEDURE — 86334 IMMUNOFIX E-PHORESIS SERUM: CPT | Performed by: INTERNAL MEDICINE

## 2018-08-30 PROCEDURE — 86038 ANTINUCLEAR ANTIBODIES: CPT | Performed by: INTERNAL MEDICINE

## 2018-08-30 PROCEDURE — 83883 ASSAY NEPHELOMETRY NOT SPEC: CPT | Performed by: INTERNAL MEDICINE

## 2018-08-30 PROCEDURE — 86160 COMPLEMENT ANTIGEN: CPT | Performed by: INTERNAL MEDICINE

## 2018-09-07 ENCOUNTER — HOSPITAL ENCOUNTER (OUTPATIENT)
Dept: CV DIAGNOSTICS | Facility: HOSPITAL | Age: 53
Discharge: HOME | End: 2018-09-07
Attending: INTERNAL MEDICINE

## 2018-09-07 ENCOUNTER — MYAURORA ACCOUNT LINK (OUTPATIENT)
Dept: OTHER | Age: 53
End: 2018-09-07

## 2018-09-07 DIAGNOSIS — Z94.1 H/O HEART TRANSPLANT (HCC): ICD-10-CM

## 2018-09-13 ENCOUNTER — LAB ENCOUNTER (OUTPATIENT)
Dept: LAB | Facility: HOSPITAL | Age: 53
End: 2018-09-13
Attending: INTERNAL MEDICINE
Payer: COMMERCIAL

## 2018-09-13 DIAGNOSIS — Z94.1 HEART REPLACED BY TRANSPLANT (HCC): Primary | ICD-10-CM

## 2018-09-13 DIAGNOSIS — I50.20 SYSTOLIC HEART FAILURE (HCC): ICD-10-CM

## 2018-09-13 LAB — BNP SERPL-MCNC: 167 PG/ML (ref 2–99)

## 2018-09-13 PROCEDURE — 36415 COLL VENOUS BLD VENIPUNCTURE: CPT

## 2018-09-13 PROCEDURE — 83880 ASSAY OF NATRIURETIC PEPTIDE: CPT

## 2018-09-19 NOTE — HISTORICAL OFFICE NOTE
Jose Greene  : 1965  ACCOUNT:  [id number]  [phone]  PCP: Dr. Corbett Bras: 2018  DICTATED BY:  [Dr. Archer Blow: [Followup of Heart transplant, Followup of Hypothyroidism and on replacement. dizziness. HEM/LYMPH: easy bruising. ALL: no new food or enviornmental allergies.       PAST HISTORY: hypothyroidism, cholecystectomy 1990 and lithotripsy 1995    PAST CV HISTORY: [address] ICD 2014, cardiac catheterization January 2016, cath 1 25MG      one tablet daily w/50mg dose             05/04/17 Dexilant              60MG      one capsule daily                        05/04/17 Mycophenolate Mofetil 500MG     one tablet three times daily             05/04/17 Tacrolimus            1MG

## 2018-09-24 ENCOUNTER — HOSPITAL ENCOUNTER (OUTPATIENT)
Dept: INTERVENTIONAL RADIOLOGY/VASCULAR | Facility: HOSPITAL | Age: 53
End: 2018-09-24
Attending: INTERNAL MEDICINE
Payer: COMMERCIAL

## 2018-09-24 ENCOUNTER — HOSPITAL ENCOUNTER (OUTPATIENT)
Dept: INTERVENTIONAL RADIOLOGY/VASCULAR | Facility: HOSPITAL | Age: 53
Discharge: HOME | End: 2018-09-24
Attending: INTERNAL MEDICINE
Payer: COMMERCIAL

## 2018-11-12 ENCOUNTER — PRIOR ORIGINAL RECORDS (OUTPATIENT)
Dept: OTHER | Age: 53
End: 2018-11-12

## 2018-11-15 ENCOUNTER — ORDER TRANSCRIPTION (OUTPATIENT)
Dept: ADMINISTRATIVE | Facility: HOSPITAL | Age: 53
End: 2018-11-15

## 2018-11-15 DIAGNOSIS — Z94.1 HEART REPLACED BY TRANSPLANT (HCC): Primary | ICD-10-CM

## 2018-11-23 ENCOUNTER — RT VISIT (OUTPATIENT)
Dept: RESPIRATORY THERAPY | Facility: HOSPITAL | Age: 53
End: 2018-11-23
Attending: INTERNAL MEDICINE
Payer: COMMERCIAL

## 2018-11-23 ENCOUNTER — HOSPITAL ENCOUNTER (OUTPATIENT)
Dept: CV DIAGNOSTICS | Facility: HOSPITAL | Age: 53
Discharge: HOME | End: 2018-11-23
Attending: INTERNAL MEDICINE
Payer: COMMERCIAL

## 2018-11-23 DIAGNOSIS — Z94.1 H/O HEART TRANSPLANT (HCC): ICD-10-CM

## 2018-11-23 DIAGNOSIS — Z94.1 HEART REPLACED BY TRANSPLANT (HCC): ICD-10-CM

## 2018-11-23 PROCEDURE — 93227 XTRNL ECG REC<48 HR R&I: CPT | Performed by: INTERNAL MEDICINE

## 2018-11-23 PROCEDURE — 93225 XTRNL ECG REC<48 HRS REC: CPT | Performed by: INTERNAL MEDICINE

## 2018-11-27 NOTE — PROCEDURES
Spirometry and flow volume loop appear normal with no evidence of airway obstruction     Normal TLC, no evidence of restriction  There is mild increased RV / TLC ratio with normal TLC , suggestive of air trapping     The diffusing capacity is moderately de

## 2018-12-03 NOTE — HISTORICAL OFFICE NOTE
Sigifredo Aldrich  : 1965  ACCOUNT:  [id number]  [phone]  PCP: Dr. Javier Campbell: 2018  DICTATED BY:  [Dr. Martín Olsen: [Followup of Heart transplant, Followup of Hypothyroidism and on replacement. dizziness. HEM/LYMPH: easy bruising. ALL: no new food or enviornmental allergies.       PAST HISTORY: hypothyroidism, cholecystectomy 1990 and lithotripsy 1995    PAST CV HISTORY: [address] ICD 2014, cardiac catheterization January 2016, cath 1 25MG      one tablet daily w/50mg dose             05/04/17 Dexilant              60MG      one capsule daily                        05/04/17 Mycophenolate Mofetil 500MG     one tablet three times daily             05/04/17 Tacrolimus            1MG

## 2018-12-06 ENCOUNTER — HOSPITAL ENCOUNTER (OUTPATIENT)
Dept: INTERVENTIONAL RADIOLOGY/VASCULAR | Facility: HOSPITAL | Age: 53
Discharge: HOME | End: 2018-12-06
Attending: INTERNAL MEDICINE
Payer: COMMERCIAL

## 2018-12-07 ENCOUNTER — HOSPITAL ENCOUNTER (OUTPATIENT)
Dept: GENERAL RADIOLOGY | Age: 53
Discharge: HOME | End: 2018-12-07
Attending: INTERNAL MEDICINE
Payer: COMMERCIAL

## 2018-12-07 ENCOUNTER — HOSPITAL ENCOUNTER (OUTPATIENT)
Dept: BONE DENSITY | Age: 53
Discharge: HOME | End: 2018-12-07
Attending: INTERNAL MEDICINE
Payer: COMMERCIAL

## 2018-12-07 ENCOUNTER — HOSPITAL ENCOUNTER (OUTPATIENT)
Dept: MAMMOGRAPHY | Age: 53
Discharge: HOME | End: 2018-12-07
Attending: INTERNAL MEDICINE
Payer: COMMERCIAL

## 2018-12-07 DIAGNOSIS — Z12.31 ENCOUNTER FOR SCREENING MAMMOGRAM FOR MALIGNANT NEOPLASM OF BREAST: ICD-10-CM

## 2018-12-07 DIAGNOSIS — Z13.820 ENCOUNTER FOR IMAGING TO ASSESS OSTEOPOROSIS: ICD-10-CM

## 2018-12-07 DIAGNOSIS — Z94.1 H/O HEART TRANSPLANT (HCC): ICD-10-CM

## 2018-12-07 PROCEDURE — 77080 DXA BONE DENSITY AXIAL: CPT | Performed by: INTERNAL MEDICINE

## 2018-12-07 PROCEDURE — 77063 BREAST TOMOSYNTHESIS BI: CPT | Performed by: INTERNAL MEDICINE

## 2018-12-07 PROCEDURE — 77067 SCR MAMMO BI INCL CAD: CPT | Performed by: INTERNAL MEDICINE

## 2018-12-07 PROCEDURE — 71046 X-RAY EXAM CHEST 2 VIEWS: CPT | Performed by: INTERNAL MEDICINE

## 2018-12-13 ENCOUNTER — PRIOR ORIGINAL RECORDS (OUTPATIENT)
Dept: OTHER | Age: 53
End: 2018-12-13

## 2018-12-13 ENCOUNTER — LAB ENCOUNTER (OUTPATIENT)
Dept: LAB | Facility: HOSPITAL | Age: 53
End: 2018-12-13
Attending: INTERNAL MEDICINE
Payer: COMMERCIAL

## 2018-12-13 DIAGNOSIS — I50.40 COMBINED SYSTOLIC AND DIASTOLIC HEART FAILURE (HCC): ICD-10-CM

## 2018-12-13 DIAGNOSIS — E10.9 DIABETES MELLITUS TYPE I (HCC): ICD-10-CM

## 2018-12-13 DIAGNOSIS — D52.0 MEGALOBLASTIC ANEMIA OF PREMATURE INFANT: Primary | ICD-10-CM

## 2018-12-13 DIAGNOSIS — R78.81 BACTEREMIA: ICD-10-CM

## 2018-12-13 PROCEDURE — 80061 LIPID PANEL: CPT

## 2018-12-13 PROCEDURE — 80053 COMPREHEN METABOLIC PANEL: CPT

## 2018-12-13 PROCEDURE — 80197 ASSAY OF TACROLIMUS: CPT

## 2018-12-13 PROCEDURE — 83735 ASSAY OF MAGNESIUM: CPT

## 2018-12-13 PROCEDURE — 85025 COMPLETE CBC W/AUTO DIFF WBC: CPT

## 2018-12-13 PROCEDURE — 80180 DRUG SCRN QUAN MYCOPHENOLATE: CPT

## 2018-12-13 PROCEDURE — 84443 ASSAY THYROID STIM HORMONE: CPT

## 2018-12-13 PROCEDURE — 83036 HEMOGLOBIN GLYCOSYLATED A1C: CPT

## 2018-12-13 PROCEDURE — 82306 VITAMIN D 25 HYDROXY: CPT

## 2018-12-13 PROCEDURE — 36415 COLL VENOUS BLD VENIPUNCTURE: CPT

## 2018-12-14 ENCOUNTER — PRIOR ORIGINAL RECORDS (OUTPATIENT)
Dept: OTHER | Age: 53
End: 2018-12-14

## 2018-12-14 ENCOUNTER — MYAURORA ACCOUNT LINK (OUTPATIENT)
Dept: OTHER | Age: 53
End: 2018-12-14

## 2018-12-14 ENCOUNTER — LAB ENCOUNTER (OUTPATIENT)
Dept: LAB | Facility: HOSPITAL | Age: 53
End: 2018-12-14
Attending: INTERNAL MEDICINE
Payer: COMMERCIAL

## 2018-12-14 DIAGNOSIS — Z94.1 HEART REPLACED BY TRANSPLANT (HCC): ICD-10-CM

## 2018-12-14 DIAGNOSIS — R05.9 COUGH: Primary | ICD-10-CM

## 2018-12-14 PROCEDURE — 87633 RESP VIRUS 12-25 TARGETS: CPT

## 2018-12-14 PROCEDURE — 87486 CHLMYD PNEUM DNA AMP PROBE: CPT

## 2018-12-14 PROCEDURE — 87449 NOS EACH ORGANISM AG IA: CPT

## 2018-12-14 PROCEDURE — 87581 M.PNEUMON DNA AMP PROBE: CPT

## 2018-12-14 PROCEDURE — 87798 DETECT AGENT NOS DNA AMP: CPT

## 2018-12-18 RX ORDER — TACROLIMUS 1 MG/1
2 CAPSULE ORAL 2 TIMES DAILY
COMMUNITY

## 2018-12-18 RX ORDER — MYCOPHENOLATE MOFETIL 500 MG/1
500 TABLET ORAL
COMMUNITY

## 2018-12-18 NOTE — H&P
Ceceliagraham Tracy  : 1965  ACCOUNT:  [id number]  [phone]  PCP: Dr. Sanjeev Boudreaux     TODAY'S DATE: 2018  DICTATED BY:  [Dr. Barajas Civil      CHIEF COMPLAINT: [Followup of .  Heart failure, systolic and diastolic and Followup of Heart tr lithotripsy 1995    PAST CV HISTORY: [address] ICD 2014, cardiac catheterization January 2016, cath 1/20/2014 no significant CAD, left right cardiac catheterization December 2017, MAZE procedure TV repair 5/9/16, myocarditis,lymphocytic, perma twice daily w/0.5mg capsul     01/19/17 Aspirin               81MG      one tablet daily                         01/19/17 Atorvastatin Calcium  20MG      one tablet at bedtime                    01/19/17 Calcitrate Plus D     315-200M  one tablet twice katherine

## 2018-12-20 ENCOUNTER — HOSPITAL ENCOUNTER (OUTPATIENT)
Dept: INTERVENTIONAL RADIOLOGY/VASCULAR | Facility: HOSPITAL | Age: 53
Discharge: HOME | End: 2018-12-20
Attending: INTERNAL MEDICINE | Admitting: INTERNAL MEDICINE
Payer: COMMERCIAL

## 2018-12-20 VITALS
HEART RATE: 83 BPM | WEIGHT: 215 LBS | BODY MASS INDEX: 39.56 KG/M2 | TEMPERATURE: 98 F | HEIGHT: 62 IN | DIASTOLIC BLOOD PRESSURE: 62 MMHG | OXYGEN SATURATION: 99 % | RESPIRATION RATE: 16 BRPM | SYSTOLIC BLOOD PRESSURE: 131 MMHG

## 2018-12-20 DIAGNOSIS — Z94.1 HEART TRANSPLANT RECIPIENT (HCC): ICD-10-CM

## 2018-12-20 PROCEDURE — 85347 COAGULATION TIME ACTIVATED: CPT

## 2018-12-20 PROCEDURE — 4A023N6 MEASUREMENT OF CARDIAC SAMPLING AND PRESSURE, RIGHT HEART, PERCUTANEOUS APPROACH: ICD-10-PCS | Performed by: INTERNAL MEDICINE

## 2018-12-20 PROCEDURE — B2101ZZ FLUOROSCOPY OF SINGLE CORONARY ARTERY USING LOW OSMOLAR CONTRAST: ICD-10-PCS | Performed by: INTERNAL MEDICINE

## 2018-12-20 PROCEDURE — 93005 ELECTROCARDIOGRAM TRACING: CPT

## 2018-12-20 PROCEDURE — 02BL3ZX EXCISION OF LEFT VENTRICLE, PERCUTANEOUS APPROACH, DIAGNOSTIC: ICD-10-PCS | Performed by: INTERNAL MEDICINE

## 2018-12-20 PROCEDURE — 93010 ELECTROCARDIOGRAM REPORT: CPT | Performed by: INTERNAL MEDICINE

## 2018-12-20 PROCEDURE — 92978 ENDOLUMINL IVUS OCT C 1ST: CPT

## 2018-12-20 PROCEDURE — B240ZZ3 ULTRASONOGRAPHY OF SINGLE CORONARY ARTERY, INTRAVASCULAR: ICD-10-PCS | Performed by: INTERNAL MEDICINE

## 2018-12-20 PROCEDURE — 88348 ELECTRON MICROSCOPY DX: CPT | Performed by: INTERNAL MEDICINE

## 2018-12-20 PROCEDURE — 88307 TISSUE EXAM BY PATHOLOGIST: CPT | Performed by: INTERNAL MEDICINE

## 2018-12-20 PROCEDURE — 93505 ENDOMYOCARDIAL BIOPSY: CPT

## 2018-12-20 PROCEDURE — 99152 MOD SED SAME PHYS/QHP 5/>YRS: CPT

## 2018-12-20 PROCEDURE — 88313 SPECIAL STAINS GROUP 2: CPT | Performed by: INTERNAL MEDICINE

## 2018-12-20 PROCEDURE — 99153 MOD SED SAME PHYS/QHP EA: CPT

## 2018-12-20 PROCEDURE — 93456 R HRT CORONARY ARTERY ANGIO: CPT

## 2018-12-20 RX ORDER — LIDOCAINE HYDROCHLORIDE 10 MG/ML
INJECTION, SOLUTION EPIDURAL; INFILTRATION; INTRACAUDAL; PERINEURAL
Status: COMPLETED
Start: 2018-12-20 | End: 2018-12-20

## 2018-12-20 RX ORDER — MIDAZOLAM HYDROCHLORIDE 1 MG/ML
INJECTION INTRAMUSCULAR; INTRAVENOUS
Status: COMPLETED
Start: 2018-12-20 | End: 2018-12-20

## 2018-12-20 RX ORDER — SODIUM CHLORIDE 9 MG/ML
INJECTION, SOLUTION INTRAVENOUS CONTINUOUS
Status: DISCONTINUED | OUTPATIENT
Start: 2018-12-20 | End: 2018-12-20

## 2018-12-20 RX ADMIN — SODIUM CHLORIDE: 9 INJECTION, SOLUTION INTRAVENOUS at 09:30:00

## 2018-12-20 NOTE — PROCEDURES
[facility]     PATIENT'S NAME: Coco MIR   ATTENDING PHYSICIAN: Maru Jones M.D.    OPERATING PHYSICIAN: Carlos Martinez M.D.   [facility]# [id number]  # [id number]  YOB: 1965  OUTPATIENT OPERATION DATE:  12/20/2018     CARD catheterization was performed using [place]-Peggy catheter which was advanced to the pulmonary artery. We measured right heart pressures and calculated her cardiac output using a Rubens equation on room air.   We also obtained saturation from the right atrium an at all times by the nurse and myself.     RIGHT HEART CATHETERIZATION HEMODYNAMICS:  Right atrial pressure 1 mmHg. Right ventricle pressure 24/-1/2 mmHg. Pulmonary artery pressure 24/3/13 mmHg and wedge pressure 5 mmHg.   Transpulmonary gradient was 8 mmH hyperplasia seen by intracoronary ultrasound, but there was very small calcified plaque near the ostium of the LAD artery, nonsignificant, seen by ultrasound and left main coronary artery had mild eccentric plaque only in one spot, otherwise there was no i

## 2018-12-20 NOTE — H&P
AUDREY BADILLO  : 1965    History reviewed and up to date. No changes to H&P.  The patient is scheduled for RHC, LHC and intracoronary ultrasound of LMCA and LAD artery to assess for allograft vasculopathy in transplanted heart as a part of annual ch elevated alkaline phosphatase of 405 TSH was normal vitamin D was in the lower limits of normal.  On exam she had clear lungs bilaterally. Sinus tachycardia was noted.   There was no lower extremity edema.           NYHA Class: 2  RISK FACTORS:  CAD - Weig and nontender. MS: adequate gait for exercise/testing. EXT: edema. SKIN: warm to touch. NEURO/PSYCH: normal gait, normal balance, normal motor, alert, oriented to time and place and person.       CV: PALP: palpable gallop.  AUSC:  regular rhythm; no patho

## 2018-12-20 NOTE — PROGRESS NOTES
S/p R/LHC with bx. Pt A/Ox4. VEGA. Right groin with dressing, CDI - soft, no hematoma, +pulses. VSS. Right IJ sheath pulled at bedside at 1300, manual pressure held for 10 minutes; dressing placed -site CDI, no hematoma. Denies any pain.  Voiding without dif

## 2018-12-28 LAB
ALBUMIN: 2.9 G/DL
ALKALINE PHOSPHATATE(ALK PHOS): 405 IU/L
BILIRUBIN TOTAL: 0.5 MG/DL
BUN: 23 MG/DL
CALCIUM: 8.9 MG/DL
CHLORIDE: 112 MEQ/L
CHOLESTEROL, TOTAL: 134 MG/DL
CREATININE, SERUM: 1.35 MG/DL
GLOBULIN: 3.9 G/DL
GLUCOSE: 95 MG/DL
HDL CHOLESTEROL: 48 MG/DL
HEMATOCRIT: 33.2 %
HEMOGLOBIN A1C: 5.2 %
HEMOGLOBIN: 10.4 G/DL
LDL CHOLESTEROL: 71 MG/DL
MAGNESIUM: 2.2 MG/DL
PLATELETS: 240 K/UL
POTASSIUM, SERUM: 5.2 MEQ/L
PROTEIN, TOTAL: 6.8 G/DL
RED BLOOD COUNT: 3.68 X 10-6/U
SGOT (AST): 49 IU/L
SGPT (ALT): 40 IU/L
SODIUM: 143 MEQ/L
THYROID STIMULATING HORMONE: 1.56 MLU/L
TRIGLYCERIDES: 76 MG/DL
VITAMIN D 25-OH: 32.3 NG/ML
WHITE BLOOD COUNT: 4.7 X 10-3/U

## 2019-01-03 LAB
MPA GLUCURONIDE: 51.8
MYCOPHENOLIC ACID LEVEL: <0.5
TACROLIMUS TROUGH LEVEL: 9

## 2019-02-28 VITALS
DIASTOLIC BLOOD PRESSURE: 72 MMHG | WEIGHT: 215 LBS | SYSTOLIC BLOOD PRESSURE: 146 MMHG | HEIGHT: 63 IN | HEART RATE: 86 BPM | BODY MASS INDEX: 38.09 KG/M2

## 2019-02-28 VITALS
HEART RATE: 80 BPM | OXYGEN SATURATION: 98 % | DIASTOLIC BLOOD PRESSURE: 70 MMHG | BODY MASS INDEX: 34.12 KG/M2 | SYSTOLIC BLOOD PRESSURE: 132 MMHG | HEIGHT: 63 IN | WEIGHT: 192.6 LBS

## 2019-02-28 VITALS
SYSTOLIC BLOOD PRESSURE: 122 MMHG | HEART RATE: 74 BPM | DIASTOLIC BLOOD PRESSURE: 60 MMHG | HEIGHT: 63 IN | WEIGHT: 186 LBS | BODY MASS INDEX: 32.96 KG/M2

## 2019-02-28 VITALS
HEART RATE: 73 BPM | WEIGHT: 187 LBS | HEIGHT: 63 IN | SYSTOLIC BLOOD PRESSURE: 132 MMHG | BODY MASS INDEX: 33.13 KG/M2 | DIASTOLIC BLOOD PRESSURE: 66 MMHG

## 2019-02-28 VITALS
BODY MASS INDEX: 36.32 KG/M2 | SYSTOLIC BLOOD PRESSURE: 88 MMHG | HEIGHT: 63 IN | HEART RATE: 90 BPM | DIASTOLIC BLOOD PRESSURE: 52 MMHG | WEIGHT: 205 LBS

## 2019-02-28 VITALS
BODY MASS INDEX: 33.49 KG/M2 | WEIGHT: 189 LBS | HEIGHT: 63 IN | HEART RATE: 77 BPM | SYSTOLIC BLOOD PRESSURE: 142 MMHG | DIASTOLIC BLOOD PRESSURE: 80 MMHG | OXYGEN SATURATION: 98 %

## 2019-03-01 VITALS
HEIGHT: 63 IN | DIASTOLIC BLOOD PRESSURE: 76 MMHG | SYSTOLIC BLOOD PRESSURE: 142 MMHG | WEIGHT: 178 LBS | BODY MASS INDEX: 31.54 KG/M2 | HEART RATE: 75 BPM

## 2019-03-01 VITALS
WEIGHT: 172 LBS | BODY MASS INDEX: 30.48 KG/M2 | SYSTOLIC BLOOD PRESSURE: 147 MMHG | DIASTOLIC BLOOD PRESSURE: 72 MMHG | HEIGHT: 63 IN | HEART RATE: 83 BPM

## 2019-05-22 RX ORDER — LOSARTAN POTASSIUM 50 MG/1
50 TABLET ORAL DAILY
Qty: 30 TABLET | Refills: 0 | Status: SHIPPED | OUTPATIENT
Start: 2019-05-22 | End: 2019-06-22 | Stop reason: SDUPTHER

## 2019-05-22 RX ORDER — LOSARTAN POTASSIUM 50 MG/1
TABLET ORAL
Qty: 90 TABLET | Refills: 0 | OUTPATIENT
Start: 2019-05-22

## 2019-06-24 RX ORDER — LOSARTAN POTASSIUM 50 MG/1
TABLET ORAL
Qty: 30 TABLET | Refills: 2 | Status: SHIPPED | OUTPATIENT
Start: 2019-06-24 | End: 2019-09-18 | Stop reason: SDUPTHER

## 2019-07-05 ENCOUNTER — TELEPHONE (OUTPATIENT)
Dept: CARDIOLOGY | Age: 54
End: 2019-07-05

## 2019-09-18 RX ORDER — LOSARTAN POTASSIUM 50 MG/1
TABLET ORAL
Qty: 30 TABLET | Refills: 0 | Status: SHIPPED | OUTPATIENT
Start: 2019-09-18 | End: 2019-10-17 | Stop reason: SDUPTHER

## 2019-09-20 ENCOUNTER — TELEPHONE (OUTPATIENT)
Dept: CARDIOLOGY | Age: 54
End: 2019-09-20

## 2019-09-20 DIAGNOSIS — Z94.9 TRANSPLANT: ICD-10-CM

## 2019-09-20 DIAGNOSIS — E03.9 ACQUIRED HYPOTHYROIDISM: Primary | ICD-10-CM

## 2019-09-21 ENCOUNTER — LAB ENCOUNTER (OUTPATIENT)
Dept: LAB | Facility: HOSPITAL | Age: 54
End: 2019-09-21
Attending: INTERNAL MEDICINE
Payer: COMMERCIAL

## 2019-09-21 DIAGNOSIS — E03.9 MYXEDEMA HEART DISEASE: Primary | ICD-10-CM

## 2019-09-21 DIAGNOSIS — I51.9 MYXEDEMA HEART DISEASE: Primary | ICD-10-CM

## 2019-09-21 DIAGNOSIS — Z94.9 UNSPECIFIED ORGAN OR TISSUE REPLACED BY TRANSPLANT: ICD-10-CM

## 2019-09-21 LAB
ALBUMIN SERPL-MCNC: 3.1 G/DL (ref 3.4–5)
ALBUMIN/GLOB SERPL: 0.8 {RATIO} (ref 1–2)
ALP LIVER SERPL-CCNC: 232 U/L (ref 41–108)
ALT SERPL-CCNC: 32 U/L (ref 13–56)
ANION GAP SERPL CALC-SCNC: 7 MMOL/L (ref 0–18)
AST SERPL-CCNC: 31 U/L (ref 15–37)
BILIRUB SERPL-MCNC: 0.5 MG/DL (ref 0.1–2)
BUN BLD-MCNC: 34 MG/DL (ref 7–18)
BUN/CREAT SERPL: 19.2 (ref 10–20)
CALCIUM BLD-MCNC: 8.8 MG/DL (ref 8.5–10.1)
CHLORIDE SERPL-SCNC: 111 MMOL/L (ref 98–112)
CHOLEST SMN-MCNC: 150 MG/DL (ref ?–200)
CO2 SERPL-SCNC: 25 MMOL/L (ref 21–32)
CREAT BLD-MCNC: 1.77 MG/DL (ref 0.55–1.02)
DEPRECATED RDW RBC AUTO: 42.8 FL (ref 35.1–46.3)
ERYTHROCYTE [DISTWIDTH] IN BLOOD BY AUTOMATED COUNT: 13 % (ref 11–15)
EST. AVERAGE GLUCOSE BLD GHB EST-MCNC: 111 MG/DL (ref 68–126)
GLOBULIN PLAS-MCNC: 3.8 G/DL (ref 2.8–4.4)
GLUCOSE BLD-MCNC: 96 MG/DL (ref 70–99)
HBA1C MFR BLD HPLC: 5.5 % (ref ?–5.7)
HCT VFR BLD AUTO: 34.2 % (ref 35–48)
HDLC SERPL-MCNC: 53 MG/DL (ref 40–59)
HGB BLD-MCNC: 10.9 G/DL (ref 12–16)
LDLC SERPL CALC-MCNC: 80 MG/DL (ref ?–100)
M PROTEIN MFR SERPL ELPH: 6.9 G/DL (ref 6.4–8.2)
MCH RBC QN AUTO: 28.8 PG (ref 26–34)
MCHC RBC AUTO-ENTMCNC: 31.9 G/DL (ref 31–37)
MCV RBC AUTO: 90.2 FL (ref 80–100)
NONHDLC SERPL-MCNC: 97 MG/DL (ref ?–130)
NT-PROBNP SERPL-MCNC: 1006 PG/ML (ref ?–125)
OSMOLALITY SERPL CALC.SUM OF ELEC: 303 MOSM/KG (ref 275–295)
PLATELET # BLD AUTO: 189 10(3)UL (ref 150–450)
POTASSIUM SERPL-SCNC: 5.2 MMOL/L (ref 3.5–5.1)
RBC # BLD AUTO: 3.79 X10(6)UL (ref 3.8–5.3)
SODIUM SERPL-SCNC: 143 MMOL/L (ref 136–145)
TRIGL SERPL-MCNC: 84 MG/DL (ref 30–149)
TSI SER-ACNC: 2.28 MIU/ML (ref 0.36–3.74)
VLDLC SERPL CALC-MCNC: 17 MG/DL (ref 0–30)
WBC # BLD AUTO: 6.2 X10(3) UL (ref 4–11)

## 2019-09-21 PROCEDURE — 36415 COLL VENOUS BLD VENIPUNCTURE: CPT

## 2019-09-21 PROCEDURE — 84443 ASSAY THYROID STIM HORMONE: CPT

## 2019-09-21 PROCEDURE — 80061 LIPID PANEL: CPT

## 2019-09-21 PROCEDURE — 83036 HEMOGLOBIN GLYCOSYLATED A1C: CPT

## 2019-09-21 PROCEDURE — 80180 DRUG SCRN QUAN MYCOPHENOLATE: CPT

## 2019-09-21 PROCEDURE — 80197 ASSAY OF TACROLIMUS: CPT

## 2019-09-21 PROCEDURE — 83880 ASSAY OF NATRIURETIC PEPTIDE: CPT

## 2019-09-21 PROCEDURE — 80053 COMPREHEN METABOLIC PANEL: CPT

## 2019-09-21 PROCEDURE — 85027 COMPLETE CBC AUTOMATED: CPT

## 2019-09-23 LAB
MYCOPHENOLIC ACID GLUCURONIDE: 64.3 UG/ML
MYCOPHENOLIC ACID: 1.9 UG/ML
TACROLIMUS: 11.7 NG/ML

## 2019-09-25 ENCOUNTER — OFFICE VISIT (OUTPATIENT)
Dept: CARDIOLOGY | Age: 54
End: 2019-09-25

## 2019-09-25 VITALS
HEIGHT: 63 IN | SYSTOLIC BLOOD PRESSURE: 132 MMHG | WEIGHT: 234.2 LBS | BODY MASS INDEX: 41.5 KG/M2 | HEART RATE: 85 BPM | DIASTOLIC BLOOD PRESSURE: 84 MMHG

## 2019-09-25 DIAGNOSIS — E53.8 FOLIC ACID DEFICIENCY: ICD-10-CM

## 2019-09-25 DIAGNOSIS — E03.9 HYPOTHYROIDISM, UNSPECIFIED TYPE: ICD-10-CM

## 2019-09-25 DIAGNOSIS — I48.91 ATRIAL FIBRILLATION, UNSPECIFIED TYPE (CMD): ICD-10-CM

## 2019-09-25 DIAGNOSIS — D64.2 SECONDARY SIDEROBLASTIC ANEMIA DUE TO DRUGS AND TOXINS (CMD): ICD-10-CM

## 2019-09-25 DIAGNOSIS — K76.0 FATTY LIVER: ICD-10-CM

## 2019-09-25 DIAGNOSIS — E78.5 HYPERLIPOPROTEINEMIA: ICD-10-CM

## 2019-09-25 DIAGNOSIS — Z94.9 TRANSPLANT: Primary | ICD-10-CM

## 2019-09-25 PROCEDURE — 99214 OFFICE O/P EST MOD 30 MIN: CPT | Performed by: INTERNAL MEDICINE

## 2019-09-25 RX ORDER — ASPIRIN 81 MG/1
81 TABLET ORAL DAILY
COMMUNITY
Start: 2016-12-19

## 2019-09-25 RX ORDER — ATORVASTATIN CALCIUM 20 MG/1
20 TABLET, FILM COATED ORAL DAILY
COMMUNITY
Start: 2017-01-19

## 2019-09-25 RX ORDER — FOLIC ACID 1 MG/1
TABLET ORAL
COMMUNITY
Start: 2019-08-21

## 2019-09-25 RX ORDER — LISINOPRIL 5 MG/1
5 TABLET ORAL
COMMUNITY
Start: 2016-06-10 | End: 2019-09-25 | Stop reason: ALTCHOICE

## 2019-09-25 RX ORDER — LEVOTHYROXINE SODIUM 88 UG/1
TABLET ORAL
COMMUNITY
Start: 2016-10-19

## 2019-09-25 RX ORDER — DOXEPIN HYDROCHLORIDE 50 MG/1
1 CAPSULE ORAL DAILY
COMMUNITY

## 2019-09-25 RX ORDER — DESOXIMETASONE 2.5 MG/G
1 CREAM TOPICAL PRN
COMMUNITY

## 2019-09-25 RX ORDER — TACROLIMUS 1 MG/1
1 CAPSULE ORAL SEE ADMIN INSTRUCTIONS
COMMUNITY
Start: 2017-05-04 | End: 2020-06-23 | Stop reason: DRUGHIGH

## 2019-09-25 RX ORDER — MYCOPHENOLATE MOFETIL 500 MG/1
TABLET ORAL
COMMUNITY
Start: 2017-05-04 | End: 2021-06-01 | Stop reason: SDUPTHER

## 2019-09-25 SDOH — HEALTH STABILITY: MENTAL HEALTH: HOW OFTEN DO YOU HAVE A DRINK CONTAINING ALCOHOL?: NEVER

## 2019-09-25 ASSESSMENT — ENCOUNTER SYMPTOMS
FEVER: 0
ALLERGIC/IMMUNOLOGIC COMMENTS: NO NEW FOOD ALLERGIES
HEMATOCHEZIA: 0
COUGH: 0
HEMOPTYSIS: 0
BRUISES/BLEEDS EASILY: 0
CHILLS: 0
SUSPICIOUS LESIONS: 0
WEIGHT GAIN: 0
WEIGHT LOSS: 0

## 2019-09-25 ASSESSMENT — PATIENT HEALTH QUESTIONNAIRE - PHQ9
2. FEELING DOWN, DEPRESSED OR HOPELESS: SEVERAL DAYS
SUM OF ALL RESPONSES TO PHQ9 QUESTIONS 1 AND 2: 2
1. LITTLE INTEREST OR PLEASURE IN DOING THINGS: SEVERAL DAYS
SUM OF ALL RESPONSES TO PHQ9 QUESTIONS 1 AND 2: 2

## 2019-09-30 ENCOUNTER — TELEPHONE (OUTPATIENT)
Dept: CARDIOLOGY | Age: 54
End: 2019-09-30

## 2019-10-01 ENCOUNTER — TELEPHONE (OUTPATIENT)
Dept: CARDIOLOGY | Age: 54
End: 2019-10-01

## 2019-10-17 RX ORDER — LOSARTAN POTASSIUM 50 MG/1
TABLET ORAL
Qty: 30 TABLET | Refills: 2 | Status: SHIPPED | OUTPATIENT
Start: 2019-10-17 | End: 2020-01-21 | Stop reason: SDUPTHER

## 2019-10-21 ENCOUNTER — TELEPHONE (OUTPATIENT)
Dept: CARDIOLOGY | Age: 54
End: 2019-10-21

## 2019-10-21 RX ORDER — CLINDAMYCIN HYDROCHLORIDE 300 MG/1
CAPSULE ORAL
Qty: 2 CAPSULE | Refills: 0 | Status: SHIPPED | OUTPATIENT
Start: 2019-10-21 | End: 2019-12-01 | Stop reason: ALTCHOICE

## 2019-10-23 ENCOUNTER — ORDER TRANSCRIPTION (OUTPATIENT)
Dept: ADMINISTRATIVE | Facility: HOSPITAL | Age: 54
End: 2019-10-23

## 2019-10-23 ENCOUNTER — TELEPHONE (OUTPATIENT)
Dept: CARDIOLOGY | Age: 54
End: 2019-10-23

## 2019-10-23 DIAGNOSIS — D64.2: ICD-10-CM

## 2019-10-23 DIAGNOSIS — E53.8 FOLIC ACID DEFICIENCY (NON ANEMIC): ICD-10-CM

## 2019-10-23 DIAGNOSIS — K76.0 FATTY LIVER: ICD-10-CM

## 2019-10-23 DIAGNOSIS — E78.5 HYPERLIPOPROTEINEMIA: ICD-10-CM

## 2019-10-23 DIAGNOSIS — E03.9 HYPOTHYROIDISM, UNSPECIFIED: ICD-10-CM

## 2019-10-23 DIAGNOSIS — Z12.39 SCREENING FOR BREAST CANCER: ICD-10-CM

## 2019-10-23 DIAGNOSIS — Z94.9 TRANSPLANT: Primary | ICD-10-CM

## 2019-10-23 DIAGNOSIS — I48.91 ATRIAL FIBRILLATION (HCC): Primary | ICD-10-CM

## 2019-10-23 DIAGNOSIS — Z94.9 TRANSPLANT: ICD-10-CM

## 2019-11-13 ENCOUNTER — HOSPITAL ENCOUNTER (OUTPATIENT)
Dept: GENERAL RADIOLOGY | Facility: HOSPITAL | Age: 54
Discharge: HOME | End: 2019-11-13
Attending: INTERNAL MEDICINE
Payer: COMMERCIAL

## 2019-11-13 ENCOUNTER — RT VISIT (OUTPATIENT)
Dept: RESPIRATORY THERAPY | Facility: HOSPITAL | Age: 54
End: 2019-11-13
Attending: INTERNAL MEDICINE
Payer: COMMERCIAL

## 2019-11-13 ENCOUNTER — HOSPITAL ENCOUNTER (OUTPATIENT)
Dept: CV DIAGNOSTICS | Facility: HOSPITAL | Age: 54
Discharge: HOME | End: 2019-11-13
Attending: INTERNAL MEDICINE
Payer: COMMERCIAL

## 2019-11-13 DIAGNOSIS — I48.91 ATRIAL FIBRILLATION, UNSPECIFIED TYPE (HCC): ICD-10-CM

## 2019-11-13 DIAGNOSIS — E53.8 FOLIC ACID DEFICIENCY (NON ANEMIC): ICD-10-CM

## 2019-11-13 DIAGNOSIS — I48.91 ATRIAL FIBRILLATION (HCC): ICD-10-CM

## 2019-11-13 DIAGNOSIS — D64.2: ICD-10-CM

## 2019-11-13 DIAGNOSIS — K76.0 FATTY LIVER: ICD-10-CM

## 2019-11-13 DIAGNOSIS — E78.5 HYPERLIPOPROTEINEMIA: ICD-10-CM

## 2019-11-13 DIAGNOSIS — Z94.9 TRANSPLANT: ICD-10-CM

## 2019-11-13 DIAGNOSIS — E53.8 FOLIC ACID DEFICIENCY: ICD-10-CM

## 2019-11-13 DIAGNOSIS — E03.9 HYPOTHYROIDISM, UNSPECIFIED TYPE: ICD-10-CM

## 2019-11-13 DIAGNOSIS — E03.9 HYPOTHYROIDISM, UNSPECIFIED: ICD-10-CM

## 2019-11-13 PROCEDURE — 93350 STRESS TTE ONLY: CPT | Performed by: INTERNAL MEDICINE

## 2019-11-13 PROCEDURE — 94010 BREATHING CAPACITY TEST: CPT

## 2019-11-13 PROCEDURE — 94729 DIFFUSING CAPACITY: CPT

## 2019-11-13 PROCEDURE — 93017 CV STRESS TEST TRACING ONLY: CPT | Performed by: INTERNAL MEDICINE

## 2019-11-13 PROCEDURE — 71046 X-RAY EXAM CHEST 2 VIEWS: CPT | Performed by: INTERNAL MEDICINE

## 2019-11-13 PROCEDURE — 93018 CV STRESS TEST I&R ONLY: CPT | Performed by: INTERNAL MEDICINE

## 2019-11-13 PROCEDURE — 94726 PLETHYSMOGRAPHY LUNG VOLUMES: CPT

## 2019-11-13 RX ORDER — DOBUTAMINE HYDROCHLORIDE 200 MG/100ML
INJECTION INTRAVENOUS
Status: COMPLETED
Start: 2019-11-13 | End: 2019-11-13

## 2019-11-13 RX ADMIN — DOBUTAMINE HYDROCHLORIDE 10 MCG/KG/MIN: 200 INJECTION INTRAVENOUS at 14:00:00

## 2019-11-13 NOTE — PROCEDURES
Findings:  FEV1 is 1.84L, 74% predicted. FVC is 2.19L, 71% predicted. FEV1/ FVC ratio is 0.84. The flow-volume loop demonstrates a normal pattern. The TLC is 3.95L, 86% predicted. The residual volume 1.76L, 102% predicted.   The diffusion capacity is 6

## 2019-11-14 NOTE — HISTORICAL OFFICE NOTE
Progress Notes  - documented in this encounter  Sergei Burns MD - 09/25/2019 2:00 PM CDT  Formatting of this note might be different from the original.  Advanced Heart Failure Progress Note    Subjective:   The patient returns today stating that sh 85, height 5' 3\" (1.6 m), weight 106.2 kg (234 lb 3.2 oz). General: Morbidly obese   HEENT: Pupils equal reacting to light and accommodation; no thyromegaly no cervical lymphadenopathy. Neck: No JVD, carotids 2+ no bruits.   Cardiac: Regular rate and r strain: Not on file   • Food insecurity:   Worry: Not on file   Inability: Not on file   • Transportation needs:   Medical: Not on file   Non-medical: Not on file   Tobacco Use   • Smoking status: Former Smoker   Packs/day: 0.00   • Smokeless tobacco: [facility]  [address] [place], [address] [address] [place]  Phone: [phone]  Fax: [phone]  E-mail: [email or web address]  9/25/2019  2:40 PM      Electronically signed by Susie Billingsley MD at 09/25/2019 3:1

## 2019-11-18 ENCOUNTER — HOSPITAL ENCOUNTER (OUTPATIENT)
Dept: INTERVENTIONAL RADIOLOGY/VASCULAR | Facility: HOSPITAL | Age: 54
Discharge: HOME | End: 2019-11-18
Attending: INTERNAL MEDICINE | Admitting: INTERNAL MEDICINE
Payer: COMMERCIAL

## 2019-11-18 VITALS
OXYGEN SATURATION: 95 % | HEART RATE: 84 BPM | WEIGHT: 235 LBS | HEIGHT: 62 IN | RESPIRATION RATE: 18 BRPM | BODY MASS INDEX: 43.24 KG/M2 | DIASTOLIC BLOOD PRESSURE: 60 MMHG | SYSTOLIC BLOOD PRESSURE: 120 MMHG | TEMPERATURE: 97 F

## 2019-11-18 DIAGNOSIS — Z94.1 H/O HEART TRANSPLANT (HCC): ICD-10-CM

## 2019-11-18 PROCEDURE — 85347 COAGULATION TIME ACTIVATED: CPT

## 2019-11-18 PROCEDURE — 99153 MOD SED SAME PHYS/QHP EA: CPT

## 2019-11-18 PROCEDURE — 4A023N6 MEASUREMENT OF CARDIAC SAMPLING AND PRESSURE, RIGHT HEART, PERCUTANEOUS APPROACH: ICD-10-PCS | Performed by: INTERNAL MEDICINE

## 2019-11-18 PROCEDURE — 85027 COMPLETE CBC AUTOMATED: CPT | Performed by: INTERNAL MEDICINE

## 2019-11-18 PROCEDURE — 92978 ENDOLUMINL IVUS OCT C 1ST: CPT | Performed by: INTERNAL MEDICINE

## 2019-11-18 PROCEDURE — 92979 ENDOLUMINL IVUS OCT C EA: CPT | Performed by: INTERNAL MEDICINE

## 2019-11-18 PROCEDURE — 99152 MOD SED SAME PHYS/QHP 5/>YRS: CPT

## 2019-11-18 PROCEDURE — 80048 BASIC METABOLIC PNL TOTAL CA: CPT | Performed by: INTERNAL MEDICINE

## 2019-11-18 PROCEDURE — B2111ZZ FLUOROSCOPY OF MULTIPLE CORONARY ARTERIES USING LOW OSMOLAR CONTRAST: ICD-10-PCS | Performed by: INTERNAL MEDICINE

## 2019-11-18 PROCEDURE — 36415 COLL VENOUS BLD VENIPUNCTURE: CPT

## 2019-11-18 PROCEDURE — 92978 ENDOLUMINL IVUS OCT C 1ST: CPT

## 2019-11-18 PROCEDURE — 93456 R HRT CORONARY ARTERY ANGIO: CPT

## 2019-11-18 PROCEDURE — 93456 R HRT CORONARY ARTERY ANGIO: CPT | Performed by: INTERNAL MEDICINE

## 2019-11-18 PROCEDURE — 99152 MOD SED SAME PHYS/QHP 5/>YRS: CPT | Performed by: INTERNAL MEDICINE

## 2019-11-18 PROCEDURE — B240ZZ3 ULTRASONOGRAPHY OF SINGLE CORONARY ARTERY, INTRAVASCULAR: ICD-10-PCS | Performed by: INTERNAL MEDICINE

## 2019-11-18 RX ORDER — LIDOCAINE HYDROCHLORIDE 10 MG/ML
INJECTION, SOLUTION EPIDURAL; INFILTRATION; INTRACAUDAL; PERINEURAL
Status: COMPLETED
Start: 2019-11-18 | End: 2019-11-18

## 2019-11-18 RX ORDER — SODIUM CHLORIDE 9 MG/ML
INJECTION, SOLUTION INTRAVENOUS
Status: DISCONTINUED | OUTPATIENT
Start: 2019-11-19 | End: 2019-11-18 | Stop reason: HOSPADM

## 2019-11-18 RX ORDER — BIVALIRUDIN 250 MG/5ML
INJECTION, POWDER, LYOPHILIZED, FOR SOLUTION INTRAVENOUS
Status: COMPLETED
Start: 2019-11-18 | End: 2019-11-18

## 2019-11-18 RX ORDER — MIDAZOLAM HYDROCHLORIDE 1 MG/ML
INJECTION INTRAMUSCULAR; INTRAVENOUS
Status: COMPLETED
Start: 2019-11-18 | End: 2019-11-18

## 2019-11-18 NOTE — PROGRESS NOTES
Pt ambulated in trejo, rt groin remains c/d/i soft. Discharge instructions reviewed w pt. JONNA gordillo and awaiting ride home.

## 2019-11-18 NOTE — H&P
AUDREY BADILLO  : 1965     History reviewed and up to date. No changes to H&P.  The patient is scheduled for RHC, LHC and intracoronary ultrasound of LMCA and LAD artery to assess for allograft vasculopathy in transplanted heart as a part of annual c HENT: Negative for hearing loss. Eyes:   Patient denies significant visual changes   Cardiovascular: Negative for chest pain and claudication. Negative except for what's indicated in HPI   Respiratory: Negative for cough and hemoptysis.    Hematologic MOUTH EVERY 12 HOURS   • TACROlimus (PROGRAF) 1 MG capsule Take 3 mg by mouth. • losartan (COZAAR) 50 MG tablet TAKE 1 TABLET BY MOUTH DAILY 30 tablet 0     No current facility-administered medications for this visit.        Past Medical History:   José Manuel [place]

## 2019-11-19 NOTE — PROCEDURES
[facility]    PATIENT'S NAME: Coco Brown   ATTENDING PHYSICIAN: Abhay Mendosa M.D. OPERATING PHYSICIAN: Jena Maya M.D.    PATIENT ACCOUNT#:   [de-identified]    LOCATION:  [facility] 3 ED  MEDICAL RECORD #:   [id number] pressures and calculated cardiac output using a Rubens equation on room air. We also obtained saturations from the pulmonary artery, right atrium and aorta to screen for any intracardiac shunt on room air.     Selective coronary angiography was performed usi patient was in sinus rhythm with no prognostically significant arrhythmia. RIGHT HEART CATHETERIZATION HEMODYNAMICS:  Right atrial pressure was 3 mmHg and wedge pressure was 7 mmHg.   Pulmonary artery pressure was 26/10/17 mmHg and right ventricle pressu only very small calcified plaque near the ostium of the left anterior descending artery, which was not significant, seen by ultrasound, and left main coronary artery had mild eccentric plaque only in 1 spot. Otherwise, there was no intimal hyperplasia.   3

## 2019-11-26 ENCOUNTER — HOSPITAL ENCOUNTER (OUTPATIENT)
Dept: MAMMOGRAPHY | Age: 54
Discharge: HOME | End: 2019-11-26
Attending: INTERNAL MEDICINE
Payer: COMMERCIAL

## 2019-11-26 ENCOUNTER — HOSPITAL ENCOUNTER (OUTPATIENT)
Dept: BONE DENSITY | Age: 54
Discharge: HOME | End: 2019-11-26
Attending: INTERNAL MEDICINE
Payer: COMMERCIAL

## 2019-11-26 DIAGNOSIS — E78.5 HYPERLIPOPROTEINEMIA: ICD-10-CM

## 2019-11-26 DIAGNOSIS — Z12.39 SCREENING FOR BREAST CANCER: ICD-10-CM

## 2019-11-26 DIAGNOSIS — Z94.9 TRANSPLANT: ICD-10-CM

## 2019-11-26 DIAGNOSIS — I48.91 ATRIAL FIBRILLATION, UNSPECIFIED TYPE (HCC): ICD-10-CM

## 2019-11-26 DIAGNOSIS — E53.8 FOLIC ACID DEFICIENCY: ICD-10-CM

## 2019-11-26 DIAGNOSIS — E03.9 HYPOTHYROIDISM, UNSPECIFIED TYPE: ICD-10-CM

## 2019-11-26 DIAGNOSIS — D64.2: ICD-10-CM

## 2019-11-26 DIAGNOSIS — K76.0 FATTY LIVER: ICD-10-CM

## 2019-11-26 PROCEDURE — 77067 SCR MAMMO BI INCL CAD: CPT | Performed by: INTERNAL MEDICINE

## 2019-11-26 PROCEDURE — 77080 DXA BONE DENSITY AXIAL: CPT | Performed by: INTERNAL MEDICINE

## 2019-11-26 PROCEDURE — 77063 BREAST TOMOSYNTHESIS BI: CPT | Performed by: INTERNAL MEDICINE

## 2019-12-03 ENCOUNTER — TELEPHONE (OUTPATIENT)
Dept: SCHEDULING | Age: 54
End: 2019-12-03

## 2019-12-20 ENCOUNTER — OFFICE VISIT (OUTPATIENT)
Dept: CARDIOLOGY | Age: 54
End: 2019-12-20

## 2019-12-20 VITALS
BODY MASS INDEX: 44.16 KG/M2 | SYSTOLIC BLOOD PRESSURE: 120 MMHG | WEIGHT: 240 LBS | DIASTOLIC BLOOD PRESSURE: 78 MMHG | HEIGHT: 62 IN | HEART RATE: 86 BPM

## 2019-12-20 DIAGNOSIS — E78.5 HYPERLIPOPROTEINEMIA: ICD-10-CM

## 2019-12-20 DIAGNOSIS — I48.91 ATRIAL FIBRILLATION, UNSPECIFIED TYPE (CMD): ICD-10-CM

## 2019-12-20 DIAGNOSIS — K76.0 FATTY LIVER: ICD-10-CM

## 2019-12-20 DIAGNOSIS — E03.9 HYPOTHYROIDISM, UNSPECIFIED TYPE: ICD-10-CM

## 2019-12-20 DIAGNOSIS — E53.8 FOLIC ACID DEFICIENCY: ICD-10-CM

## 2019-12-20 DIAGNOSIS — D64.2 SECONDARY SIDEROBLASTIC ANEMIA DUE TO DRUGS AND TOXINS (CMD): ICD-10-CM

## 2019-12-20 DIAGNOSIS — Z94.9 TRANSPLANT: Primary | ICD-10-CM

## 2019-12-20 PROCEDURE — 99214 OFFICE O/P EST MOD 30 MIN: CPT | Performed by: INTERNAL MEDICINE

## 2019-12-20 RX ORDER — MAGNESIUM OXIDE 400 MG/1
800 TABLET ORAL 2 TIMES DAILY
COMMUNITY
End: 2021-04-02 | Stop reason: SDUPTHER

## 2019-12-20 SDOH — HEALTH STABILITY: MENTAL HEALTH: HOW OFTEN DO YOU HAVE A DRINK CONTAINING ALCOHOL?: NEVER

## 2019-12-20 SDOH — HEALTH STABILITY: PHYSICAL HEALTH: ON AVERAGE, HOW MANY MINUTES DO YOU ENGAGE IN EXERCISE AT THIS LEVEL?: 60 MIN

## 2019-12-20 SDOH — HEALTH STABILITY: PHYSICAL HEALTH: ON AVERAGE, HOW MANY DAYS PER WEEK DO YOU ENGAGE IN MODERATE TO STRENUOUS EXERCISE (LIKE A BRISK WALK)?: 1 DAY

## 2019-12-20 ASSESSMENT — ENCOUNTER SYMPTOMS
ALLERGIC/IMMUNOLOGIC COMMENTS: NO NEW FOOD ALLERGIES
HEMOPTYSIS: 0
CHILLS: 0
BRUISES/BLEEDS EASILY: 0
FEVER: 0
HEMATOCHEZIA: 0
COUGH: 0
WEIGHT GAIN: 0
SUSPICIOUS LESIONS: 0
WEIGHT LOSS: 0

## 2019-12-20 ASSESSMENT — PATIENT HEALTH QUESTIONNAIRE - PHQ9
SUM OF ALL RESPONSES TO PHQ9 QUESTIONS 1 AND 2: 0
1. LITTLE INTEREST OR PLEASURE IN DOING THINGS: NOT AT ALL
SUM OF ALL RESPONSES TO PHQ9 QUESTIONS 1 AND 2: 0
2. FEELING DOWN, DEPRESSED OR HOPELESS: NOT AT ALL

## 2020-01-01 ENCOUNTER — EXTERNAL RECORD (OUTPATIENT)
Dept: HEALTH INFORMATION MANAGEMENT | Facility: OTHER | Age: 55
End: 2020-01-01

## 2020-01-15 ENCOUNTER — TELEPHONE (OUTPATIENT)
Dept: CARDIOLOGY | Age: 55
End: 2020-01-15

## 2020-01-15 RX ORDER — CLINDAMYCIN HYDROCHLORIDE 300 MG/1
CAPSULE ORAL
Qty: 2 CAPSULE | Refills: 3 | Status: SHIPPED | OUTPATIENT
Start: 2020-01-15

## 2020-01-21 ENCOUNTER — TELEPHONE (OUTPATIENT)
Dept: CARDIOLOGY | Age: 55
End: 2020-01-21

## 2020-01-21 RX ORDER — LOSARTAN POTASSIUM 50 MG/1
TABLET ORAL
Qty: 30 TABLET | Refills: 0 | Status: SHIPPED | OUTPATIENT
Start: 2020-01-21 | End: 2020-02-17 | Stop reason: SDUPTHER

## 2020-01-22 RX ORDER — LOSARTAN POTASSIUM 50 MG/1
TABLET ORAL
Qty: 30 TABLET | Refills: 0 | OUTPATIENT
Start: 2020-01-22

## 2020-02-17 ENCOUNTER — TELEPHONE (OUTPATIENT)
Dept: CARDIOLOGY | Age: 55
End: 2020-02-17

## 2020-02-18 RX ORDER — LOSARTAN POTASSIUM 50 MG/1
TABLET ORAL
Qty: 30 TABLET | Refills: 0 | Status: SHIPPED | OUTPATIENT
Start: 2020-02-18 | End: 2020-03-20

## 2020-03-20 RX ORDER — LOSARTAN POTASSIUM 50 MG/1
TABLET ORAL
Qty: 30 TABLET | Refills: 3 | Status: SHIPPED | OUTPATIENT
Start: 2020-03-20 | End: 2020-05-15

## 2020-05-15 RX ORDER — LOSARTAN POTASSIUM 50 MG/1
TABLET ORAL
Qty: 30 TABLET | Refills: 1 | Status: SHIPPED | OUTPATIENT
Start: 2020-05-15 | End: 2020-07-13

## 2020-06-12 ENCOUNTER — E-ADVICE (OUTPATIENT)
Dept: CARDIOLOGY | Age: 55
End: 2020-06-12

## 2020-06-12 ENCOUNTER — TELEPHONE (OUTPATIENT)
Dept: CARDIOLOGY | Age: 55
End: 2020-06-12

## 2020-06-17 ENCOUNTER — LAB ENCOUNTER (OUTPATIENT)
Dept: LAB | Facility: HOSPITAL | Age: 55
End: 2020-06-17
Attending: COUNSELOR
Payer: COMMERCIAL

## 2020-06-17 DIAGNOSIS — I51.9 MYXEDEMA HEART DISEASE: ICD-10-CM

## 2020-06-17 DIAGNOSIS — E53.8 BIOTIN-(PROPIONYL-COA-CARBOXYLASE) LIGASE DEFICIENCY: ICD-10-CM

## 2020-06-17 DIAGNOSIS — D64.2: ICD-10-CM

## 2020-06-17 DIAGNOSIS — K76.0 FATTY METAMORPHOSIS OF LIVER: ICD-10-CM

## 2020-06-17 DIAGNOSIS — E78.5 HYPERLIPEMIA: ICD-10-CM

## 2020-06-17 DIAGNOSIS — I48.91 ATRIAL FIBRILLATION (HCC): Primary | ICD-10-CM

## 2020-06-17 DIAGNOSIS — E03.9 MYXEDEMA HEART DISEASE: ICD-10-CM

## 2020-06-17 PROCEDURE — 80061 LIPID PANEL: CPT

## 2020-06-17 PROCEDURE — 85027 COMPLETE CBC AUTOMATED: CPT

## 2020-06-17 PROCEDURE — 80053 COMPREHEN METABOLIC PANEL: CPT

## 2020-06-17 PROCEDURE — 80197 ASSAY OF TACROLIMUS: CPT

## 2020-06-17 PROCEDURE — 84443 ASSAY THYROID STIM HORMONE: CPT

## 2020-06-17 PROCEDURE — 83880 ASSAY OF NATRIURETIC PEPTIDE: CPT

## 2020-06-17 PROCEDURE — 36415 COLL VENOUS BLD VENIPUNCTURE: CPT

## 2020-06-17 PROCEDURE — 80180 DRUG SCRN QUAN MYCOPHENOLATE: CPT

## 2020-06-18 RX ORDER — LANOLIN ALCOHOL/MO/W.PET/CERES
800 CREAM (GRAM) TOPICAL
COMMUNITY
Start: 2020-02-07 | End: 2021-04-22 | Stop reason: SDUPTHER

## 2020-06-19 ENCOUNTER — OFFICE VISIT (OUTPATIENT)
Dept: CARDIOLOGY | Age: 55
End: 2020-06-19

## 2020-06-19 VITALS
HEIGHT: 62 IN | HEART RATE: 92 BPM | WEIGHT: 235 LBS | DIASTOLIC BLOOD PRESSURE: 78 MMHG | SYSTOLIC BLOOD PRESSURE: 124 MMHG | BODY MASS INDEX: 43.24 KG/M2

## 2020-06-19 DIAGNOSIS — E78.5 HYPERLIPOPROTEINEMIA: ICD-10-CM

## 2020-06-19 DIAGNOSIS — E53.8 FOLIC ACID DEFICIENCY: ICD-10-CM

## 2020-06-19 DIAGNOSIS — I48.91 ATRIAL FIBRILLATION, UNSPECIFIED TYPE (CMD): ICD-10-CM

## 2020-06-19 DIAGNOSIS — E03.9 HYPOTHYROIDISM, UNSPECIFIED TYPE: ICD-10-CM

## 2020-06-19 DIAGNOSIS — T86.298 OTHER COMPLICATION OF HEART TRANSPLANT (CMD): Primary | ICD-10-CM

## 2020-06-19 DIAGNOSIS — K76.0 FATTY LIVER: ICD-10-CM

## 2020-06-19 DIAGNOSIS — D64.2 SECONDARY SIDEROBLASTIC ANEMIA DUE TO DRUGS AND TOXINS (CMD): ICD-10-CM

## 2020-06-19 DIAGNOSIS — Z94.9 TRANSPLANT: ICD-10-CM

## 2020-06-19 PROCEDURE — 99214 OFFICE O/P EST MOD 30 MIN: CPT | Performed by: INTERNAL MEDICINE

## 2020-06-19 SDOH — HEALTH STABILITY: MENTAL HEALTH: HOW OFTEN DO YOU HAVE A DRINK CONTAINING ALCOHOL?: NEVER

## 2020-06-19 ASSESSMENT — ENCOUNTER SYMPTOMS
DIARRHEA: 1
HEMATOCHEZIA: 0
ALLERGIC/IMMUNOLOGIC COMMENTS: NO NEW FOOD ALLERGIES
SUSPICIOUS LESIONS: 0
COUGH: 0
FEVER: 0
WEIGHT LOSS: 1
WEIGHT GAIN: 0
HEMOPTYSIS: 0
BRUISES/BLEEDS EASILY: 0
CHILLS: 0

## 2020-06-19 ASSESSMENT — PATIENT HEALTH QUESTIONNAIRE - PHQ9
1. LITTLE INTEREST OR PLEASURE IN DOING THINGS: NOT AT ALL
CLINICAL INTERPRETATION OF PHQ2 SCORE: NO FURTHER SCREENING NEEDED
SUM OF ALL RESPONSES TO PHQ9 QUESTIONS 1 AND 2: 0
2. FEELING DOWN, DEPRESSED OR HOPELESS: NOT AT ALL
CLINICAL INTERPRETATION OF PHQ9 SCORE: NO FURTHER SCREENING NEEDED
SUM OF ALL RESPONSES TO PHQ9 QUESTIONS 1 AND 2: 0

## 2020-06-22 ENCOUNTER — TELEPHONE (OUTPATIENT)
Dept: CARDIOLOGY | Age: 55
End: 2020-06-22

## 2020-06-22 DIAGNOSIS — Z94.9 TRANSPLANT: Primary | ICD-10-CM

## 2020-07-11 ENCOUNTER — LAB ENCOUNTER (OUTPATIENT)
Dept: LAB | Facility: HOSPITAL | Age: 55
End: 2020-07-11
Attending: INTERNAL MEDICINE
Payer: COMMERCIAL

## 2020-07-11 DIAGNOSIS — Z94.9 TRANSPLANT: Primary | ICD-10-CM

## 2020-07-11 PROCEDURE — 80197 ASSAY OF TACROLIMUS: CPT

## 2020-07-13 RX ORDER — LOSARTAN POTASSIUM 50 MG/1
TABLET ORAL
Qty: 30 TABLET | Refills: 5 | Status: SHIPPED | OUTPATIENT
Start: 2020-07-13 | End: 2021-01-13

## 2020-07-14 LAB — TACROLIMUS: 11.5 NG/ML

## 2020-07-17 ENCOUNTER — TELEPHONE (OUTPATIENT)
Dept: CARDIOLOGY | Age: 55
End: 2020-07-17

## 2020-07-17 ENCOUNTER — E-ADVICE (OUTPATIENT)
Dept: CARDIOLOGY | Age: 55
End: 2020-07-17

## 2020-07-17 RX ORDER — TACROLIMUS 1 MG/1
2 CAPSULE ORAL 2 TIMES DAILY
COMMUNITY
Start: 2020-07-16

## 2020-09-09 PROBLEM — Z78.0 OSTEOPENIA AFTER MENOPAUSE: Status: ACTIVE | Noted: 2020-09-09

## 2020-09-09 PROBLEM — R79.89 ELEVATED BRAIN NATRIURETIC PEPTIDE (BNP) LEVEL: Status: ACTIVE | Noted: 2020-09-09

## 2020-09-09 PROBLEM — M85.80 OSTEOPENIA AFTER MENOPAUSE: Status: ACTIVE | Noted: 2020-09-09

## 2020-09-09 PROBLEM — N18.30 CKD (CHRONIC KIDNEY DISEASE) STAGE 3, GFR 30-59 ML/MIN (HCC): Status: ACTIVE | Noted: 2020-09-09

## 2020-09-16 ENCOUNTER — LAB ENCOUNTER (OUTPATIENT)
Dept: LAB | Facility: HOSPITAL | Age: 55
End: 2020-09-16
Attending: INTERNAL MEDICINE
Payer: COMMERCIAL

## 2020-09-16 ENCOUNTER — HOSPITAL ENCOUNTER (OUTPATIENT)
Dept: CV DIAGNOSTICS | Facility: HOSPITAL | Age: 55
Discharge: HOME | End: 2020-09-16
Attending: INTERNAL MEDICINE
Payer: COMMERCIAL

## 2020-09-16 DIAGNOSIS — K76.0 FATTY LIVER: ICD-10-CM

## 2020-09-16 DIAGNOSIS — D64.2: ICD-10-CM

## 2020-09-16 DIAGNOSIS — E78.5 HYPERLIPEMIA: ICD-10-CM

## 2020-09-16 DIAGNOSIS — E03.9 MYXEDEMA HEART DISEASE: ICD-10-CM

## 2020-09-16 DIAGNOSIS — E53.8 FOLIC ACID DEFICIENCY: ICD-10-CM

## 2020-09-16 DIAGNOSIS — E53.8 BIOTIN-(PROPIONYL-COA-CARBOXYLASE) LIGASE DEFICIENCY: ICD-10-CM

## 2020-09-16 DIAGNOSIS — T86.298 OTHER COMPLICATION OF HEART TRANSPLANT (HCC): ICD-10-CM

## 2020-09-16 DIAGNOSIS — I48.91 ATRIAL FIBRILLATION (HCC): ICD-10-CM

## 2020-09-16 DIAGNOSIS — K76.0 FATTY METAMORPHOSIS OF LIVER: ICD-10-CM

## 2020-09-16 DIAGNOSIS — I51.9 MYXEDEMA HEART DISEASE: ICD-10-CM

## 2020-09-16 DIAGNOSIS — Z94.9 UNSPECIFIED ORGAN OR TISSUE REPLACED BY TRANSPLANT: ICD-10-CM

## 2020-09-16 DIAGNOSIS — I48.91 ATRIAL FIBRILLATION, UNSPECIFIED TYPE (HCC): ICD-10-CM

## 2020-09-16 DIAGNOSIS — E03.9 HYPOTHYROIDISM, UNSPECIFIED TYPE: ICD-10-CM

## 2020-09-16 DIAGNOSIS — T86.298 OTHER COMPLICATION OF HEART TRANSPLANT (HCC): Primary | ICD-10-CM

## 2020-09-16 DIAGNOSIS — E78.5 HYPERLIPOPROTEINEMIA: ICD-10-CM

## 2020-09-16 DIAGNOSIS — Z94.9 TRANSPLANT: ICD-10-CM

## 2020-09-16 LAB
ALBUMIN SERPL-MCNC: 3.1 G/DL (ref 3.4–5)
ALBUMIN/GLOB SERPL: 0.8 {RATIO} (ref 1–2)
ALP LIVER SERPL-CCNC: 160 U/L (ref 41–108)
ALT SERPL-CCNC: 30 U/L (ref 13–56)
ANION GAP SERPL CALC-SCNC: 4 MMOL/L (ref 0–18)
AST SERPL-CCNC: 22 U/L (ref 15–37)
BASOPHILS # BLD AUTO: 0.03 X10(3) UL (ref 0–0.2)
BASOPHILS NFR BLD AUTO: 0.4 %
BILIRUB SERPL-MCNC: 0.5 MG/DL (ref 0.1–2)
BUN BLD-MCNC: 35 MG/DL (ref 7–18)
BUN/CREAT SERPL: 21.1 (ref 10–20)
CALCIUM BLD-MCNC: 8.9 MG/DL (ref 8.5–10.1)
CHLORIDE SERPL-SCNC: 109 MMOL/L (ref 98–112)
CHOLEST SMN-MCNC: 150 MG/DL (ref ?–200)
CO2 SERPL-SCNC: 26 MMOL/L (ref 21–32)
CREAT BLD-MCNC: 1.66 MG/DL (ref 0.55–1.02)
DEPRECATED RDW RBC AUTO: 40.6 FL (ref 35.1–46.3)
EOSINOPHIL # BLD AUTO: 0.32 X10(3) UL (ref 0–0.7)
EOSINOPHIL NFR BLD AUTO: 4.5 %
ERYTHROCYTE [DISTWIDTH] IN BLOOD BY AUTOMATED COUNT: 12.4 % (ref 11–15)
EST. AVERAGE GLUCOSE BLD GHB EST-MCNC: 111 MG/DL (ref 68–126)
GLOBULIN PLAS-MCNC: 4.1 G/DL (ref 2.8–4.4)
GLUCOSE BLD-MCNC: 92 MG/DL (ref 70–99)
HBA1C MFR BLD HPLC: 5.5 % (ref ?–5.7)
HCT VFR BLD AUTO: 35.6 % (ref 35–48)
HDLC SERPL-MCNC: 66 MG/DL (ref 40–59)
HGB BLD-MCNC: 11.3 G/DL (ref 12–16)
IMM GRANULOCYTES # BLD AUTO: 0.04 X10(3) UL (ref 0–1)
IMM GRANULOCYTES NFR BLD: 0.6 %
LDLC SERPL CALC-MCNC: 67 MG/DL (ref ?–100)
LYMPHOCYTES # BLD AUTO: 1.02 X10(3) UL (ref 1–4)
LYMPHOCYTES NFR BLD AUTO: 14.2 %
M PROTEIN MFR SERPL ELPH: 7.2 G/DL (ref 6.4–8.2)
MCH RBC QN AUTO: 28.5 PG (ref 26–34)
MCHC RBC AUTO-ENTMCNC: 31.7 G/DL (ref 31–37)
MCV RBC AUTO: 89.7 FL (ref 80–100)
MONOCYTES # BLD AUTO: 0.64 X10(3) UL (ref 0.1–1)
MONOCYTES NFR BLD AUTO: 8.9 %
NEUTROPHILS # BLD AUTO: 5.14 X10 (3) UL (ref 1.5–7.7)
NEUTROPHILS # BLD AUTO: 5.14 X10(3) UL (ref 1.5–7.7)
NEUTROPHILS NFR BLD AUTO: 71.4 %
NONHDLC SERPL-MCNC: 84 MG/DL (ref ?–130)
NT-PROBNP SERPL-MCNC: 1121 PG/ML (ref ?–125)
OSMOLALITY SERPL CALC.SUM OF ELEC: 296 MOSM/KG (ref 275–295)
PATIENT FASTING Y/N/NP: YES
PATIENT FASTING Y/N/NP: YES
PLATELET # BLD AUTO: 194 10(3)UL (ref 150–450)
POTASSIUM SERPL-SCNC: 4.9 MMOL/L (ref 3.5–5.1)
RBC # BLD AUTO: 3.97 X10(6)UL (ref 3.8–5.3)
SODIUM SERPL-SCNC: 139 MMOL/L (ref 136–145)
TRIGL SERPL-MCNC: 83 MG/DL (ref 30–149)
TSI SER-ACNC: 3.47 MIU/ML (ref 0.36–3.74)
VLDLC SERPL CALC-MCNC: 17 MG/DL (ref 0–30)
WBC # BLD AUTO: 7.2 X10(3) UL (ref 4–11)

## 2020-09-16 PROCEDURE — 36415 COLL VENOUS BLD VENIPUNCTURE: CPT

## 2020-09-16 PROCEDURE — 80180 DRUG SCRN QUAN MYCOPHENOLATE: CPT

## 2020-09-16 PROCEDURE — 93306 TTE W/DOPPLER COMPLETE: CPT | Performed by: INTERNAL MEDICINE

## 2020-09-16 PROCEDURE — 84443 ASSAY THYROID STIM HORMONE: CPT

## 2020-09-16 PROCEDURE — 80053 COMPREHEN METABOLIC PANEL: CPT

## 2020-09-16 PROCEDURE — 83036 HEMOGLOBIN GLYCOSYLATED A1C: CPT

## 2020-09-16 PROCEDURE — 85025 COMPLETE CBC W/AUTO DIFF WBC: CPT

## 2020-09-16 PROCEDURE — 80061 LIPID PANEL: CPT

## 2020-09-16 PROCEDURE — 83880 ASSAY OF NATRIURETIC PEPTIDE: CPT

## 2020-09-16 PROCEDURE — 80197 ASSAY OF TACROLIMUS: CPT

## 2020-09-18 LAB — TACROLIMUS: 9 NG/ML

## 2020-09-20 LAB
MYCOPHENOLIC ACID GLUCURONIDE: 62.6 UG/ML
MYCOPHENOLIC ACID: 2 UG/ML

## 2020-09-25 ENCOUNTER — OFFICE VISIT (OUTPATIENT)
Dept: CARDIOLOGY | Age: 55
End: 2020-09-25

## 2020-09-25 VITALS
SYSTOLIC BLOOD PRESSURE: 104 MMHG | WEIGHT: 237.8 LBS | HEIGHT: 62 IN | DIASTOLIC BLOOD PRESSURE: 76 MMHG | HEART RATE: 84 BPM | BODY MASS INDEX: 43.76 KG/M2

## 2020-09-25 DIAGNOSIS — Z94.1 HEART TRANSPLANT, ORTHOTOPIC, STATUS (CMD): Primary | ICD-10-CM

## 2020-09-25 DIAGNOSIS — Z94.9 TRANSPLANT: ICD-10-CM

## 2020-09-25 DIAGNOSIS — K76.0 FATTY LIVER: ICD-10-CM

## 2020-09-25 DIAGNOSIS — E53.8 FOLIC ACID DEFICIENCY: ICD-10-CM

## 2020-09-25 DIAGNOSIS — E03.9 HYPOTHYROIDISM, UNSPECIFIED TYPE: ICD-10-CM

## 2020-09-25 DIAGNOSIS — E78.5 HYPERLIPOPROTEINEMIA: ICD-10-CM

## 2020-09-25 DIAGNOSIS — I48.91 ATRIAL FIBRILLATION, UNSPECIFIED TYPE (CMD): ICD-10-CM

## 2020-09-25 DIAGNOSIS — D64.2 SECONDARY SIDEROBLASTIC ANEMIA DUE TO DRUGS AND TOXINS (CMD): ICD-10-CM

## 2020-09-25 PROCEDURE — 99214 OFFICE O/P EST MOD 30 MIN: CPT | Performed by: INTERNAL MEDICINE

## 2020-09-25 SDOH — HEALTH STABILITY: MENTAL HEALTH: HOW OFTEN DO YOU HAVE A DRINK CONTAINING ALCOHOL?: NEVER

## 2020-09-25 ASSESSMENT — ENCOUNTER SYMPTOMS
WEIGHT GAIN: 0
ALLERGIC/IMMUNOLOGIC COMMENTS: NO NEW FOOD ALLERGIES
BRUISES/BLEEDS EASILY: 0
SUSPICIOUS LESIONS: 0
CHILLS: 0
FEVER: 0
HEMOPTYSIS: 0
HEMATOCHEZIA: 0
COUGH: 0
WEIGHT LOSS: 0

## 2020-09-25 ASSESSMENT — PATIENT HEALTH QUESTIONNAIRE - PHQ9
SUM OF ALL RESPONSES TO PHQ9 QUESTIONS 1 AND 2: 0
CLINICAL INTERPRETATION OF PHQ9 SCORE: NO FURTHER SCREENING NEEDED
2. FEELING DOWN, DEPRESSED OR HOPELESS: NOT AT ALL
SUM OF ALL RESPONSES TO PHQ9 QUESTIONS 1 AND 2: 0
1. LITTLE INTEREST OR PLEASURE IN DOING THINGS: NOT AT ALL
CLINICAL INTERPRETATION OF PHQ2 SCORE: NO FURTHER SCREENING NEEDED

## 2020-10-07 ENCOUNTER — TELEPHONE (OUTPATIENT)
Dept: CARDIOLOGY | Age: 55
End: 2020-10-07

## 2020-10-07 DIAGNOSIS — I48.91 ATRIAL FIBRILLATION, UNSPECIFIED TYPE (CMD): ICD-10-CM

## 2020-10-07 DIAGNOSIS — E03.9 HYPOTHYROIDISM, UNSPECIFIED TYPE: ICD-10-CM

## 2020-10-07 DIAGNOSIS — Z94.1 HEART TRANSPLANT RECIPIENT (CMD): Primary | ICD-10-CM

## 2020-10-07 DIAGNOSIS — Z94.9 TRANSPLANT: ICD-10-CM

## 2020-10-07 DIAGNOSIS — E78.5 HYPERLIPOPROTEINEMIA: ICD-10-CM

## 2020-10-07 DIAGNOSIS — K76.0 FATTY LIVER: ICD-10-CM

## 2020-10-07 DIAGNOSIS — Z92.25 PERSONAL HISTORY OF IMMUNOSUPPRESSIVE THERAPY: ICD-10-CM

## 2020-10-07 DIAGNOSIS — Z13.9 SCREENING FOR UNSPECIFIED CONDITION: ICD-10-CM

## 2020-10-07 DIAGNOSIS — D64.2 SECONDARY SIDEROBLASTIC ANEMIA DUE TO DRUGS AND TOXINS (CMD): ICD-10-CM

## 2020-10-07 DIAGNOSIS — Z12.39 OTHER SCREENING BREAST EXAMINATION: ICD-10-CM

## 2020-10-07 DIAGNOSIS — E53.8 FOLIC ACID DEFICIENCY: ICD-10-CM

## 2020-10-07 DIAGNOSIS — Z48.21 ENCOUNTER FOR AFTERCARE FOLLOWING HEART TRANSPLANT (CMD): ICD-10-CM

## 2020-10-07 DIAGNOSIS — Z12.31 OTHER SCREENING MAMMOGRAM: ICD-10-CM

## 2020-11-06 ENCOUNTER — E-ADVICE (OUTPATIENT)
Dept: CARDIOLOGY | Age: 55
End: 2020-11-06

## 2020-11-06 DIAGNOSIS — D64.2 SECONDARY SIDEROBLASTIC ANEMIA DUE TO DRUGS AND TOXINS (CMD): ICD-10-CM

## 2020-11-06 DIAGNOSIS — Z94.9 TRANSPLANT: Primary | ICD-10-CM

## 2020-11-11 ENCOUNTER — TELEPHONE (OUTPATIENT)
Dept: CARDIOLOGY | Age: 55
End: 2020-11-11

## 2020-11-30 ENCOUNTER — LAB ENCOUNTER (OUTPATIENT)
Dept: LAB | Facility: HOSPITAL | Age: 55
End: 2020-11-30
Attending: INTERNAL MEDICINE
Payer: COMMERCIAL

## 2020-11-30 DIAGNOSIS — Z94.9 ORGAN TRANSPLANT: Primary | ICD-10-CM

## 2020-11-30 DIAGNOSIS — D64.2: ICD-10-CM

## 2020-12-01 ENCOUNTER — TELEPHONE (OUTPATIENT)
Dept: CARDIOLOGY | Age: 55
End: 2020-12-01

## 2020-12-01 DIAGNOSIS — Z94.9 TRANSPLANT: Primary | ICD-10-CM

## 2020-12-01 DIAGNOSIS — Z79.899 MEDICATION MANAGEMENT: ICD-10-CM

## 2020-12-03 ENCOUNTER — TELEPHONE (OUTPATIENT)
Dept: CARDIOLOGY | Age: 55
End: 2020-12-03

## 2020-12-03 NOTE — HISTORICAL OFFICE NOTE
Progress Notes  - documented in this encounter  Ernie Francois MD - 09/25/2020 2:30 PM CDT  Formatting of this note might be different from the original.  Advanced Heart Failure Progress Note    Subjective:   The patient presents today stating that s wheezes, rales, rhonchi or dullness. Normal excursions and effort. Abdomen: Soft, non-tender. Extreme central obesity. Extremities: Without clubbing, cyanosis or edema. Peripheral pulses are 2+. Neurologic: Alert and oriented, normal affect.   Skin: Warm Family History   Problem Relation Age of Onset   • Coronary Artery Disease Neg Hx   • Aneurysm Neg Hx       Social History     Socioeconomic History   • Marital status: Single   Spouse name: Not on file   • Number of children: Not on file   • Years o therapeutic range. The patient's anniversary is December 15 and therefore we will arrange for all the testing associated with the annual evaluation. I will see the patient after the evaluation is been completed. Joanie Null M.D., F.A.C.C., F.A.

## 2020-12-04 ENCOUNTER — HOSPITAL ENCOUNTER (OUTPATIENT)
Dept: GENERAL RADIOLOGY | Age: 55
Discharge: HOME | End: 2020-12-04
Attending: INTERNAL MEDICINE
Payer: COMMERCIAL

## 2020-12-04 ENCOUNTER — HOSPITAL ENCOUNTER (OUTPATIENT)
Dept: BONE DENSITY | Age: 55
Discharge: HOME | End: 2020-12-04
Attending: INTERNAL MEDICINE
Payer: COMMERCIAL

## 2020-12-04 ENCOUNTER — HOSPITAL ENCOUNTER (OUTPATIENT)
Dept: MAMMOGRAPHY | Age: 55
Discharge: HOME | End: 2020-12-04
Attending: INTERNAL MEDICINE
Payer: COMMERCIAL

## 2020-12-04 ENCOUNTER — APPOINTMENT (OUTPATIENT)
Dept: LAB | Facility: HOSPITAL | Age: 55
End: 2020-12-04
Attending: INTERNAL MEDICINE
Payer: COMMERCIAL

## 2020-12-04 DIAGNOSIS — Z94.1 HEART TRANSPLANT RECIPIENT (HCC): ICD-10-CM

## 2020-12-04 DIAGNOSIS — E78.5 HYPERLIPOPROTEINEMIA: ICD-10-CM

## 2020-12-04 DIAGNOSIS — I48.91 ATRIAL FIBRILLATION, UNSPECIFIED TYPE (HCC): ICD-10-CM

## 2020-12-04 DIAGNOSIS — Z92.25 H/O IMMUNOSUPPRESSIVE THERAPY: ICD-10-CM

## 2020-12-04 DIAGNOSIS — K76.0 FATTY LIVER: ICD-10-CM

## 2020-12-04 DIAGNOSIS — E53.8 FOLIC ACID DEFICIENCY: ICD-10-CM

## 2020-12-04 DIAGNOSIS — D64.2: ICD-10-CM

## 2020-12-04 DIAGNOSIS — Z94.1 HEART TRANSPLANT, ORTHOTOPIC, STATUS (HCC): ICD-10-CM

## 2020-12-04 DIAGNOSIS — Z12.31 ENCOUNTER FOR SCREENING MAMMOGRAM FOR MALIGNANT NEOPLASM OF BREAST: ICD-10-CM

## 2020-12-04 DIAGNOSIS — D64.1 SECONDARY SIDEROBLASTIC ANEMIA DUE TO DISEASE (HCC): ICD-10-CM

## 2020-12-04 DIAGNOSIS — E03.9 HYPOTHYROIDISM, UNSPECIFIED TYPE: ICD-10-CM

## 2020-12-04 DIAGNOSIS — Z94.9 TRANSPLANT: ICD-10-CM

## 2020-12-04 PROCEDURE — 77067 SCR MAMMO BI INCL CAD: CPT | Performed by: INTERNAL MEDICINE

## 2020-12-04 PROCEDURE — 71046 X-RAY EXAM CHEST 2 VIEWS: CPT | Performed by: INTERNAL MEDICINE

## 2020-12-04 PROCEDURE — 77063 BREAST TOMOSYNTHESIS BI: CPT | Performed by: INTERNAL MEDICINE

## 2020-12-04 PROCEDURE — 77080 DXA BONE DENSITY AXIAL: CPT | Performed by: INTERNAL MEDICINE

## 2020-12-07 ENCOUNTER — HOSPITAL ENCOUNTER (OUTPATIENT)
Dept: INTERVENTIONAL RADIOLOGY/VASCULAR | Facility: HOSPITAL | Age: 55
Discharge: HOME | End: 2020-12-07
Attending: INTERNAL MEDICINE | Admitting: INTERNAL MEDICINE
Payer: COMMERCIAL

## 2020-12-07 ENCOUNTER — TELEPHONE (OUTPATIENT)
Dept: CARDIOLOGY | Age: 55
End: 2020-12-07

## 2020-12-07 ENCOUNTER — E-ADVICE (OUTPATIENT)
Dept: CARDIOLOGY | Age: 55
End: 2020-12-07

## 2020-12-07 VITALS
BODY MASS INDEX: 42.33 KG/M2 | WEIGHT: 230 LBS | RESPIRATION RATE: 20 BRPM | HEIGHT: 62 IN | TEMPERATURE: 97 F | OXYGEN SATURATION: 100 % | SYSTOLIC BLOOD PRESSURE: 118 MMHG | HEART RATE: 82 BPM | DIASTOLIC BLOOD PRESSURE: 56 MMHG

## 2020-12-07 DIAGNOSIS — Z94.1 HEART TRANSPLANT RECIPIENT (HCC): ICD-10-CM

## 2020-12-07 PROCEDURE — 93005 ELECTROCARDIOGRAM TRACING: CPT

## 2020-12-07 PROCEDURE — 36415 COLL VENOUS BLD VENIPUNCTURE: CPT

## 2020-12-07 PROCEDURE — B241ZZ3 ULTRASONOGRAPHY OF MULTIPLE CORONARY ARTERIES, INTRAVASCULAR: ICD-10-PCS | Performed by: INTERNAL MEDICINE

## 2020-12-07 PROCEDURE — 96360 HYDRATION IV INFUSION INIT: CPT

## 2020-12-07 PROCEDURE — 93456 R HRT CORONARY ARTERY ANGIO: CPT | Performed by: INTERNAL MEDICINE

## 2020-12-07 PROCEDURE — 4A023N6 MEASUREMENT OF CARDIAC SAMPLING AND PRESSURE, RIGHT HEART, PERCUTANEOUS APPROACH: ICD-10-PCS | Performed by: INTERNAL MEDICINE

## 2020-12-07 PROCEDURE — 93010 ELECTROCARDIOGRAM REPORT: CPT | Performed by: INTERNAL MEDICINE

## 2020-12-07 PROCEDURE — B211YZZ FLUOROSCOPY OF MULTIPLE CORONARY ARTERIES USING OTHER CONTRAST: ICD-10-PCS | Performed by: INTERNAL MEDICINE

## 2020-12-07 PROCEDURE — 92978 ENDOLUMINL IVUS OCT C 1ST: CPT | Performed by: INTERNAL MEDICINE

## 2020-12-07 PROCEDURE — 92979 ENDOLUMINL IVUS OCT C EA: CPT | Performed by: INTERNAL MEDICINE

## 2020-12-07 PROCEDURE — 92979 ENDOLUMINL IVUS OCT C EA: CPT

## 2020-12-07 PROCEDURE — 99152 MOD SED SAME PHYS/QHP 5/>YRS: CPT

## 2020-12-07 PROCEDURE — 99153 MOD SED SAME PHYS/QHP EA: CPT

## 2020-12-07 PROCEDURE — 99152 MOD SED SAME PHYS/QHP 5/>YRS: CPT | Performed by: INTERNAL MEDICINE

## 2020-12-07 PROCEDURE — 92978 ENDOLUMINL IVUS OCT C 1ST: CPT

## 2020-12-07 PROCEDURE — 84132 ASSAY OF SERUM POTASSIUM: CPT | Performed by: INTERNAL MEDICINE

## 2020-12-07 PROCEDURE — 80197 ASSAY OF TACROLIMUS: CPT | Performed by: INTERNAL MEDICINE

## 2020-12-07 PROCEDURE — 85347 COAGULATION TIME ACTIVATED: CPT

## 2020-12-07 PROCEDURE — 93456 R HRT CORONARY ARTERY ANGIO: CPT

## 2020-12-07 RX ORDER — BIVALIRUDIN 250 MG/5ML
INJECTION, POWDER, LYOPHILIZED, FOR SOLUTION INTRAVENOUS
Status: COMPLETED
Start: 2020-12-07 | End: 2020-12-07

## 2020-12-07 RX ORDER — LIDOCAINE HYDROCHLORIDE 10 MG/ML
INJECTION, SOLUTION EPIDURAL; INFILTRATION; INTRACAUDAL; PERINEURAL
Status: COMPLETED
Start: 2020-12-07 | End: 2020-12-07

## 2020-12-07 RX ORDER — SODIUM CHLORIDE 9 MG/ML
INJECTION, SOLUTION INTRAVENOUS
Status: DISCONTINUED | OUTPATIENT
Start: 2020-12-08 | End: 2020-12-07 | Stop reason: HOSPADM

## 2020-12-07 RX ORDER — MIDAZOLAM HYDROCHLORIDE 1 MG/ML
INJECTION INTRAMUSCULAR; INTRAVENOUS
Status: COMPLETED
Start: 2020-12-07 | End: 2020-12-07

## 2020-12-07 NOTE — PROGRESS NOTES
Pt A&O x 4 and ready to go home. VSS on RA. Pt's right groin dressing c/d/i with no signs of bleeding or hematoma. Pt has no complaints. After ambulation, pt's right groin remains WNL. Discharge instructions reviewed with pt. All questions answered.  IV and

## 2020-12-07 NOTE — PROCEDURES
[facility]    PATIENT'S NAME: Coco Pereira   ATTENDING PHYSICIAN: Faby Garcia M.D. OPERATING PHYSICIAN: Clarice Gao M.D.    PATIENT ACCOUNT#:   [de-identified]    LOCATION:  [address] [place] [address] Dr [facility] ED  MEDICAL RECORD #:   [id number] micropuncture kit to access the right femoral artery for safety reasons with her morbid obesity and BMI of 42. Right heart catheterization was performed.   Using pulmonary capillary wedge pressure catheter, we measured right heart pressures and calculate was secured to the skin, to be pulled out later in holding area since ACT was 264 seconds. RIGHT HEART CATHETERIZATION HEMODYNAMICS:  Rubens cardiac output 6.7 L/min with cardiac index of 3.3 L/min/m2.     The pulmonary vascular resistance was 1.2 Iv[place] arteries angiographically with dominant right coronary arterial system.   2.   Only minimal and not even circumferential intimal hyperplasia in proximal LAD and very small calcified plaque near the ostium of LAD artery extending to the distal left main, non

## 2020-12-07 NOTE — PROGRESS NOTES
History and physical examination    AUDREY BADILLO  : 1965    Referring physician: Dr. Susie Billingsley    MR: [facility] [id number]  CSN number: [id number]     History reviewed and up to date.  No changes to H&P. The patient is scheduled for RHC, LHC and int what's indicated in HPI   Respiratory: Negative for cough and hemoptysis. Hematologic/Lymphatic: Does not bruise/bleed easily. Skin: Negative for rash and suspicious lesions. Musculoskeletal: Negative for arthritis.    Gastrointestinal: Negative for h levothyroxine (SYNTHROID, LEVOTHROID) 88 MCG tablet TAKE 1 TABLET(88 MCG) BY MOUTH ONCE DAILY   • Multiple Vitamin (TAB-A-OBDULIO/BETA CAROTENE) Tab Take 1 tablet by mouth daily.    • mycophenolate (CELLCEPT) 500 MG tablet TAKE 1 TABLET BY MOUTH EVERY 12 HOURS Active member of club or organization: Not on file   Attends meetings of clubs or organizations: Not on file   Relationship status: Not on file   • Intimate partner violence   Fear of current or ex partner: Not on file   Emotionally abused: Not on file

## 2020-12-11 ENCOUNTER — HOSPITAL ENCOUNTER (OUTPATIENT)
Dept: INTERVENTIONAL RADIOLOGY/VASCULAR | Facility: HOSPITAL | Age: 55
Discharge: HOME | End: 2020-12-11
Attending: INTERNAL MEDICINE
Payer: COMMERCIAL

## 2020-12-11 DIAGNOSIS — Z94.1 HEART TRANSPLANT RECIPIENT (HCC): Primary | ICD-10-CM

## 2020-12-15 ENCOUNTER — TELEPHONE (OUTPATIENT)
Dept: CARDIOLOGY | Age: 55
End: 2020-12-15

## 2020-12-17 ENCOUNTER — E-ADVICE (OUTPATIENT)
Dept: CARDIOLOGY | Age: 55
End: 2020-12-17

## 2021-01-13 RX ORDER — LOSARTAN POTASSIUM 50 MG/1
TABLET ORAL
Qty: 30 TABLET | Refills: 5 | Status: SHIPPED | OUTPATIENT
Start: 2021-01-13

## 2021-01-22 ENCOUNTER — OFFICE VISIT (OUTPATIENT)
Dept: CARDIOLOGY | Age: 56
End: 2021-01-22

## 2021-01-22 VITALS
WEIGHT: 233 LBS | BODY MASS INDEX: 42.88 KG/M2 | HEART RATE: 92 BPM | DIASTOLIC BLOOD PRESSURE: 82 MMHG | SYSTOLIC BLOOD PRESSURE: 122 MMHG | HEIGHT: 62 IN

## 2021-01-22 DIAGNOSIS — Z94.9 TRANSPLANT: Primary | ICD-10-CM

## 2021-01-22 DIAGNOSIS — E53.8 FOLIC ACID DEFICIENCY: ICD-10-CM

## 2021-01-22 DIAGNOSIS — D64.2 SECONDARY SIDEROBLASTIC ANEMIA DUE TO DRUGS AND TOXINS (CMD): ICD-10-CM

## 2021-01-22 DIAGNOSIS — K76.0 FATTY LIVER: ICD-10-CM

## 2021-01-22 DIAGNOSIS — E78.5 HYPERLIPOPROTEINEMIA: ICD-10-CM

## 2021-01-22 DIAGNOSIS — I48.91 ATRIAL FIBRILLATION, UNSPECIFIED TYPE (CMD): ICD-10-CM

## 2021-01-22 DIAGNOSIS — E03.9 HYPOTHYROIDISM, UNSPECIFIED TYPE: ICD-10-CM

## 2021-01-22 DIAGNOSIS — E55.9 VITAMIN D DEFICIENCY: ICD-10-CM

## 2021-01-22 PROCEDURE — 99214 OFFICE O/P EST MOD 30 MIN: CPT | Performed by: INTERNAL MEDICINE

## 2021-01-22 SDOH — HEALTH STABILITY: MENTAL HEALTH: HOW OFTEN DO YOU HAVE A DRINK CONTAINING ALCOHOL?: NEVER

## 2021-01-22 ASSESSMENT — ENCOUNTER SYMPTOMS
WEIGHT GAIN: 0
ALLERGIC/IMMUNOLOGIC COMMENTS: NO NEW FOOD ALLERGIES
HEMOPTYSIS: 0
BRUISES/BLEEDS EASILY: 0
SUSPICIOUS LESIONS: 0
HEMATOCHEZIA: 0
COUGH: 0
FEVER: 0
WEIGHT LOSS: 0
CHILLS: 0

## 2021-01-22 ASSESSMENT — PATIENT HEALTH QUESTIONNAIRE - PHQ9
CLINICAL INTERPRETATION OF PHQ9 SCORE: NO FURTHER SCREENING NEEDED
SUM OF ALL RESPONSES TO PHQ9 QUESTIONS 1 AND 2: 0
2. FEELING DOWN, DEPRESSED OR HOPELESS: NOT AT ALL
CLINICAL INTERPRETATION OF PHQ2 SCORE: NO FURTHER SCREENING NEEDED
1. LITTLE INTEREST OR PLEASURE IN DOING THINGS: NOT AT ALL
SUM OF ALL RESPONSES TO PHQ9 QUESTIONS 1 AND 2: 0

## 2021-02-11 DIAGNOSIS — Z23 NEED FOR VACCINATION: ICD-10-CM

## 2021-02-13 ENCOUNTER — IMMUNIZATION (OUTPATIENT)
Dept: LAB | Age: 56
End: 2021-02-13
Attending: HOSPITALIST
Payer: COMMERCIAL

## 2021-02-13 DIAGNOSIS — Z23 NEED FOR VACCINATION: Primary | ICD-10-CM

## 2021-02-13 PROCEDURE — 0001A SARSCOV2 VAC 30MCG/0.3ML IM: CPT

## 2021-03-06 ENCOUNTER — IMMUNIZATION (OUTPATIENT)
Dept: LAB | Age: 56
End: 2021-03-06
Attending: HOSPITALIST
Payer: COMMERCIAL

## 2021-03-06 DIAGNOSIS — Z23 NEED FOR VACCINATION: Primary | ICD-10-CM

## 2021-03-06 PROCEDURE — 0002A SARSCOV2 VAC 30MCG/0.3ML IM: CPT

## 2021-04-01 ENCOUNTER — E-ADVICE (OUTPATIENT)
Dept: CARDIOLOGY | Age: 56
End: 2021-04-01

## 2021-04-02 RX ORDER — MAGNESIUM OXIDE 400 MG/1
800 TABLET ORAL 2 TIMES DAILY
Qty: 360 TABLET | Refills: 3 | Status: SHIPPED | OUTPATIENT
Start: 2021-04-02

## 2021-04-22 ENCOUNTER — OFFICE VISIT (OUTPATIENT)
Dept: CARDIOLOGY | Age: 56
End: 2021-04-22

## 2021-04-22 ENCOUNTER — LAB ENCOUNTER (OUTPATIENT)
Dept: LAB | Facility: HOSPITAL | Age: 56
End: 2021-04-22
Attending: INTERNAL MEDICINE
Payer: COMMERCIAL

## 2021-04-22 VITALS
HEIGHT: 62 IN | DIASTOLIC BLOOD PRESSURE: 88 MMHG | BODY MASS INDEX: 44.16 KG/M2 | WEIGHT: 240 LBS | HEART RATE: 84 BPM | SYSTOLIC BLOOD PRESSURE: 144 MMHG

## 2021-04-22 DIAGNOSIS — E03.9 HYPOTHYROIDISM, UNSPECIFIED TYPE: ICD-10-CM

## 2021-04-22 DIAGNOSIS — E53.8 FOLIC ACID DEFICIENCY: ICD-10-CM

## 2021-04-22 DIAGNOSIS — I48.91 ATRIAL FIBRILLATION, UNSPECIFIED TYPE (CMD): Primary | ICD-10-CM

## 2021-04-22 DIAGNOSIS — I48.91 ATRIAL FIBRILLATION, UNSPECIFIED TYPE (HCC): ICD-10-CM

## 2021-04-22 DIAGNOSIS — K76.0 FATTY LIVER: ICD-10-CM

## 2021-04-22 DIAGNOSIS — Z94.9 TRANSPLANT: ICD-10-CM

## 2021-04-22 DIAGNOSIS — D64.2: ICD-10-CM

## 2021-04-22 DIAGNOSIS — D64.2 SECONDARY SIDEROBLASTIC ANEMIA DUE TO DRUGS AND TOXINS (CMD): ICD-10-CM

## 2021-04-22 DIAGNOSIS — E78.5 HYPERLIPOPROTEINEMIA: ICD-10-CM

## 2021-04-22 DIAGNOSIS — Z94.9 TRANSPLANT: Primary | ICD-10-CM

## 2021-04-22 PROCEDURE — 80197 ASSAY OF TACROLIMUS: CPT

## 2021-04-22 PROCEDURE — 85025 COMPLETE CBC W/AUTO DIFF WBC: CPT

## 2021-04-22 PROCEDURE — 36415 COLL VENOUS BLD VENIPUNCTURE: CPT

## 2021-04-22 PROCEDURE — 99215 OFFICE O/P EST HI 40 MIN: CPT | Performed by: INTERNAL MEDICINE

## 2021-04-22 PROCEDURE — 80061 LIPID PANEL: CPT

## 2021-04-22 PROCEDURE — 80180 DRUG SCRN QUAN MYCOPHENOLATE: CPT

## 2021-04-22 PROCEDURE — 83880 ASSAY OF NATRIURETIC PEPTIDE: CPT

## 2021-04-22 PROCEDURE — 84443 ASSAY THYROID STIM HORMONE: CPT

## 2021-04-22 PROCEDURE — 80053 COMPREHEN METABOLIC PANEL: CPT

## 2021-04-22 PROCEDURE — 83036 HEMOGLOBIN GLYCOSYLATED A1C: CPT

## 2021-04-22 ASSESSMENT — ENCOUNTER SYMPTOMS
FEVER: 0
CHILLS: 0
WEIGHT GAIN: 0
ALLERGIC/IMMUNOLOGIC COMMENTS: NO NEW FOOD ALLERGIES
SUSPICIOUS LESIONS: 0
WEIGHT LOSS: 0
HEMOPTYSIS: 0
HEMATOCHEZIA: 0
COUGH: 0
BRUISES/BLEEDS EASILY: 0

## 2021-04-27 ENCOUNTER — TELEPHONE (OUTPATIENT)
Dept: CARDIOLOGY | Age: 56
End: 2021-04-27

## 2021-06-01 ENCOUNTER — TELEPHONE (OUTPATIENT)
Dept: CARDIOLOGY | Age: 56
End: 2021-06-01

## 2021-06-01 RX ORDER — MYCOPHENOLATE MOFETIL 500 MG/1
500 TABLET ORAL EVERY 12 HOURS
Qty: 60 TABLET | Refills: 11 | Status: SHIPPED | OUTPATIENT
Start: 2021-06-01

## 2021-06-05 ENCOUNTER — APPOINTMENT (OUTPATIENT)
Dept: NUCLEAR MEDICINE | Facility: HOSPITAL | Age: 56
End: 2021-06-05
Attending: EMERGENCY MEDICINE
Payer: COMMERCIAL

## 2021-06-05 ENCOUNTER — HOSPITAL ENCOUNTER (OUTPATIENT)
Facility: HOSPITAL | Age: 56
Setting detail: OBSERVATION
Discharge: HOME | End: 2021-06-08
Attending: EMERGENCY MEDICINE | Admitting: HOSPITALIST
Payer: COMMERCIAL

## 2021-06-05 ENCOUNTER — APPOINTMENT (OUTPATIENT)
Dept: GENERAL RADIOLOGY | Facility: HOSPITAL | Age: 56
End: 2021-06-05
Attending: EMERGENCY MEDICINE
Payer: COMMERCIAL

## 2021-06-05 DIAGNOSIS — R00.2 PALPITATIONS: Primary | ICD-10-CM

## 2021-06-05 DIAGNOSIS — R00.0 TACHYCARDIA: ICD-10-CM

## 2021-06-05 PROCEDURE — 78582 LUNG VENTILAT&PERFUS IMAGING: CPT | Performed by: EMERGENCY MEDICINE

## 2021-06-05 PROCEDURE — 71045 X-RAY EXAM CHEST 1 VIEW: CPT | Performed by: EMERGENCY MEDICINE

## 2021-06-05 RX ORDER — ATORVASTATIN CALCIUM 20 MG/1
20 TABLET, FILM COATED ORAL NIGHTLY
Status: DISCONTINUED | OUTPATIENT
Start: 2021-06-05 | End: 2021-06-08

## 2021-06-05 RX ORDER — TACROLIMUS 1 MG/1
2 CAPSULE ORAL 2 TIMES DAILY
Status: DISCONTINUED | OUTPATIENT
Start: 2021-06-05 | End: 2021-06-08

## 2021-06-05 RX ORDER — LEVOTHYROXINE SODIUM 88 UG/1
88 TABLET ORAL DAILY
Status: DISCONTINUED | OUTPATIENT
Start: 2021-06-05 | End: 2021-06-08

## 2021-06-05 RX ORDER — MAGNESIUM OXIDE 400 MG (241.3 MG MAGNESIUM) TABLET
400 TABLET ONCE
Status: COMPLETED | OUTPATIENT
Start: 2021-06-05 | End: 2021-06-05

## 2021-06-05 RX ORDER — METOPROLOL TARTRATE 5 MG/5ML
5 INJECTION INTRAVENOUS ONCE
Status: COMPLETED | OUTPATIENT
Start: 2021-06-05 | End: 2021-06-05

## 2021-06-05 RX ORDER — CALCIUM CARBONATE/VITAMIN D3 250-3.125
1 TABLET ORAL 2 TIMES DAILY
Status: DISCONTINUED | OUTPATIENT
Start: 2021-06-05 | End: 2021-06-08

## 2021-06-05 RX ORDER — LOSARTAN POTASSIUM 50 MG/1
50 TABLET ORAL DAILY
Status: DISCONTINUED | OUTPATIENT
Start: 2021-06-06 | End: 2021-06-08

## 2021-06-05 RX ORDER — FOLIC ACID 1 MG/1
1 TABLET ORAL DAILY
Status: DISCONTINUED | OUTPATIENT
Start: 2021-06-05 | End: 2021-06-08

## 2021-06-05 RX ORDER — MYCOPHENOLATE MOFETIL 250 MG/1
500 CAPSULE ORAL
Status: DISCONTINUED | OUTPATIENT
Start: 2021-06-05 | End: 2021-06-08

## 2021-06-05 RX ORDER — PANTOPRAZOLE SODIUM 40 MG/1
40 TABLET, DELAYED RELEASE ORAL NIGHTLY
Status: DISCONTINUED | OUTPATIENT
Start: 2021-06-05 | End: 2021-06-08

## 2021-06-05 RX ORDER — VANCOMYCIN HYDROCHLORIDE 125 MG/1
125 CAPSULE ORAL EVERY 6 HOURS
Status: DISCONTINUED | OUTPATIENT
Start: 2021-06-05 | End: 2021-06-08

## 2021-06-05 RX ORDER — ASPIRIN 81 MG/1
81 TABLET ORAL DAILY
Status: DISCONTINUED | OUTPATIENT
Start: 2021-06-05 | End: 2021-06-08

## 2021-06-05 RX ORDER — ONDANSETRON 2 MG/ML
4 INJECTION INTRAMUSCULAR; INTRAVENOUS EVERY 6 HOURS PRN
Status: DISCONTINUED | OUTPATIENT
Start: 2021-06-05 | End: 2021-06-08

## 2021-06-05 NOTE — ED QUICK NOTES
md to bs pt  . Will contact cardiology. Pt stating she feels very nervous right now.  ST with RBBB pt c/o urinary frequency

## 2021-06-05 NOTE — ED INITIAL ASSESSMENT (HPI)
Pt here stating she feels midsternal  fluttering in her chest intermittent +sob onset last night denies radiation hx of hear transplant 4yr ago.

## 2021-06-05 NOTE — CONSULTS
[facility]  Cardiology Consultation    Brunashannon Oliveira Patient Status:  Emergency    1965 MRN [id number]   Location [address] [facility] Attending Salty Castro DO   Hosp Day # 0 PCP Dionicio Hines MD     Reason for Co atrium: The left atrium was mildly dilated. The left atrial volume      was mildly to moderately increased.      History:  Past Medical History:   Diagnosis Date   • Alkaline phosphatase elevation     seeing gi liver specialist in [facility]   • Anemia 4/7/2014 2016     Past Surgical History:   Procedure Laterality Date   • ABD PARACENTESIS      multiple   • ABLATION      cardiac ablation 5/2016   • ANGIOGRAM     • CARDIAC DEFIBRILLATOR PLACEMENT  1/2014    [employer], biV placed at [facility]   • CARDIAC kg/m²   Temp (24hrs), Av.2 °F (36.2 °C), Min:97.2 °F (36.2 °C), Max:97.2 °F (36.2 °C)     No intake or output data in the 24 hours ending 21 1355  Wt Readings from Last 3 Encounters:  21 : 240 lb (108.9 kg)  20 : 230 lb (104.3 kg)  11

## 2021-06-05 NOTE — PROGRESS NOTES
06/05/21 1717 06/05/21 1721 06/05/21 1724   Vital Signs   /83 156/84 (!) 120/94   BP Location Left arm Left arm Left arm   BP Method Automatic Automatic Automatic   Patient Position Lying Sitting Standing   Orthos +.  Pt educated on qualifying fact

## 2021-06-05 NOTE — H&P
[facility] Hospitalist H&P       CC: Patient presents with:  Arrythmia/Palpitations       PCP: Gray Smith MD    History of Present Illness:  Patient is a 64year old female with PMH sig for heart transplant, HTN, atrial tachycardia, CKD presented to ED f (postoperative nausea and vomiting)    • Renal disorder     chronic kidney disease-stage III   • Restrictive cardiomyopathy (HCC)    • Rheumatic fever    • Right-sided heart failure (HCC)    • Tricuspid regurgitation    • Valvular disease     Tricuspid nikki pacemaker not yet received   • Hypertension Mother    • Cancer Mother         breast ca in her 76s   • Breast Cancer Mother 76        dx at age 76   • Other (Other) Mother         skin cancer   • Arthritis Sister        Review of Systems  Comprehensive ROS thoracic spine. CONCLUSION:  Cardiomegaly. Status post CABG. No pulmonary edema or infiltrate.     Dictated by (CST): Bran Barron MD on 6/05/2021 at 11:42 AM     Finalized by (CST): Bran Barron MD on 6/05/2021 at 11:44 AM

## 2021-06-06 PROCEDURE — 99214 OFFICE O/P EST MOD 30 MIN: CPT | Performed by: INTERNAL MEDICINE

## 2021-06-06 RX ORDER — MAGNESIUM OXIDE 400 MG (241.3 MG MAGNESIUM) TABLET
400 TABLET ONCE
Status: COMPLETED | OUTPATIENT
Start: 2021-06-06 | End: 2021-06-06

## 2021-06-06 NOTE — PROGRESS NOTES
[facility] Hospitalist Progress Note     Elisabet Margo Patient Status:  Observation    1965 MRN [id number]   [facility] 8NE-A Attending Domingo Burger MD   Hosp Day # 0 PCP Marlen Mccray MD     CC: follow up    SUBJECTIVE:  Loose BM ye Microbiology:    No results found for this visit on 06/05/21.     Lab Results   Component Value Date    COVID19 Not Detected 06/05/2021    COVID19 Not Detected 12/04/2020          Assessment/Plan:     # Palpitations  # sinus tachycardia  # h/o heart t

## 2021-06-06 NOTE — PLAN OF CARE
Pt received at 0730 resting in bed. A&Ox4. On room air saturating >95%. Sinus on the monitor with one episode of SVT this AM, cardiology aware, pt asymptomatic. No further palpitations or any chest pain today.  Loose stool, C Diff +, PO vanco given, contact

## 2021-06-06 NOTE — PLAN OF CARE
Alert. Oriented. Sr w/ BBB per tele. Hr 70s. Denies pain. Diarrhea. Stool (+)for c.diff. [facility] hospitalist notified. Vanco oral ordered. Pt made aware. Up ad annika. Poc discussed with pt. Cont. to monitor pt.

## 2021-06-06 NOTE — PROGRESS NOTES
06/06/21 0410 06/06/21 0413 06/06/21 0415   Vital Signs   Temp src  --   --  Oral   Pulse 92 88 88   Heart Rate Source Monitor Monitor Monitor   Cardiac Rhythm  --  NSR NSR   Resp  --   --  20   Respiratory Quality  --   --  Normal   /60 139/70 Tsosie Hodgkins

## 2021-06-06 NOTE — ED PROVIDER NOTES
Patient Seen in: [facility] 8ne-a      History   Patient presents with:  Arrythmia/Palpitations    Stated Complaint: \"heart fluttering\" Denies CP    HPI/Subjective:   HPI    63 yo fm pmh of atrial fibrillation, s/p heart transplant now with c/o \" OBESITY    • Obesity    • OTHER DISEASES     complete ht block s.p pacemaker/ then coded in jan 2014 in er v fib at [address] [facility] then transferred to [facility] and got  new pacemaker with defibrillator   • OTHER DISEASES     restrictive lung dis on pft nl sleep study a Alcohol use: No      Alcohol/week: 0.0 standard drinks    Drug use: No             Review of Systems    Positive for stated complaint: \"heart fluttering\" Denies CP  Other systems are as noted in HPI. Constitutional and vital signs reviewed.       All oth D-DIMER - Abnormal; Notable for the following components:    D-Dimer 0.86 (*)     All other components within normal limits   BASIC METABOLIC PANEL (8) - Abnormal; Notable for the following components:    BUN 22 (*)     Creatinine 1.36 (*)     GFR, Non-A ------                     CBC W/ DIFFERENTIAL[[id number]]          Abnormal            Final result                 Please view results for these tests on the individual orders.    RAINBOW DRAW BLUE   RAINBOW DRAW LAVENDER   RAINBOW DRAW LIGHT GREEN   RAINB D-dimer was obtained as patient is low risk for PE.  D-dimer is positive CTA unable to be obtained as patient has decreased GFR, elevation BUN/creatinine which is seen in previous visits as well as proteinuria seen in previous UA is concerning for CKD.   Wi

## 2021-06-06 NOTE — PROGRESS NOTES
[facility] Cardiology Progress Note    Subjective: frequent loose stool yesterday, only one this am and more formed. No further palpitations.      Objective:  /77 (BP Location: Left arm)   Pulse 77   Temp 98 °F (36.7 °C) (Oral)   Resp 20   Ht 157.5 cm (5

## 2021-06-07 ENCOUNTER — APPOINTMENT (OUTPATIENT)
Dept: CV DIAGNOSTICS | Facility: HOSPITAL | Age: 56
End: 2021-06-07
Attending: NURSE PRACTITIONER
Payer: COMMERCIAL

## 2021-06-07 PROCEDURE — 99214 OFFICE O/P EST MOD 30 MIN: CPT | Performed by: INTERNAL MEDICINE

## 2021-06-07 PROCEDURE — 90792 PSYCH DIAG EVAL W/MED SRVCS: CPT | Performed by: OTHER

## 2021-06-07 PROCEDURE — 93306 TTE W/DOPPLER COMPLETE: CPT | Performed by: NURSE PRACTITIONER

## 2021-06-07 RX ORDER — HYDRALAZINE HYDROCHLORIDE 50 MG/1
50 TABLET, FILM COATED ORAL 2 TIMES DAILY
Status: DISCONTINUED | OUTPATIENT
Start: 2021-06-07 | End: 2021-06-08

## 2021-06-07 RX ORDER — MAGNESIUM OXIDE 400 MG (241.3 MG MAGNESIUM) TABLET
400 TABLET ONCE
Status: COMPLETED | OUTPATIENT
Start: 2021-06-07 | End: 2021-06-07

## 2021-06-07 RX ORDER — BUPROPION HYDROCHLORIDE 150 MG/1
150 TABLET ORAL DAILY
Status: DISCONTINUED | OUTPATIENT
Start: 2021-06-08 | End: 2021-06-08

## 2021-06-07 NOTE — PROGRESS NOTES
PSYCH CONSULT    Date of Admission: 6/5/21  Date of Consult: 6/7/21  Reason for Consultation: Eval for depression    Impression:  Primary Psychiatric Diagnosis:  Major depressive disorder, recurrent, moderate.     Rule Out Diagnoses:  Food addiction    Does

## 2021-06-07 NOTE — PLAN OF CARE
Assumed care at [address]. Pt A&Ox4. RA w/ sats 98%. NSR w/ RBBB. Continent of B&B. Denies pain at this time. Pt ambulates ad annika. All needs addressed at this time, care plan reviewed with patient, call light within reach, continue to monitor.     Problem: Manisha therapy as ordered  Outcome: Progressing     Problem: RESPIRATORY - ADULT  Goal: Achieves optimal ventilation and oxygenation  Description: INTERVENTIONS:  - Assess for changes in respiratory status  - Assess for changes in mentation and behavior  - Positi deficit  - Monitor intake, output and patient weight  - Monitor urine specific gravity, serum osmolarity and serum sodium as indicated or ordered  - Monitor response to interventions for patient's volume status, including labs, urine output, blood pressure

## 2021-06-07 NOTE — PROGRESS NOTES
Patient seen today for IVIG. Plan of care reviewed. Refuses IgA lab draw. PIV established with positive blood return. IVIG currently infusing as rate of 30ml/h. Patient confirms she like rate started at 30ml/h increased by 30ml/h every 30 minutes to max rate of 120ml/h. Patient confortable. Warm blankets provided. Call light in place and patient educated on use.    [facility] Hospitalist Progress Note     [facility]      SUBJECTIVE:  Feeling better  Diarrhea improved    OBJECTIVE:  Temp:  [97.5 °F (36.4 °C)-98.3 °F (36.8 °C)] 97.9 °F (36.6 °C)  Pulse:  [75-85] 85  Resp:  [16-18] 16  BP: (122-159)/(69-85) 150/82    In left.  There is no pulmonary edema. The heart size is mildly enlarged. No infiltrate, consolidation or pleural effusion. There is no pneumothorax. Large osteophytes are seen throughout the thoracic spine. CONCLUSION:  Cardiomegaly.   Status p per tab TABS 1 tablet, 1 tablet, Oral, BID  vancomycin HCl (VANCOCIN) cap 125 mg, 125 mg, Oral, Q6H       Assessment/Plan:      64year old female with PMH of heart transplant (at [facility] in 2016), HTN, CKD here with palpitations.      # Palpitations  # sinu

## 2021-06-07 NOTE — PROGRESS NOTES
[facility] Cardiology Progress Note    Subjective: In bed on exam without complaints this am.  She denies CP, palpitations, dizziness or SOB.      Objective:   /71 (BP Location: Left arm)   Pulse 75   Temp 97.5 °F (36.4 °C) (Oral)   Resp 18   Ht 06/07/2021    BUN 24 06/07/2021     06/07/2021    K 4.4 06/07/2021     06/07/2021    CO2 26.0 06/07/2021     06/07/2021    CA 8.8 06/07/2021    MG 1.8 06/07/2021       Lab Results   Component Value Date    PT 25.5 (H) 08/06/2014    PT 24 today  · obesity    Plan:  · Echo today  · Continue cardiac Tx med regimen  · Vanco for Cdiff - s/s improving  · Ep to eval for ST/atrial tach    Karissa Shaver, APRN  6/7/2021  0915  Add Hydralazine 50 mg BID for BP control.   Review echo as soon as brianna

## 2021-06-07 NOTE — PLAN OF CARE
Pt received at 0730 resting in bed. A&Ox4. On room air saturating >95%. Sinus on the monitor with bundle branch block. Denies palpitations, chest pain/pressure, shortness of breath. Echo done this AM EF 60-65%.  Psych consult for recent depression, Dr. Trinidad Heredia

## 2021-06-07 NOTE — CONSULTS
[facility]  Report of Psychiatric Consultation    Fadia Rice Patient Status:  Observation    1965 MRN [id number]   [facility] 8NE-A Attending Nilsa Cooney MD   Hosp Day # 2 PCP Josie Farley MD     Date of Admission: 6 She eats when she is happy and wants to celebrate. She eats when she feels sad and depressed. She recalls growing up with her mother who has bulimia. Her mother would binge and then purge in the evenings.  Her mother would make treats for her whenever she w heart failure) [facility] 4/2/2014   • Chronic atrial fibrillation ([facility] 4027-1390 approximately      NSR RBBB post Heart Transplant   • CKD (chronic kidney disease) stage 3, GFR 30-59 ml/min ([facility]) 9/9/2020   • Congestive heart disease ([facility]     right side hear [facility]. HEART BIOPSY  12/20/2018    DR QUINN   • HEART TRANSPLANT  12/15/2016    done at [facility]   • OTHER SURGICAL HISTORY      rt ovar cyst   • OTHER SURGICAL HISTORY      liver biopsy   • OTHER SURGICAL HISTORY  5/1/2013    cardioversion   • Oral, Q6H    Review of Systems   Constitutional: Positive for malaise/fatigue. Psychiatric/Behavioral: Positive for depression. Negative for suicidal ideas. The patient is nervous/anxious.       Mental Status Exam:     Objective       06/07/21  1545   BP: at times tearful as she spoke about her current situation. She denies suicidal ideation and admits that her overeating is an attempt to deal with overwhelming feelings of fear and sadness. She reports that she has employed this strategy for a number of year

## 2021-06-08 VITALS
HEART RATE: 95 BPM | RESPIRATION RATE: 18 BRPM | BODY MASS INDEX: 43.61 KG/M2 | OXYGEN SATURATION: 98 % | SYSTOLIC BLOOD PRESSURE: 149 MMHG | HEIGHT: 62 IN | TEMPERATURE: 98 F | DIASTOLIC BLOOD PRESSURE: 76 MMHG | WEIGHT: 237 LBS

## 2021-06-08 PROCEDURE — 99214 OFFICE O/P EST MOD 30 MIN: CPT | Performed by: INTERNAL MEDICINE

## 2021-06-08 PROCEDURE — 99225 SUBSEQUENT OBSERVATION CARE: CPT | Performed by: OTHER

## 2021-06-08 RX ORDER — VANCOMYCIN HYDROCHLORIDE 125 MG/1
125 CAPSULE ORAL EVERY 6 HOURS
Qty: 48 CAPSULE | Refills: 0 | Status: SHIPPED | OUTPATIENT
Start: 2021-06-08 | End: 2021-06-20

## 2021-06-08 RX ORDER — HYDRALAZINE HYDROCHLORIDE 50 MG/1
50 TABLET, FILM COATED ORAL 2 TIMES DAILY
Qty: 60 TABLET | Refills: 3 | Status: SHIPPED | OUTPATIENT
Start: 2021-06-08

## 2021-06-08 RX ORDER — BUPROPION HYDROCHLORIDE 150 MG/1
150 TABLET ORAL DAILY
Qty: 30 TABLET | Refills: 2 | Status: SHIPPED | OUTPATIENT
Start: 2021-06-09 | End: 2021-09-08

## 2021-06-08 NOTE — PLAN OF CARE
Discharge instructions given. All meds delivered by meds to beds. Fu apts discussed. Patient indicated understanding. All questions answered.

## 2021-06-08 NOTE — PROGRESS NOTES
[facility]  Advanced Heart Failure Progress Note    Fadia Rice Patient Status:  Observation    1965 MRN [id number]   [facility] 8NE-A Attending Nilsa Cooney MD   Hosp Day # 0 PCP Josie Farley MD     Subjective:  Farhad Dawson 08/05/2014    PT 24.5 (H) 08/04/2014    INR 1.53 (H) 11/17/2016    INR 1.79 (H) 10/10/2016    INR 1.66 (H) 09/27/2016     No results for input(s): BNPML in the last 168 hours.   BUN (mg/dL)   Date Value   06/08/2021 32 (H)   06/07/2021 24 (H)   06/06/2021 2 Hypothyroid     Coagulopathy (HCC)     Low zinc level     Renal insufficiency     Heart transplant recipient [facility])     Heart replaced by transplant ([facility]     Elevated brain natriuretic peptide (BNP) level     Osteopenia after menopause     CKD (chronic kid

## 2021-06-08 NOTE — DISCHARGE SUMMARY
General Medicine Discharge Summary     Patient ID:  Connie Rivas  64year old  4/14/1965    Admit date: 6/5/2021    Discharge date and time: 6/8/2021  1:18 PM     Attending Physician: Donna Evans MD    Primary Care Physician: Zev Mejia MD Normal, Disp-48 capsule, R-0    hydrALAzine HCl 50 MG Oral Tab  Take 1 tablet (50 mg total) by mouth 2 (two) times a day., Print Script, Disp-60 tablet, R-3    buPROPion HCl ER, XL, 150 MG Oral Tablet 24 Hr  Take 1 tablet (150 mg total) by mouth daily. , Pr

## 2021-06-08 NOTE — PLAN OF CARE
AO x4. Calm, cooperative. Up ad annika. NSR with BBB on cardiac monitor. Adequate saturation on room air. Lung sounds clear. Denies shortness of breath, chest discomfort, nausea/vomiting. Denies pain. Voiding without difficulty.  Skin is clean, dry, and intact Progressing     Problem: RESPIRATORY - ADULT  Goal: Achieves optimal ventilation and oxygenation  Description: INTERVENTIONS:  - Assess for changes in respiratory status  - Assess for changes in mentation and behavior  - Position to facilitate oxygenation and patient weight  - Monitor urine specific gravity, serum osmolarity and serum sodium as indicated or ordered  - Monitor response to interventions for patient's volume status, including labs, urine output, blood pressure (other measures as available)  -

## 2021-06-08 NOTE — PROGRESS NOTES
[facility]  Report of Psychiatric Progress Note    Crissy Billy Patient Status:  Observation    1965 MRN [id number]   [facility] 8NE-A Attending Saleem Calvert MD   Hosp Day # 3 PCP Shahram Santana MD     Date of Admission: Her mother would binge and then purge in the evenings. Her mother would make treats for her whenever she was upset to cheer her up. Sweets and junk food became a way of life to cope with life stressors.  She never developed binging or purging, just overeati chronic thrombocytopenia   • Calculus of kidney    • CHF (congestive heart failure) ([facility] 4/2/2014   • Chronic atrial fibrillation ([facility] [phone] approximately      NSR RBBB post Heart Transplant   • CKD (chronic kidney disease) stage 3, GFR 30-59 ml/m rectal ulcer 2016 and sessile polyp but cannot find path.  10/2016    • [facility] [place]. HEART BIOPSY  12/20/2018    DR QUINN   • HEART TRANSPLANT  12/15/2016    done at [facility]   • OTHER SURGICAL HISTORY      rt ovar cyst   • OTHER SURGICAL HISTORY Intravenous, Q6H PRN  •  Calcium Carbonate-Vitamin D 250-125 MG-UNIT per tab TABS 1 tablet, 1 tablet, Oral, BID  •  vancomycin HCl (VANCOCIN) cap 125 mg, 125 mg, Oral, Q6H    Review of Systems   Constitutional: Positive for malaise/fatigue.    Psychiatric/B

## 2021-06-08 NOTE — PLAN OF CARE
Assumed care of patient around 0730. Patient sitting up in chair, ox4. Denies any shortness of breath or chest pain. Room air, NSR on tele with BBB. Up ad annika. Call light within reach. Patient aware of poc. Ok to DC from cardiology and primary.  Will co INTERVENTIONS:  - Assess for changes in respiratory status  - Assess for changes in mentation and behavior  - Position to facilitate oxygenation and minimize respiratory effort  - Oxygen supplementation based on oxygen saturation or ABGs  - Provide Smoking ordered  - Monitor response to interventions for patient's volume status, including labs, urine output, blood pressure (other measures as available)  - Encourage oral intake as appropriate  - Instruct patient on fluid and nutrition restrictions as appropri

## 2021-06-08 NOTE — BH PROGRESS NOTE
Referrals placed in the discharge summary per Dr. Kiera Thorne request for the pt to follow up with a psychotherapist.

## 2021-06-11 ENCOUNTER — TELEPHONE (OUTPATIENT)
Dept: CARDIOLOGY | Age: 56
End: 2021-06-11

## 2021-06-14 ENCOUNTER — TELEPHONE (OUTPATIENT)
Dept: CARDIOLOGY | Age: 56
End: 2021-06-14

## 2021-06-16 RX ORDER — ESOMEPRAZOLE MAGNESIUM 40 MG/1
40 CAPSULE, DELAYED RELEASE ORAL
COMMUNITY

## 2021-06-16 RX ORDER — ALPRAZOLAM 0.5 MG/1
0.5 TABLET ORAL 3 TIMES DAILY PRN
COMMUNITY
Start: 2021-06-11

## 2021-06-16 RX ORDER — BUPROPION HYDROCHLORIDE 150 MG/1
150 TABLET ORAL DAILY
COMMUNITY
Start: 2021-06-08

## 2021-06-16 RX ORDER — HYDRALAZINE HYDROCHLORIDE 50 MG/1
50 TABLET, FILM COATED ORAL 2 TIMES DAILY
COMMUNITY
Start: 2021-06-08

## 2021-06-17 ENCOUNTER — TELEPHONE (OUTPATIENT)
Dept: CARDIOLOGY | Age: 56
End: 2021-06-17

## 2021-06-17 DIAGNOSIS — E03.9 HYPOTHYROIDISM, UNSPECIFIED TYPE: ICD-10-CM

## 2021-06-17 DIAGNOSIS — D64.2 SECONDARY SIDEROBLASTIC ANEMIA DUE TO DRUGS AND TOXINS (CMD): ICD-10-CM

## 2021-06-17 DIAGNOSIS — Z94.9 TRANSPLANT: Primary | ICD-10-CM

## 2021-06-17 DIAGNOSIS — E78.5 HYPERLIPOPROTEINEMIA: ICD-10-CM

## 2021-06-18 ENCOUNTER — ANCILLARY PROCEDURE (OUTPATIENT)
Dept: CARDIOLOGY | Age: 56
End: 2021-06-18
Attending: INTERNAL MEDICINE

## 2021-06-18 ENCOUNTER — OFFICE VISIT (OUTPATIENT)
Dept: CARDIOLOGY | Age: 56
End: 2021-06-18

## 2021-06-18 VITALS
DIASTOLIC BLOOD PRESSURE: 66 MMHG | BODY MASS INDEX: 44.42 KG/M2 | SYSTOLIC BLOOD PRESSURE: 126 MMHG | HEART RATE: 97 BPM | WEIGHT: 241.4 LBS | HEIGHT: 62 IN

## 2021-06-18 DIAGNOSIS — R00.2 PALPITATIONS: ICD-10-CM

## 2021-06-18 DIAGNOSIS — T86.298 OTHER COMPLICATION OF HEART TRANSPLANT (CMD): Primary | ICD-10-CM

## 2021-06-18 PROCEDURE — 93224 XTRNL ECG REC UP TO 48 HRS: CPT | Performed by: INTERNAL MEDICINE

## 2021-06-18 PROCEDURE — 99214 OFFICE O/P EST MOD 30 MIN: CPT | Performed by: INTERNAL MEDICINE

## 2021-06-18 RX ORDER — METOPROLOL SUCCINATE 50 MG/1
50 TABLET, EXTENDED RELEASE ORAL NIGHTLY
Qty: 90 TABLET | Refills: 3 | Status: SHIPPED | OUTPATIENT
Start: 2021-06-18

## 2021-06-18 ASSESSMENT — ENCOUNTER SYMPTOMS
WEIGHT LOSS: 0
ALLERGIC/IMMUNOLOGIC COMMENTS: NO NEW FOOD ALLERGIES
WEIGHT GAIN: 0
HEMOPTYSIS: 0
BRUISES/BLEEDS EASILY: 0
SUSPICIOUS LESIONS: 0
HEMATOCHEZIA: 0
CHILLS: 0
FEVER: 0
COUGH: 0

## 2021-06-18 ASSESSMENT — PATIENT HEALTH QUESTIONNAIRE - PHQ9
SUM OF ALL RESPONSES TO PHQ9 QUESTIONS 1 AND 2: 0
SUM OF ALL RESPONSES TO PHQ9 QUESTIONS 1 AND 2: 0
2. FEELING DOWN, DEPRESSED OR HOPELESS: NOT AT ALL
1. LITTLE INTEREST OR PLEASURE IN DOING THINGS: NOT AT ALL
CLINICAL INTERPRETATION OF PHQ2 SCORE: NO FURTHER SCREENING NEEDED
CLINICAL INTERPRETATION OF PHQ9 SCORE: NO FURTHER SCREENING NEEDED

## 2021-08-25 ENCOUNTER — IMMUNIZATION (OUTPATIENT)
Dept: LAB | Facility: HOSPITAL | Age: 56
End: 2021-08-25
Attending: EMERGENCY MEDICINE
Payer: COMMERCIAL

## 2021-08-25 DIAGNOSIS — Z23 NEED FOR VACCINATION: Primary | ICD-10-CM

## 2021-08-25 PROCEDURE — 0003A SARSCOV2 VAC 30MCG/0.3ML IM: CPT

## 2021-09-23 NOTE — PROGRESS NOTES
PC[facility] and [facility] with Dr. Opal Blanton on Friday Nov. 12 @ 9:30AM. Electronically signed by Sanjeev Garcia on 9/23/2021 at 11:45 AM Received patient from cath lab in bed. Right groin soft, CDI, no hematoma. Venous sheath in place. Arterial site closed with angioseal. Dr. Vuong Ran @ bedside to talk with patient and family. Recheck ACT @ 7349. Will continue to monitor.     @ 1515, right ve

## 2021-10-26 ENCOUNTER — ORDER TRANSCRIPTION (OUTPATIENT)
Dept: ADMINISTRATIVE | Facility: HOSPITAL | Age: 56
End: 2021-10-26

## 2021-10-26 ENCOUNTER — LAB ENCOUNTER (OUTPATIENT)
Dept: LAB | Facility: HOSPITAL | Age: 56
End: 2021-10-26
Attending: INTERNAL MEDICINE
Payer: COMMERCIAL

## 2021-10-26 DIAGNOSIS — Z94.1 H/O HEART TRANSPLANT (HCC): ICD-10-CM

## 2021-10-26 DIAGNOSIS — I48.91 ATRIAL FIBRILLATION (HCC): ICD-10-CM

## 2021-10-26 DIAGNOSIS — R53.83 OTHER FATIGUE: ICD-10-CM

## 2021-10-26 DIAGNOSIS — Z94.1 HEART REPLACED BY TRANSPLANT (HCC): Primary | ICD-10-CM

## 2021-10-26 DIAGNOSIS — K76.0 FATTY LIVER: ICD-10-CM

## 2021-10-26 DIAGNOSIS — E53.8 FOLIC ACID DEFICIENCY (NON ANEMIC): ICD-10-CM

## 2021-10-26 DIAGNOSIS — D64.2: ICD-10-CM

## 2021-10-26 DIAGNOSIS — T86.20: Primary | ICD-10-CM

## 2021-10-26 DIAGNOSIS — R00.2 PALPITATIONS: ICD-10-CM

## 2021-10-26 DIAGNOSIS — E03.9 HYPOTHYROIDISM: ICD-10-CM

## 2021-10-26 PROCEDURE — 36415 COLL VENOUS BLD VENIPUNCTURE: CPT

## 2021-10-26 PROCEDURE — 80061 LIPID PANEL: CPT

## 2021-10-26 PROCEDURE — 80197 ASSAY OF TACROLIMUS: CPT

## 2021-10-26 PROCEDURE — 84443 ASSAY THYROID STIM HORMONE: CPT

## 2021-10-26 PROCEDURE — 85025 COMPLETE CBC W/AUTO DIFF WBC: CPT

## 2021-10-26 PROCEDURE — 80053 COMPREHEN METABOLIC PANEL: CPT

## 2021-10-26 PROCEDURE — 80180 DRUG SCRN QUAN MYCOPHENOLATE: CPT

## 2021-10-26 PROCEDURE — 82306 VITAMIN D 25 HYDROXY: CPT

## 2021-10-26 PROCEDURE — 83036 HEMOGLOBIN GLYCOSYLATED A1C: CPT

## 2021-10-26 PROCEDURE — 83880 ASSAY OF NATRIURETIC PEPTIDE: CPT

## 2021-12-07 ENCOUNTER — ORDER TRANSCRIPTION (OUTPATIENT)
Dept: ADMINISTRATIVE | Facility: HOSPITAL | Age: 56
End: 2021-12-07

## 2021-12-07 DIAGNOSIS — Z01.818 PREOP EXAMINATION: Primary | ICD-10-CM

## 2021-12-07 DIAGNOSIS — Z11.59 ENCOUNTER FOR SCREENING FOR OTHER VIRAL DISEASES: ICD-10-CM

## 2021-12-09 ENCOUNTER — HOSPITAL ENCOUNTER (OUTPATIENT)
Dept: GENERAL RADIOLOGY | Age: 56
Discharge: HOME | End: 2021-12-09
Attending: INTERNAL MEDICINE
Payer: COMMERCIAL

## 2021-12-09 ENCOUNTER — HOSPITAL ENCOUNTER (OUTPATIENT)
Dept: BONE DENSITY | Age: 56
Discharge: HOME | End: 2021-12-09
Attending: INTERNAL MEDICINE
Payer: COMMERCIAL

## 2021-12-09 DIAGNOSIS — R00.2 PALPITATIONS: ICD-10-CM

## 2021-12-09 DIAGNOSIS — T86.20 COMPLICATION OF HEART TRANSPLANT, UNSPECIFIED COMPLICATION (HCC): ICD-10-CM

## 2021-12-09 DIAGNOSIS — R53.83 OTHER FATIGUE: ICD-10-CM

## 2021-12-09 DIAGNOSIS — Z94.1 H/O HEART TRANSPLANT (HCC): ICD-10-CM

## 2021-12-09 PROCEDURE — 71046 X-RAY EXAM CHEST 2 VIEWS: CPT | Performed by: INTERNAL MEDICINE

## 2021-12-09 PROCEDURE — 77080 DXA BONE DENSITY AXIAL: CPT | Performed by: INTERNAL MEDICINE

## 2021-12-20 PROBLEM — N28.9 RENAL INSUFFICIENCY: Status: RESOLVED | Noted: 2017-09-07 | Resolved: 2021-12-20

## 2021-12-20 PROBLEM — D64.9 CHRONIC ANEMIA: Status: ACTIVE | Noted: 2021-12-20

## 2021-12-24 ENCOUNTER — LAB ENCOUNTER (OUTPATIENT)
Dept: LAB | Age: 56
End: 2021-12-24
Attending: FAMILY MEDICINE
Payer: COMMERCIAL

## 2021-12-24 DIAGNOSIS — Z11.59 ENCOUNTER FOR SCREENING FOR OTHER VIRAL DISEASES: ICD-10-CM

## 2021-12-24 DIAGNOSIS — Z01.818 PREOP EXAMINATION: ICD-10-CM

## 2021-12-24 DIAGNOSIS — Z20.822 EXPOSURE TO 2019 NOVEL CORONAVIRUS: ICD-10-CM

## 2022-01-24 ENCOUNTER — LAB ENCOUNTER (OUTPATIENT)
Dept: LAB | Age: 57
End: 2022-01-24
Attending: INTERNAL MEDICINE
Payer: COMMERCIAL

## 2022-01-24 DIAGNOSIS — Z94.1 HEART TRANSPLANTED (HCC): ICD-10-CM

## 2022-01-24 LAB
ANION GAP SERPL CALC-SCNC: 4 MMOL/L (ref 0–18)
BASOPHILS # BLD AUTO: 0.02 X10(3) UL (ref 0–0.2)
BASOPHILS NFR BLD AUTO: 0.7 %
BUN BLD-MCNC: 31 MG/DL (ref 7–18)
BUN/CREAT SERPL: 18 (ref 10–20)
CALCIUM BLD-MCNC: 9.4 MG/DL (ref 8.5–10.1)
CHLORIDE SERPL-SCNC: 111 MMOL/L (ref 98–112)
CO2 SERPL-SCNC: 27 MMOL/L (ref 21–32)
CREAT BLD-MCNC: 1.72 MG/DL
DEPRECATED RDW RBC AUTO: 45.2 FL (ref 35.1–46.3)
EOSINOPHIL # BLD AUTO: 0.35 X10(3) UL (ref 0–0.7)
EOSINOPHIL NFR BLD AUTO: 11.7 %
ERYTHROCYTE [DISTWIDTH] IN BLOOD BY AUTOMATED COUNT: 13.2 % (ref 11–15)
FASTING STATUS PATIENT QL REPORTED: YES
GLUCOSE BLD-MCNC: 103 MG/DL (ref 70–99)
HCT VFR BLD AUTO: 35.5 %
HGB BLD-MCNC: 10.8 G/DL
IMM GRANULOCYTES # BLD AUTO: 0.02 X10(3) UL (ref 0–1)
IMM GRANULOCYTES NFR BLD: 0.7 %
LYMPHOCYTES # BLD AUTO: 0.7 X10(3) UL (ref 1–4)
LYMPHOCYTES NFR BLD AUTO: 23.4 %
MCH RBC QN AUTO: 28.4 PG (ref 26–34)
MCHC RBC AUTO-ENTMCNC: 30.4 G/DL (ref 31–37)
MCV RBC AUTO: 93.4 FL
MONOCYTES # BLD AUTO: 0.85 X10(3) UL (ref 0.1–1)
MONOCYTES NFR BLD AUTO: 28.4 %
NEUTROPHILS # BLD AUTO: 1.05 X10 (3) UL (ref 1.5–7.7)
NEUTROPHILS # BLD AUTO: 1.05 X10(3) UL (ref 1.5–7.7)
NEUTROPHILS NFR BLD AUTO: 35.1 %
OSMOLALITY SERPL CALC.SUM OF ELEC: 301 MOSM/KG (ref 275–295)
PLATELET # BLD AUTO: 213 10(3)UL (ref 150–450)
POTASSIUM SERPL-SCNC: 5.9 MMOL/L (ref 3.5–5.1)
RBC # BLD AUTO: 3.8 X10(6)UL
SARS-COV-2 RNA RESP QL NAA+PROBE: NOT DETECTED
SODIUM SERPL-SCNC: 142 MMOL/L (ref 136–145)
WBC # BLD AUTO: 3 X10(3) UL (ref 4–11)

## 2022-01-24 PROCEDURE — 85025 COMPLETE CBC W/AUTO DIFF WBC: CPT

## 2022-01-24 PROCEDURE — 36415 COLL VENOUS BLD VENIPUNCTURE: CPT

## 2022-01-24 PROCEDURE — 80048 BASIC METABOLIC PNL TOTAL CA: CPT

## 2022-01-26 ENCOUNTER — HOSPITAL ENCOUNTER (OUTPATIENT)
Dept: INTERVENTIONAL RADIOLOGY/VASCULAR | Facility: HOSPITAL | Age: 57
Discharge: HOME | End: 2022-01-26
Attending: INTERNAL MEDICINE | Admitting: INTERNAL MEDICINE
Payer: COMMERCIAL

## 2022-01-26 VITALS
SYSTOLIC BLOOD PRESSURE: 142 MMHG | BODY MASS INDEX: 39.56 KG/M2 | HEART RATE: 65 BPM | RESPIRATION RATE: 22 BRPM | TEMPERATURE: 97 F | HEIGHT: 62 IN | OXYGEN SATURATION: 97 % | DIASTOLIC BLOOD PRESSURE: 68 MMHG | WEIGHT: 215 LBS

## 2022-01-26 DIAGNOSIS — Z94.1 HEART TRANSPLANT RECIPIENT (HCC): ICD-10-CM

## 2022-01-26 DIAGNOSIS — Z94.1 HEART TRANSPLANTED (HCC): Primary | ICD-10-CM

## 2022-01-26 LAB
ISTAT ACTIVATED CLOTTING TIME: 184 SECONDS (ref 74–137)
ISTAT ACTIVATED CLOTTING TIME: 297 SECONDS (ref 74–137)
POTASSIUM SERPL-SCNC: 4.7 MMOL/L (ref 3.5–5.1)

## 2022-01-26 PROCEDURE — 99153 MOD SED SAME PHYS/QHP EA: CPT

## 2022-01-26 PROCEDURE — 99152 MOD SED SAME PHYS/QHP 5/>YRS: CPT

## 2022-01-26 PROCEDURE — 93456 R HRT CORONARY ARTERY ANGIO: CPT

## 2022-01-26 PROCEDURE — 92979 ENDOLUMINL IVUS OCT C EA: CPT

## 2022-01-26 PROCEDURE — 92978 ENDOLUMINL IVUS OCT C 1ST: CPT

## 2022-01-26 PROCEDURE — B241ZZ3 ULTRASONOGRAPHY OF MULTIPLE CORONARY ARTERIES, INTRAVASCULAR: ICD-10-PCS | Performed by: INTERNAL MEDICINE

## 2022-01-26 PROCEDURE — 85347 COAGULATION TIME ACTIVATED: CPT

## 2022-01-26 PROCEDURE — 84132 ASSAY OF SERUM POTASSIUM: CPT | Performed by: INTERNAL MEDICINE

## 2022-01-26 PROCEDURE — 4A023N6 MEASUREMENT OF CARDIAC SAMPLING AND PRESSURE, RIGHT HEART, PERCUTANEOUS APPROACH: ICD-10-PCS | Performed by: INTERNAL MEDICINE

## 2022-01-26 PROCEDURE — B2111ZZ FLUOROSCOPY OF MULTIPLE CORONARY ARTERIES USING LOW OSMOLAR CONTRAST: ICD-10-PCS | Performed by: INTERNAL MEDICINE

## 2022-01-26 RX ORDER — BIVALIRUDIN 250 MG/5ML
INJECTION, POWDER, LYOPHILIZED, FOR SOLUTION INTRAVENOUS
Status: COMPLETED
Start: 2022-01-26 | End: 2022-01-26

## 2022-01-26 RX ORDER — SODIUM CHLORIDE 9 MG/ML
INJECTION, SOLUTION INTRAVENOUS
Status: COMPLETED | OUTPATIENT
Start: 2022-01-27 | End: 2022-01-26

## 2022-01-26 RX ORDER — LIDOCAINE HYDROCHLORIDE 10 MG/ML
INJECTION, SOLUTION EPIDURAL; INFILTRATION; INTRACAUDAL; PERINEURAL
Status: COMPLETED
Start: 2022-01-26 | End: 2022-01-26

## 2022-01-26 RX ORDER — MIDAZOLAM HYDROCHLORIDE 1 MG/ML
INJECTION INTRAMUSCULAR; INTRAVENOUS
Status: COMPLETED
Start: 2022-01-26 | End: 2022-01-26

## 2022-01-26 RX ADMIN — SODIUM CHLORIDE: 9 INJECTION, SOLUTION INTRAVENOUS at 10:39:00

## 2022-01-26 NOTE — H&P
[address]       Please see scanned recent H&P from [address] office. History reviewed and up to date. No changes to H&P.  The patient is scheduled for RHC, LHC and intracoronary ultrasound of LMCA and LAD artery to assess for allograft vasculo

## 2022-01-26 NOTE — PROCEDURES
[facility]    PATIENT'S NAME: Kirsten Argueta   ATTENDING PHYSICIAN: Pasha Ascencio M.D. OPERATING PHYSICIAN: Ganesh Ivey M.D.    PATIENT ACCOUNT#:   [de-identified]    LOCATION:  [facility] 11 ED  MEDICAL RECORD #:   [id number] capillary wedge pressure catheter. We measured right heart pressures and calculated cardiac output using a Rubens equation on room air.   We also obtained saturation from pulmonary artery, right atrium and aorta to screen for any intracardiac shunt on room a Right atrial pressure was 5 mmHg. 2.   RV pressure 31/4/8 mmHg. 3.   Pulmonary capillary wedge pressure 11 mmHg. 4.   Pulmonary artery pressure 32/10/20 mmHg. 5.   Rubens cardiac output was 7.3 L/minute with cardiac index of 3.7 L/minute per sq m.   6. the plaque was 19.8 sq m. IMPRESSION:    1. Normal coronary arteries with dominant right coronary arterial system.   2.   Intracoronary ultrasound revealed only minimal and not circumferential intimal hyperplasia in proximal LAD artery and very small s

## 2022-01-26 NOTE — PROGRESS NOTES
Rc'd pt from cath lab s/p [facility] in stable condition. VSS. Angioseal to right groin artery and venous sheath to right groin are soft, clean and dry. No bleeding or hematoma. Pt denies c/o pain or discomfort. Dr Shira Harley at bedside along with pts sister.      1

## 2022-03-15 ENCOUNTER — LAB ENCOUNTER (OUTPATIENT)
Dept: LAB | Age: 57
End: 2022-03-15
Attending: INTERNAL MEDICINE
Payer: COMMERCIAL

## 2022-03-15 DIAGNOSIS — Z11.59 SCREENING FOR VIRAL DISEASE: ICD-10-CM

## 2022-03-15 DIAGNOSIS — Z01.818 PREOP EXAMINATION: ICD-10-CM

## 2022-03-16 LAB — SARS-COV-2 RNA RESP QL NAA+PROBE: NOT DETECTED

## 2022-03-18 ENCOUNTER — HOSPITAL ENCOUNTER (OUTPATIENT)
Dept: CV DIAGNOSTICS | Facility: HOSPITAL | Age: 57
Discharge: HOME | End: 2022-03-18
Attending: INTERNAL MEDICINE
Payer: COMMERCIAL

## 2022-03-18 ENCOUNTER — RT VISIT (OUTPATIENT)
Dept: RESPIRATORY THERAPY | Facility: HOSPITAL | Age: 57
End: 2022-03-18
Attending: INTERNAL MEDICINE
Payer: COMMERCIAL

## 2022-03-18 DIAGNOSIS — T86.20: ICD-10-CM

## 2022-03-18 DIAGNOSIS — R53.83 FATIGUE: ICD-10-CM

## 2022-03-18 DIAGNOSIS — Z94.1 HISTORY OF HEART TRANSPLANT (HCC): ICD-10-CM

## 2022-03-18 DIAGNOSIS — Z94.1 H/O HEART TRANSPLANT (HCC): ICD-10-CM

## 2022-03-18 DIAGNOSIS — R00.2 PALPITATIONS: ICD-10-CM

## 2022-03-18 PROCEDURE — 94010 BREATHING CAPACITY TEST: CPT

## 2022-03-18 PROCEDURE — 93350 STRESS TTE ONLY: CPT | Performed by: INTERNAL MEDICINE

## 2022-03-18 PROCEDURE — 93017 CV STRESS TEST TRACING ONLY: CPT | Performed by: INTERNAL MEDICINE

## 2022-03-18 PROCEDURE — 94726 PLETHYSMOGRAPHY LUNG VOLUMES: CPT

## 2022-03-18 PROCEDURE — 94729 DIFFUSING CAPACITY: CPT

## 2022-03-18 PROCEDURE — 93018 CV STRESS TEST I&R ONLY: CPT | Performed by: INTERNAL MEDICINE

## 2022-03-18 RX ORDER — DOBUTAMINE HYDROCHLORIDE 200 MG/100ML
INJECTION INTRAVENOUS
Status: COMPLETED
Start: 2022-03-18 | End: 2022-03-18

## 2022-03-18 RX ADMIN — DOBUTAMINE HYDROCHLORIDE 40 MCG/KG/MIN: 200 INJECTION INTRAVENOUS at 11:00:00

## 2022-03-18 NOTE — PROCEDURES
Findings:  FEV1 is 1.67L, 69% predicted. FVC is 2.03L, 67% predicted. FEV1/ FVC ratio is 0.83. The flow-volume loop demonstrates a normal pattern. The TLC is 3.70L, 80% predicted. The residual volume 1.68L, 96% predicted. The diffusion capacity is 56% predicted and 82% predicted when corrected for alveolar volume. Impression:  There is no airway obstruction on spirometry and visualized on flow-volume loop. Lung volumes are normal.  Diffusion capacity is moderately reduced with DLCO of 56%. The isolated decreased in diffusion capacity can be seen in interstitial lung disease, pulmonary vascular disease (such as pulmonary hypertension) and anemia. If not already performed, would suggest further evaluation as determined clinically. When compared to previous pulmonary function testing dated 11/13/2019, there has been non-significant decreases in FEV1 (previously 1.84L, 74% predicted), FVC (previously 2.19L, 71% predicted), total lung capacity (previously 3.95L, 86% predicted) and diffusion capacity (previously DLCO 64% predicted).

## 2022-04-22 ENCOUNTER — LAB ENCOUNTER (OUTPATIENT)
Dept: LAB | Age: 57
End: 2022-04-22
Attending: COUNSELOR
Payer: COMMERCIAL

## 2022-04-22 DIAGNOSIS — R53.83 FATIGUE: ICD-10-CM

## 2022-04-22 DIAGNOSIS — D64.9 ANEMIA, UNSPECIFIED: Primary | ICD-10-CM

## 2022-04-22 DIAGNOSIS — Z94.1 HEART REPLACED BY TRANSPLANT (HCC): ICD-10-CM

## 2022-04-22 DIAGNOSIS — D84.821 IMMUNODEFICIENCY DUE TO LONG TERM DRUG THERAPY (HCC): ICD-10-CM

## 2022-04-22 DIAGNOSIS — Z79.899 IMMUNODEFICIENCY DUE TO LONG TERM DRUG THERAPY (HCC): ICD-10-CM

## 2022-04-22 DIAGNOSIS — Z48.21 ENCOUNTER FOR AFTERCARE FOLLOWING HEART TRANSPLANT (HCC): ICD-10-CM

## 2022-04-22 LAB
BASOPHILS # BLD AUTO: 0.01 X10(3) UL (ref 0–0.2)
BASOPHILS NFR BLD AUTO: 0.4 %
DEPRECATED RDW RBC AUTO: 41.2 FL (ref 35.1–46.3)
EOSINOPHIL # BLD AUTO: 0.37 X10(3) UL (ref 0–0.7)
EOSINOPHIL NFR BLD AUTO: 14.2 %
ERYTHROCYTE [DISTWIDTH] IN BLOOD BY AUTOMATED COUNT: 12.4 % (ref 11–15)
HCT VFR BLD AUTO: 34.6 %
HGB BLD-MCNC: 10.7 G/DL
IMM GRANULOCYTES # BLD AUTO: 0.02 X10(3) UL (ref 0–1)
IMM GRANULOCYTES NFR BLD: 0.8 %
LYMPHOCYTES # BLD AUTO: 0.65 X10(3) UL (ref 1–4)
LYMPHOCYTES NFR BLD AUTO: 25 %
MCH RBC QN AUTO: 28.4 PG (ref 26–34)
MCHC RBC AUTO-ENTMCNC: 30.9 G/DL (ref 31–37)
MCV RBC AUTO: 91.8 FL
MONOCYTES # BLD AUTO: 0.69 X10(3) UL (ref 0.1–1)
MONOCYTES NFR BLD AUTO: 26.5 %
NEUTROPHILS # BLD AUTO: 0.86 X10 (3) UL (ref 1.5–7.7)
NEUTROPHILS # BLD AUTO: 0.86 X10(3) UL (ref 1.5–7.7)
NEUTROPHILS NFR BLD AUTO: 33.1 %
PLATELET # BLD AUTO: 194 10(3)UL (ref 150–450)
RBC # BLD AUTO: 3.77 X10(6)UL
SARS-COV-2 IGG+IGM SERPL QL IA: NONREACTIVE
WBC # BLD AUTO: 2.6 X10(3) UL (ref 4–11)

## 2022-04-22 PROCEDURE — 85060 BLOOD SMEAR INTERPRETATION: CPT

## 2022-04-22 PROCEDURE — 86769 SARS-COV-2 COVID-19 ANTIBODY: CPT

## 2022-04-22 PROCEDURE — 85025 COMPLETE CBC W/AUTO DIFF WBC: CPT

## 2022-04-22 PROCEDURE — 80180 DRUG SCRN QUAN MYCOPHENOLATE: CPT

## 2022-04-22 PROCEDURE — 36415 COLL VENOUS BLD VENIPUNCTURE: CPT

## 2022-04-25 LAB
MYCOPHENOLIC ACID GLUCURONIDE: 36.7 UG/ML
MYCOPHENOLIC ACID: 0.9 UG/ML

## 2022-05-06 ENCOUNTER — LAB ENCOUNTER (OUTPATIENT)
Dept: LAB | Age: 57
End: 2022-05-06
Attending: INTERNAL MEDICINE
Payer: COMMERCIAL

## 2022-05-10 ENCOUNTER — LAB ENCOUNTER (OUTPATIENT)
Dept: LAB | Age: 57
End: 2022-05-10
Attending: INTERNAL MEDICINE
Payer: COMMERCIAL

## 2022-05-10 DIAGNOSIS — Z48.21 FOLLOW-UP EXAMINATION AFTER HEART TRANSPLANT (HCC): ICD-10-CM

## 2022-05-10 DIAGNOSIS — T86.20: Primary | ICD-10-CM

## 2022-05-10 DIAGNOSIS — D72.819 LEUKOCYTOPENIA: ICD-10-CM

## 2022-05-10 DIAGNOSIS — Z94.1 H/O HEART TRANSPLANT (HCC): ICD-10-CM

## 2022-05-10 PROCEDURE — 36415 COLL VENOUS BLD VENIPUNCTURE: CPT

## 2022-05-10 PROCEDURE — 87496 CYTOMEG DNA AMP PROBE: CPT

## 2022-05-15 LAB — CYTOMEGALOVIRUS DETECTION, PCR: NOT DETECTED

## 2022-06-28 ENCOUNTER — HOSPITAL ENCOUNTER (OUTPATIENT)
Dept: LAB | Facility: HOSPITAL | Age: 57
Discharge: HOME | End: 2022-06-28
Attending: INTERNAL MEDICINE
Payer: COMMERCIAL

## 2022-06-28 DIAGNOSIS — Z94.1 HEART REPLACED BY TRANSPLANT (HCC): ICD-10-CM

## 2022-06-28 DIAGNOSIS — E53.8 FOLIC ACID DEFICIENCY (NON ANEMIC): ICD-10-CM

## 2022-06-28 DIAGNOSIS — I48.91 ATRIAL FIBRILLATION (HCC): ICD-10-CM

## 2022-06-28 DIAGNOSIS — K76.0 FATTY LIVER: ICD-10-CM

## 2022-06-28 DIAGNOSIS — D64.2: ICD-10-CM

## 2022-06-28 DIAGNOSIS — E03.9 HYPOTHYROIDISM: ICD-10-CM

## 2022-06-28 LAB
BASOPHILS # BLD AUTO: 0.05 X10(3) UL (ref 0–0.2)
BASOPHILS NFR BLD AUTO: 1.2 %
DEPRECATED HBV CORE AB SER IA-ACNC: 378.4 NG/ML
EOSINOPHIL # BLD AUTO: 0.41 X10(3) UL (ref 0–0.7)
EOSINOPHIL NFR BLD AUTO: 9.5 %
ERYTHROCYTE [DISTWIDTH] IN BLOOD BY AUTOMATED COUNT: 13 %
HCT VFR BLD AUTO: 28.5 %
HGB BLD-MCNC: 9.1 G/DL
IMM GRANULOCYTES # BLD AUTO: 0.07 X10(3) UL (ref 0–1)
IMM GRANULOCYTES NFR BLD: 1.6 %
IRON SATN MFR SERPL: 23 %
IRON SERPL-MCNC: 55 UG/DL
LYMPHOCYTES # BLD AUTO: 0.91 X10(3) UL (ref 1–4)
LYMPHOCYTES NFR BLD AUTO: 21.1 %
MCH RBC QN AUTO: 28.1 PG (ref 26–34)
MCHC RBC AUTO-ENTMCNC: 31.9 G/DL (ref 31–37)
MCV RBC AUTO: 88 FL
MONOCYTES # BLD AUTO: 0.86 X10(3) UL (ref 0.1–1)
MONOCYTES NFR BLD AUTO: 20 %
NEUTROPHILS # BLD AUTO: 2.01 X10 (3) UL (ref 1.5–7.7)
NEUTROPHILS # BLD AUTO: 2.01 X10(3) UL (ref 1.5–7.7)
NEUTROPHILS NFR BLD AUTO: 46.6 %
PLATELET # BLD AUTO: 257 10(3)UL (ref 150–450)
PLATELET MORPHOLOGY: NORMAL
RBC # BLD AUTO: 3.24 X10(6)UL
TIBC SERPL-MCNC: 235 UG/DL (ref 240–450)
TRANSFERRIN SERPL-MCNC: 158 MG/DL (ref 200–360)
WBC # BLD AUTO: 4.3 X10(3) UL (ref 4–11)

## 2022-06-28 PROCEDURE — 85025 COMPLETE CBC W/AUTO DIFF WBC: CPT | Performed by: INTERNAL MEDICINE

## 2022-06-28 PROCEDURE — 36415 COLL VENOUS BLD VENIPUNCTURE: CPT | Performed by: INTERNAL MEDICINE

## 2022-06-28 PROCEDURE — 83540 ASSAY OF IRON: CPT | Performed by: INTERNAL MEDICINE

## 2022-06-28 PROCEDURE — 82728 ASSAY OF FERRITIN: CPT | Performed by: INTERNAL MEDICINE

## 2022-06-28 PROCEDURE — 83550 IRON BINDING TEST: CPT | Performed by: INTERNAL MEDICINE

## 2022-10-04 ENCOUNTER — LAB ENCOUNTER (OUTPATIENT)
Dept: LAB | Age: 57
End: 2022-10-04
Attending: INTERNAL MEDICINE
Payer: COMMERCIAL

## 2022-10-04 DIAGNOSIS — T86.20: ICD-10-CM

## 2022-10-04 DIAGNOSIS — D64.9 ANEMIA: ICD-10-CM

## 2022-10-04 DIAGNOSIS — R79.89 ELEVATED BRAIN NATRIURETIC PEPTIDE (BNP) LEVEL: ICD-10-CM

## 2022-10-04 DIAGNOSIS — N18.30 CHRONIC KIDNEY DISEASE, STAGE III (MODERATE) (HCC): Primary | ICD-10-CM

## 2022-10-04 DIAGNOSIS — Z94.1 HEART REPLACED BY TRANSPLANT (HCC): ICD-10-CM

## 2022-10-04 DIAGNOSIS — Z48.21 FOLLOW-UP EXAMINATION AFTER HEART TRANSPLANT (HCC): ICD-10-CM

## 2022-10-04 DIAGNOSIS — E53.8 FOLIC ACID DEFICIENCY (NON ANEMIC): ICD-10-CM

## 2022-10-04 DIAGNOSIS — R53.83 FATIGUE: ICD-10-CM

## 2022-10-04 LAB
ALBUMIN SERPL-MCNC: 2.9 G/DL (ref 3.4–5)
ALBUMIN/GLOB SERPL: 0.7 {RATIO} (ref 1–2)
ALP LIVER SERPL-CCNC: 179 U/L
ALT SERPL-CCNC: 45 U/L
ANION GAP SERPL CALC-SCNC: 4 MMOL/L (ref 0–18)
AST SERPL-CCNC: 49 U/L (ref 15–37)
BASOPHILS # BLD AUTO: 0.02 X10(3) UL (ref 0–0.2)
BASOPHILS NFR BLD AUTO: 0.4 %
BILIRUB SERPL-MCNC: 0.3 MG/DL (ref 0.1–2)
BUN BLD-MCNC: 36 MG/DL (ref 7–18)
BUN/CREAT SERPL: 18.2 (ref 10–20)
CALCIUM BLD-MCNC: 9 MG/DL (ref 8.5–10.1)
CHLORIDE SERPL-SCNC: 106 MMOL/L (ref 98–112)
CHOLEST SERPL-MCNC: 138 MG/DL (ref ?–200)
CO2 SERPL-SCNC: 27 MMOL/L (ref 21–32)
CREAT BLD-MCNC: 1.98 MG/DL
DEPRECATED RDW RBC AUTO: 47.9 FL (ref 35.1–46.3)
EOSINOPHIL # BLD AUTO: 0.48 X10(3) UL (ref 0–0.7)
EOSINOPHIL NFR BLD AUTO: 9.7 %
ERYTHROCYTE [DISTWIDTH] IN BLOOD BY AUTOMATED COUNT: 14.1 % (ref 11–15)
EST. AVERAGE GLUCOSE BLD GHB EST-MCNC: 103 MG/DL (ref 68–126)
FASTING PATIENT LIPID ANSWER: YES
FASTING STATUS PATIENT QL REPORTED: YES
GFR SERPLBLD BASED ON 1.73 SQ M-ARVRAT: 29 ML/MIN/1.73M2 (ref 60–?)
GLOBULIN PLAS-MCNC: 4.1 G/DL (ref 2.8–4.4)
GLUCOSE BLD-MCNC: 93 MG/DL (ref 70–99)
HBA1C MFR BLD: 5.2 % (ref ?–5.7)
HCT VFR BLD AUTO: 32.8 %
HDLC SERPL-MCNC: 54 MG/DL (ref 40–59)
HGB BLD-MCNC: 10.2 G/DL
IMM GRANULOCYTES # BLD AUTO: 0.01 X10(3) UL (ref 0–1)
IMM GRANULOCYTES NFR BLD: 0.2 %
LDLC SERPL CALC-MCNC: 66 MG/DL (ref ?–100)
LYMPHOCYTES # BLD AUTO: 0.97 X10(3) UL (ref 1–4)
LYMPHOCYTES NFR BLD AUTO: 19.5 %
MCH RBC QN AUTO: 28.7 PG (ref 26–34)
MCHC RBC AUTO-ENTMCNC: 31.1 G/DL (ref 31–37)
MCV RBC AUTO: 92.1 FL
MONOCYTES # BLD AUTO: 0.68 X10(3) UL (ref 0.1–1)
MONOCYTES NFR BLD AUTO: 13.7 %
NEUTROPHILS # BLD AUTO: 2.81 X10 (3) UL (ref 1.5–7.7)
NEUTROPHILS # BLD AUTO: 2.81 X10(3) UL (ref 1.5–7.7)
NEUTROPHILS NFR BLD AUTO: 56.5 %
NONHDLC SERPL-MCNC: 84 MG/DL (ref ?–130)
NT-PROBNP SERPL-MCNC: 1780 PG/ML (ref ?–125)
OSMOLALITY SERPL CALC.SUM OF ELEC: 292 MOSM/KG (ref 275–295)
PLATELET # BLD AUTO: 191 10(3)UL (ref 150–450)
POTASSIUM SERPL-SCNC: 5.5 MMOL/L (ref 3.5–5.1)
PROT SERPL-MCNC: 7 G/DL (ref 6.4–8.2)
RBC # BLD AUTO: 3.56 X10(6)UL
SODIUM SERPL-SCNC: 137 MMOL/L (ref 136–145)
T4 FREE SERPL-MCNC: 1.2 NG/DL (ref 0.8–1.7)
TRIGL SERPL-MCNC: 97 MG/DL (ref 30–149)
TSI SER-ACNC: 4.2 MIU/ML (ref 0.36–3.74)
VIT D+METAB SERPL-MCNC: 46.5 NG/ML (ref 30–100)
VLDLC SERPL CALC-MCNC: 15 MG/DL (ref 0–30)
WBC # BLD AUTO: 5 X10(3) UL (ref 4–11)

## 2022-10-04 PROCEDURE — 84439 ASSAY OF FREE THYROXINE: CPT

## 2022-10-04 PROCEDURE — 80053 COMPREHEN METABOLIC PANEL: CPT

## 2022-10-04 PROCEDURE — 83880 ASSAY OF NATRIURETIC PEPTIDE: CPT

## 2022-10-04 PROCEDURE — 80180 DRUG SCRN QUAN MYCOPHENOLATE: CPT

## 2022-10-04 PROCEDURE — 84443 ASSAY THYROID STIM HORMONE: CPT

## 2022-10-04 PROCEDURE — 83036 HEMOGLOBIN GLYCOSYLATED A1C: CPT

## 2022-10-04 PROCEDURE — 36415 COLL VENOUS BLD VENIPUNCTURE: CPT

## 2022-10-04 PROCEDURE — 80061 LIPID PANEL: CPT

## 2022-10-04 PROCEDURE — 85025 COMPLETE CBC W/AUTO DIFF WBC: CPT

## 2022-10-04 PROCEDURE — 80197 ASSAY OF TACROLIMUS: CPT

## 2022-10-04 PROCEDURE — 82306 VITAMIN D 25 HYDROXY: CPT

## 2022-10-06 LAB — TACROLIMUS: 7.5 NG/ML

## 2022-10-09 LAB
MYCOPHENOLIC ACID GLUCURONIDE: 28.9 UG/ML
MYCOPHENOLIC ACID: 1.1 UG/ML

## 2022-10-24 ENCOUNTER — LAB ENCOUNTER (OUTPATIENT)
Dept: LAB | Age: 57
End: 2022-10-24
Attending: INTERNAL MEDICINE
Payer: COMMERCIAL

## 2022-10-24 DIAGNOSIS — I48.91 ATRIAL FIBRILLATION (HCC): ICD-10-CM

## 2022-10-24 DIAGNOSIS — R00.2 PALPITATIONS: ICD-10-CM

## 2022-10-24 DIAGNOSIS — E55.9 VITAMIN D DEFICIENCY: ICD-10-CM

## 2022-10-24 DIAGNOSIS — Z94.1 HEART REPLACED BY TRANSPLANT (HCC): ICD-10-CM

## 2022-10-24 DIAGNOSIS — R53.83 FATIGUE: ICD-10-CM

## 2022-10-24 DIAGNOSIS — E78.5 HYPERLIPIDEMIA: ICD-10-CM

## 2022-10-24 DIAGNOSIS — E03.9 PRIMARY HYPOTHYROIDISM: ICD-10-CM

## 2022-10-24 DIAGNOSIS — T86.20: ICD-10-CM

## 2022-10-24 DIAGNOSIS — D64.9 ANEMIA, UNSPECIFIED: Primary | ICD-10-CM

## 2022-10-24 LAB
ALBUMIN SERPL-MCNC: 3 G/DL (ref 3.4–5)
ALBUMIN/GLOB SERPL: 0.8 {RATIO} (ref 1–2)
ALP LIVER SERPL-CCNC: 199 U/L
ALT SERPL-CCNC: 46 U/L
ANION GAP SERPL CALC-SCNC: 6 MMOL/L (ref 0–18)
AST SERPL-CCNC: 35 U/L (ref 15–37)
BASOPHILS # BLD AUTO: 0.02 X10(3) UL (ref 0–0.2)
BASOPHILS NFR BLD AUTO: 0.4 %
BILIRUB SERPL-MCNC: 0.5 MG/DL (ref 0.1–2)
BUN BLD-MCNC: 39 MG/DL (ref 7–18)
BUN/CREAT SERPL: 18.6 (ref 10–20)
CALCIUM BLD-MCNC: 9.2 MG/DL (ref 8.5–10.1)
CHLORIDE SERPL-SCNC: 109 MMOL/L (ref 98–112)
CHOLEST SERPL-MCNC: 128 MG/DL (ref ?–200)
CO2 SERPL-SCNC: 25 MMOL/L (ref 21–32)
CREAT BLD-MCNC: 2.1 MG/DL
DEPRECATED RDW RBC AUTO: 49.9 FL (ref 35.1–46.3)
EOSINOPHIL # BLD AUTO: 0.34 X10(3) UL (ref 0–0.7)
EOSINOPHIL NFR BLD AUTO: 6.5 %
ERYTHROCYTE [DISTWIDTH] IN BLOOD BY AUTOMATED COUNT: 14.1 % (ref 11–15)
EST. AVERAGE GLUCOSE BLD GHB EST-MCNC: 100 MG/DL (ref 68–126)
FASTING PATIENT LIPID ANSWER: YES
FASTING STATUS PATIENT QL REPORTED: YES
GFR SERPLBLD BASED ON 1.73 SQ M-ARVRAT: 27 ML/MIN/1.73M2 (ref 60–?)
GLOBULIN PLAS-MCNC: 4 G/DL (ref 2.8–4.4)
GLUCOSE BLD-MCNC: 98 MG/DL (ref 70–99)
HBA1C MFR BLD: 5.1 % (ref ?–5.7)
HCT VFR BLD AUTO: 32.5 %
HDLC SERPL-MCNC: 59 MG/DL (ref 40–59)
HGB BLD-MCNC: 10.1 G/DL
IMM GRANULOCYTES # BLD AUTO: 0.01 X10(3) UL (ref 0–1)
IMM GRANULOCYTES NFR BLD: 0.2 %
LDLC SERPL CALC-MCNC: 53 MG/DL (ref ?–100)
LYMPHOCYTES # BLD AUTO: 0.97 X10(3) UL (ref 1–4)
LYMPHOCYTES NFR BLD AUTO: 18.4 %
MCH RBC QN AUTO: 29.7 PG (ref 26–34)
MCHC RBC AUTO-ENTMCNC: 31.1 G/DL (ref 31–37)
MCV RBC AUTO: 95.6 FL
MONOCYTES # BLD AUTO: 0.65 X10(3) UL (ref 0.1–1)
MONOCYTES NFR BLD AUTO: 12.4 %
NEUTROPHILS # BLD AUTO: 3.27 X10 (3) UL (ref 1.5–7.7)
NEUTROPHILS # BLD AUTO: 3.27 X10(3) UL (ref 1.5–7.7)
NEUTROPHILS NFR BLD AUTO: 62.1 %
NONHDLC SERPL-MCNC: 69 MG/DL (ref ?–130)
NT-PROBNP SERPL-MCNC: 2471 PG/ML (ref ?–125)
OSMOLALITY SERPL CALC.SUM OF ELEC: 299 MOSM/KG (ref 275–295)
PLATELET # BLD AUTO: 177 10(3)UL (ref 150–450)
POTASSIUM SERPL-SCNC: 5.4 MMOL/L (ref 3.5–5.1)
PROT SERPL-MCNC: 7 G/DL (ref 6.4–8.2)
RBC # BLD AUTO: 3.4 X10(6)UL
SODIUM SERPL-SCNC: 140 MMOL/L (ref 136–145)
TRIGL SERPL-MCNC: 85 MG/DL (ref 30–149)
TSI SER-ACNC: 3.91 MIU/ML (ref 0.36–3.74)
VIT D+METAB SERPL-MCNC: 55.2 NG/ML (ref 30–100)
VLDLC SERPL CALC-MCNC: 12 MG/DL (ref 0–30)
WBC # BLD AUTO: 5.3 X10(3) UL (ref 4–11)

## 2022-10-24 PROCEDURE — 83880 ASSAY OF NATRIURETIC PEPTIDE: CPT

## 2022-10-24 PROCEDURE — 80180 DRUG SCRN QUAN MYCOPHENOLATE: CPT

## 2022-10-24 PROCEDURE — 80053 COMPREHEN METABOLIC PANEL: CPT

## 2022-10-24 PROCEDURE — 80197 ASSAY OF TACROLIMUS: CPT

## 2022-10-24 PROCEDURE — 85025 COMPLETE CBC W/AUTO DIFF WBC: CPT

## 2022-10-24 PROCEDURE — 36415 COLL VENOUS BLD VENIPUNCTURE: CPT

## 2022-10-24 PROCEDURE — 83036 HEMOGLOBIN GLYCOSYLATED A1C: CPT

## 2022-10-24 PROCEDURE — 80061 LIPID PANEL: CPT

## 2022-10-24 PROCEDURE — 82306 VITAMIN D 25 HYDROXY: CPT

## 2022-10-24 PROCEDURE — 84443 ASSAY THYROID STIM HORMONE: CPT

## 2022-10-26 LAB
MYCOPHENOLIC ACID GLUCURONIDE: 26.1 UG/ML
MYCOPHENOLIC ACID: 0.7 UG/ML
TACROLIMUS: 5.8 NG/ML

## 2022-11-25 ENCOUNTER — IMMUNIZATION (OUTPATIENT)
Dept: LAB | Age: 57
End: 2022-11-25
Attending: EMERGENCY MEDICINE
Payer: COMMERCIAL

## 2022-11-25 DIAGNOSIS — Z23 NEED FOR VACCINATION: Primary | ICD-10-CM

## 2022-11-25 PROCEDURE — 0124A SARSCOV2 VAC BVL 30MCG/0.3ML: CPT

## 2022-11-28 ENCOUNTER — ORDER TRANSCRIPTION (OUTPATIENT)
Dept: ADMINISTRATIVE | Facility: HOSPITAL | Age: 57
End: 2022-11-28

## 2022-11-28 DIAGNOSIS — Z01.812 PRE-PROCEDURE LAB EXAM: Primary | ICD-10-CM

## 2022-12-03 ENCOUNTER — HOSPITAL ENCOUNTER (OUTPATIENT)
Dept: MAMMOGRAPHY | Age: 57
Discharge: HOME | End: 2022-12-03
Attending: INTERNAL MEDICINE
Payer: COMMERCIAL

## 2022-12-03 ENCOUNTER — HOSPITAL ENCOUNTER (OUTPATIENT)
Dept: BONE DENSITY | Age: 57
Discharge: HOME | End: 2022-12-03
Attending: INTERNAL MEDICINE
Payer: COMMERCIAL

## 2022-12-03 ENCOUNTER — HOSPITAL ENCOUNTER (OUTPATIENT)
Dept: GENERAL RADIOLOGY | Age: 57
Discharge: HOME | End: 2022-12-03
Attending: INTERNAL MEDICINE
Payer: COMMERCIAL

## 2022-12-03 DIAGNOSIS — Z94.1 HEART TRANSPLANT RECIPIENT (HCC): ICD-10-CM

## 2022-12-03 DIAGNOSIS — Z13.820 SCREENING FOR OSTEOPOROSIS: ICD-10-CM

## 2022-12-03 DIAGNOSIS — M85.80 OSTEOPENIA AFTER MENOPAUSE: ICD-10-CM

## 2022-12-03 DIAGNOSIS — Z51.81 ENCOUNTER FOR MONITORING CARDIOTOXIC DRUG THERAPY: ICD-10-CM

## 2022-12-03 DIAGNOSIS — Z94.1 HEART REPLACED BY TRANSPLANT (HCC): ICD-10-CM

## 2022-12-03 DIAGNOSIS — Z48.21 FOLLOW-UP EXAMINATION AFTER HEART TRANSPLANT (HCC): ICD-10-CM

## 2022-12-03 DIAGNOSIS — Z79.899 ENCOUNTER FOR MONITORING CARDIOTOXIC DRUG THERAPY: ICD-10-CM

## 2022-12-03 DIAGNOSIS — Z78.0 OSTEOPENIA AFTER MENOPAUSE: ICD-10-CM

## 2022-12-03 DIAGNOSIS — Z94.1 H/O HEART TRANSPLANT (HCC): ICD-10-CM

## 2022-12-03 PROCEDURE — 77080 DXA BONE DENSITY AXIAL: CPT | Performed by: INTERNAL MEDICINE

## 2022-12-03 PROCEDURE — 77063 BREAST TOMOSYNTHESIS BI: CPT | Performed by: INTERNAL MEDICINE

## 2022-12-03 PROCEDURE — 71046 X-RAY EXAM CHEST 2 VIEWS: CPT | Performed by: INTERNAL MEDICINE

## 2022-12-03 PROCEDURE — 77067 SCR MAMMO BI INCL CAD: CPT | Performed by: INTERNAL MEDICINE

## 2022-12-27 ENCOUNTER — LAB ENCOUNTER (OUTPATIENT)
Dept: LAB | Age: 57
End: 2022-12-27
Attending: INTERNAL MEDICINE
Payer: COMMERCIAL

## 2022-12-27 DIAGNOSIS — Z01.812 PRE-PROCEDURE LAB EXAM: ICD-10-CM

## 2022-12-28 LAB — SARS-COV-2 RNA RESP QL NAA+PROBE: NOT DETECTED

## 2022-12-30 ENCOUNTER — APPOINTMENT (OUTPATIENT)
Dept: RESPIRATORY THERAPY | Facility: HOSPITAL | Age: 57
End: 2022-12-30
Attending: INTERNAL MEDICINE
Payer: COMMERCIAL

## 2022-12-30 ENCOUNTER — HOSPITAL ENCOUNTER (OUTPATIENT)
Dept: CV DIAGNOSTICS | Facility: HOSPITAL | Age: 57
Discharge: HOME | End: 2022-12-30
Attending: INTERNAL MEDICINE
Payer: COMMERCIAL

## 2022-12-30 DIAGNOSIS — Z94.1 HEART TRANSPLANT RECIPIENT (HCC): ICD-10-CM

## 2022-12-30 DIAGNOSIS — Z51.81 ENCOUNTER FOR MONITORING CARDIOTOXIC DRUG THERAPY: ICD-10-CM

## 2022-12-30 DIAGNOSIS — Z79.899 ENCOUNTER FOR MONITORING CARDIOTOXIC DRUG THERAPY: ICD-10-CM

## 2022-12-30 DIAGNOSIS — Z48.21 FOLLOW-UP EXAMINATION AFTER HEART TRANSPLANT (HCC): ICD-10-CM

## 2022-12-30 DIAGNOSIS — Z94.1 H/O HEART TRANSPLANT (HCC): ICD-10-CM

## 2022-12-30 DIAGNOSIS — Z94.1 HEART REPLACED BY TRANSPLANT (HCC): ICD-10-CM

## 2022-12-30 PROCEDURE — 93017 CV STRESS TEST TRACING ONLY: CPT | Performed by: INTERNAL MEDICINE

## 2022-12-30 PROCEDURE — 93350 STRESS TTE ONLY: CPT | Performed by: INTERNAL MEDICINE

## 2022-12-30 PROCEDURE — 93018 CV STRESS TEST I&R ONLY: CPT | Performed by: INTERNAL MEDICINE

## 2022-12-30 RX ORDER — DOBUTAMINE HYDROCHLORIDE 200 MG/100ML
INJECTION INTRAVENOUS
Status: COMPLETED
Start: 2022-12-30 | End: 2022-12-30

## 2022-12-30 RX ADMIN — DOBUTAMINE HYDROCHLORIDE 40 MCG/KG/MIN: 200 INJECTION INTRAVENOUS at 10:50:00

## 2023-01-09 PROCEDURE — 94729 DIFFUSING CAPACITY: CPT | Performed by: INTERNAL MEDICINE

## 2023-01-09 PROCEDURE — 94010 BREATHING CAPACITY TEST: CPT | Performed by: INTERNAL MEDICINE

## 2023-01-09 PROCEDURE — 94726 PLETHYSMOGRAPHY LUNG VOLUMES: CPT | Performed by: INTERNAL MEDICINE

## 2023-01-09 NOTE — PROCEDURES
Findings:  FEV1 is 1.45L, 60% predicted. FVC is 1.77L, 59% predicted. FEV1/ FVC ratio is 0.82. The flow-volume loop demonstrates a restrictive pattern. The TLC is 3.75L, 81% predicted. The residual volume 1.97L, 112% predicted. The diffusion capacity is 64% predicted and 84% predicted when corrected for alveolar volume. Impression:  There is no airway obstruction on spirometry and visualized on flow-volume loop. Despite the reduced spirometric volumes and flow-volume loop, total lung capacity is in the normal range of 3.75L, 81% predicted. Diffusion capacity is mildly reduced with DLCO of 64% but improves to normal when considering alveolar volume. This may represent a normal finding but can be seen in emphysema, interstitial lung disease, pulmonary vascular disease (such as pulmonary hypertension) and anemia. If not already performed, would suggest further evaluation as determined clinically. When compared to previous pulmonary function testing dated 3/18/2022, there has been significant decreases in FEV1 (previously 1.67L, 69% predicted) and FVC (previously 2.03L, 67% predicted). There has been an increase in diffusion capacity (previously DLCO 56% predicted).

## 2023-01-11 RX ORDER — MELATONIN
325
COMMUNITY

## 2023-01-11 RX ORDER — METOPROLOL SUCCINATE 50 MG/1
50 TABLET, EXTENDED RELEASE ORAL DAILY
COMMUNITY

## 2023-01-15 ENCOUNTER — LAB ENCOUNTER (OUTPATIENT)
Dept: LAB | Facility: HOSPITAL | Age: 58
End: 2023-01-15
Attending: INTERNAL MEDICINE
Payer: COMMERCIAL

## 2023-01-15 DIAGNOSIS — Z01.818 PREOP EXAMINATION: ICD-10-CM

## 2023-01-15 DIAGNOSIS — Z94.1 HEART TRANSPLANT STATUS (HCC): ICD-10-CM

## 2023-01-15 DIAGNOSIS — Z11.59 SPECIAL SCREENING EXAMINATION FOR VIRAL DISEASE: ICD-10-CM

## 2023-01-15 LAB
ALBUMIN SERPL-MCNC: 3 G/DL (ref 3.4–5)
ALBUMIN/GLOB SERPL: 0.7 {RATIO} (ref 1–2)
ALP LIVER SERPL-CCNC: 180 U/L
ALT SERPL-CCNC: 26 U/L
ANION GAP SERPL CALC-SCNC: 5 MMOL/L (ref 0–18)
AST SERPL-CCNC: 29 U/L (ref 15–37)
BASOPHILS # BLD AUTO: 0.03 X10(3) UL (ref 0–0.2)
BASOPHILS NFR BLD AUTO: 0.4 %
BILIRUB SERPL-MCNC: 0.4 MG/DL (ref 0.1–2)
BUN BLD-MCNC: 33 MG/DL (ref 7–18)
CALCIUM BLD-MCNC: 9.3 MG/DL (ref 8.5–10.1)
CHLORIDE SERPL-SCNC: 108 MMOL/L (ref 98–112)
CO2 SERPL-SCNC: 26 MMOL/L (ref 21–32)
CREAT BLD-MCNC: 1.63 MG/DL
EOSINOPHIL # BLD AUTO: 0.43 X10(3) UL (ref 0–0.7)
EOSINOPHIL NFR BLD AUTO: 5.7 %
ERYTHROCYTE [DISTWIDTH] IN BLOOD BY AUTOMATED COUNT: 12.2 %
GFR SERPLBLD BASED ON 1.73 SQ M-ARVRAT: 37 ML/MIN/1.73M2 (ref 60–?)
GLOBULIN PLAS-MCNC: 4.6 G/DL (ref 2.8–4.4)
GLUCOSE BLD-MCNC: 101 MG/DL (ref 70–99)
HCT VFR BLD AUTO: 35.6 %
HGB BLD-MCNC: 11.3 G/DL
IMM GRANULOCYTES # BLD AUTO: 0.02 X10(3) UL (ref 0–1)
IMM GRANULOCYTES NFR BLD: 0.3 %
LYMPHOCYTES # BLD AUTO: 1.46 X10(3) UL (ref 1–4)
LYMPHOCYTES NFR BLD AUTO: 19.5 %
MCH RBC QN AUTO: 29.2 PG (ref 26–34)
MCHC RBC AUTO-ENTMCNC: 31.7 G/DL (ref 31–37)
MCV RBC AUTO: 92 FL
MONOCYTES # BLD AUTO: 0.82 X10(3) UL (ref 0.1–1)
MONOCYTES NFR BLD AUTO: 10.9 %
NEUTROPHILS # BLD AUTO: 4.74 X10 (3) UL (ref 1.5–7.7)
NEUTROPHILS # BLD AUTO: 4.74 X10(3) UL (ref 1.5–7.7)
NEUTROPHILS NFR BLD AUTO: 63.2 %
OSMOLALITY SERPL CALC.SUM OF ELEC: 295 MOSM/KG (ref 275–295)
PLATELET # BLD AUTO: 209 10(3)UL (ref 150–450)
POTASSIUM SERPL-SCNC: 5.6 MMOL/L (ref 3.5–5.1)
PROT SERPL-MCNC: 7.6 G/DL (ref 6.4–8.2)
RBC # BLD AUTO: 3.87 X10(6)UL
SODIUM SERPL-SCNC: 139 MMOL/L (ref 136–145)
WBC # BLD AUTO: 7.5 X10(3) UL (ref 4–11)

## 2023-01-15 PROCEDURE — 85025 COMPLETE CBC W/AUTO DIFF WBC: CPT

## 2023-01-15 PROCEDURE — 80053 COMPREHEN METABOLIC PANEL: CPT

## 2023-01-15 PROCEDURE — 36415 COLL VENOUS BLD VENIPUNCTURE: CPT

## 2023-01-16 LAB — SARS-COV-2 RNA RESP QL NAA+PROBE: NOT DETECTED

## 2023-01-18 ENCOUNTER — HOSPITAL ENCOUNTER (OUTPATIENT)
Dept: INTERVENTIONAL RADIOLOGY/VASCULAR | Facility: HOSPITAL | Age: 58
Discharge: HOME | End: 2023-01-18
Attending: INTERNAL MEDICINE | Admitting: INTERNAL MEDICINE
Payer: COMMERCIAL

## 2023-01-18 VITALS
HEIGHT: 62 IN | DIASTOLIC BLOOD PRESSURE: 68 MMHG | WEIGHT: 222 LBS | HEART RATE: 67 BPM | RESPIRATION RATE: 17 BRPM | SYSTOLIC BLOOD PRESSURE: 143 MMHG | TEMPERATURE: 97 F | OXYGEN SATURATION: 96 % | BODY MASS INDEX: 40.85 KG/M2

## 2023-01-18 DIAGNOSIS — Z94.1 HEART TRANSPLANT RECIPIENT (HCC): ICD-10-CM

## 2023-01-18 DIAGNOSIS — Z94.1 HEART TRANSPLANT STATUS (HCC): Primary | ICD-10-CM

## 2023-01-18 LAB
ISTAT ACTIVATED CLOTTING TIME: 197 SECONDS (ref 74–137)
ISTAT ACTIVATED CLOTTING TIME: 329 SECONDS (ref 74–137)

## 2023-01-18 PROCEDURE — 99153 MOD SED SAME PHYS/QHP EA: CPT | Performed by: INTERNAL MEDICINE

## 2023-01-18 PROCEDURE — B241ZZ3 ULTRASONOGRAPHY OF MULTIPLE CORONARY ARTERIES, INTRAVASCULAR: ICD-10-PCS | Performed by: INTERNAL MEDICINE

## 2023-01-18 PROCEDURE — 99152 MOD SED SAME PHYS/QHP 5/>YRS: CPT | Performed by: INTERNAL MEDICINE

## 2023-01-18 PROCEDURE — B2111ZZ FLUOROSCOPY OF MULTIPLE CORONARY ARTERIES USING LOW OSMOLAR CONTRAST: ICD-10-PCS | Performed by: INTERNAL MEDICINE

## 2023-01-18 PROCEDURE — 93456 R HRT CORONARY ARTERY ANGIO: CPT | Performed by: INTERNAL MEDICINE

## 2023-01-18 PROCEDURE — 85347 COAGULATION TIME ACTIVATED: CPT

## 2023-01-18 PROCEDURE — 4A023N6 MEASUREMENT OF CARDIAC SAMPLING AND PRESSURE, RIGHT HEART, PERCUTANEOUS APPROACH: ICD-10-PCS | Performed by: INTERNAL MEDICINE

## 2023-01-18 PROCEDURE — 92978 ENDOLUMINL IVUS OCT C 1ST: CPT | Performed by: INTERNAL MEDICINE

## 2023-01-18 PROCEDURE — 92979 ENDOLUMINL IVUS OCT C EA: CPT | Performed by: INTERNAL MEDICINE

## 2023-01-18 RX ORDER — LIDOCAINE HYDROCHLORIDE 10 MG/ML
INJECTION, SOLUTION EPIDURAL; INFILTRATION; INTRACAUDAL; PERINEURAL
Status: COMPLETED
Start: 2023-01-18 | End: 2023-01-18

## 2023-01-18 RX ORDER — SODIUM CHLORIDE 9 MG/ML
250 INJECTION, SOLUTION INTRAVENOUS ONCE
Status: COMPLETED | OUTPATIENT
Start: 2023-01-18 | End: 2023-01-18

## 2023-01-18 RX ORDER — MIDAZOLAM HYDROCHLORIDE 1 MG/ML
INJECTION INTRAMUSCULAR; INTRAVENOUS
Status: COMPLETED
Start: 2023-01-18 | End: 2023-01-18

## 2023-01-18 RX ORDER — IODIXANOL 320 MG/ML
150 INJECTION, SOLUTION INTRAVASCULAR
Status: COMPLETED | OUTPATIENT
Start: 2023-01-18 | End: 2023-01-18

## 2023-01-18 RX ORDER — BIVALIRUDIN 250 MG/5ML
INJECTION, POWDER, LYOPHILIZED, FOR SOLUTION INTRAVENOUS
Status: COMPLETED
Start: 2023-01-18 | End: 2023-01-18

## 2023-01-18 RX ADMIN — IODIXANOL 75 ML: 320 INJECTION, SOLUTION INTRAVASCULAR at 13:30:00

## 2023-01-18 RX ADMIN — SODIUM CHLORIDE 250 ML: 9 INJECTION, SOLUTION INTRAVENOUS at 10:30:00

## 2023-01-18 NOTE — PROCEDURES
[facility]    PATIENT'S NAME: Abigail Denton   ATTENDING PHYSICIAN: Coco Lockett M.D. OPERATING PHYSICIAN: Grace Wright M.D. PATIENT ACCOUNT#:   [de-identified]    LOCATION:  [address]  MEDICAL RECORD #:   [id number]       YOB: 1965  ADMISSION DATE:       01/18/2023      OPERATION DATE:  01/18/2023    CARDIAC PROCEDURE TRANSCRIPTION      CARDIAC CATHETERIZATION     PREOPERATIVE DIAGNOSIS:    1.   Status post orthotopic heart transplantation December 2016.  2.   Chronic kidney disease stage 3.  3.   Diabetes mellitus type 2.  4.   Hypercholesterolemia. 5.   Folic acid and iron-deficiency anemia. 6.   Obesity. POSTOPERATIVE DIAGNOSIS:    1.   Status post orthotopic heart transplantation December 2016.  2.   Chronic kidney disease stage 3.  3.   Diabetes mellitus type 2.  4.   Hypercholesterolemia. 5.   Folic acid and iron-deficiency anemia. 6.   Obesity. PROCEDURE PERFORMED:    1. Selective coronary angiography. 2.   Right heart catheterization. 3.   Intracoronary ultrasound of left anterior descending artery. 4.   Intracoronary ultrasound of left main coronary artery. SEDATION:  We performed moderate conscious sedation with IV Versed and IV fentanyl. The time of first sedation dose was 12:15 p.m. and procedure ended at 1:11 p.m. Blood pressure, pulse oximetry, and heart rate were monitored all the time by the nurse and myself and IV line was checked by the nurse and myself. DESCRIPTION OF PROCEDURE:  After written informed consent was obtained from the patient, she was brought to cardiac catheterization laboratory. She was prepped and draped in usual sterile fashion. Lidocaine 1% was used to infiltrate her right groin for local anesthesia and a 6-Indonesian Introducer sheath was inserted into right femoral artery using modified Seldinger technique. A 7-Indonesian Introducer sheath was inserted into right femoral vein.     Right heart catheterization was performed using pulmonary capillary wedge pressure catheter. We measured right heart pressures and calculated cardiac output using a Rubens equation on room air. We also obtained saturations from pulmonary artery, right atrium and aorta to screen for any intracardiac shunt on room air. Selective coronary angiography was performed using 6-Cameroonian FR4 diagnostic catheter for the right coronary arterial system and 6-Cameroonian JL4 diagnostic catheter for the left coronary artery. Left ventriculogram was not performed in this patient with heart transplant and kidney insufficiency for kidney safety. The patient has preserved LV systolic function by echo imaging studies. Importantly, the patient has allergy to IV heparin and for this reason IV Angiomax bolus was used for intracoronary ultrasound exam.    We performed intracoronary ultrasound exam using a [facility] ultrasound catheter to assess for any allograft vasculopathy. A 7-Cameroonian JL4 guide catheter was already in the ostium of the left main coronary artery. Coronary Runthrough wire was used for intracoronary ultrasound exam.  It was advanced to the distal LAD artery with no difficulties. Intracoronary ultrasound was advanced over the coronary wire. We performed manual pullback and measured minimal luminal areas and minimal luminal dimensions and assessed for allograft vasculopathy. Final angiography of left coronary arterial system was performed and reviewed. There was no bleeding, hematoma, thrombus, or distal embolization angiographically. IVUS exam was used to examine LAD artery and left main coronary artery allograft vasculopathy. Right femoral arterial sheathogram was performed and was acceptable for percutaneous closure using 6-Cameroonian Angio-Seal closure device. There was no bleeding or hematoma in the right groin. The right femoral venous sheath was secured to the skin, to be pulled out later in holding area.      RIGHT HEART CATHETERIZATION HEMODYNAMICS:    1. Right atrial pressure 6 mmHg. 2.   Right ventricle pressure 35/0/3 mmHg. 3.   Pulmonary artery pressure 35/12/22 mmHg. 4.   Pulmonary capillary wedge pressure 10 mmHg. 5.   Transpulmonary gradient 12 mmHg. 6.   Pulmonary vascular resistance 1.2 Wood units. 7.   Rubens cardiac output 9.9 L/min with cardiac index of 4.9 L/min/sq m. 8.   Saturations:  77% in right atrium, 76% in pulmonary artery, and 94% in aorta on room air with hemoglobin sampling of 10.3.  9. Central aortic pressure 132/64/93 mmHg with a heart rate in high 60s to low 70s. Patient was in sinus rhythm with no prognostically-significant arrhythmia. SELECTIVE CORONARY ANGIOGRAPHY:    1. Left main coronary artery had no disease angiographically. 2.   LAD artery and major diagonal branches had no disease angiographically. 3.   Left circumflex artery and major OM branches had no disease angiographically. 4.   Right coronary artery was a dominant system with no disease angiographically. 5.   Right posterior descending artery and posterolateral branches had no disease angiographically. INTRACORONARY ULTRASOUND:    1.   Mid LAD artery had no intimal hyperplasia by intracoronary ultrasound with vessel area of 8.3 sq mm and vessel dimension of 3.0 x 2.1 mm.    2.   Proximal LAD artery had minimal intimal hyperplasia, almost circumferential, with vessel area of 16.1 sq mm and vessel dimension of 4.3 x 4.2 mm. There was small superficial calcification in the ostium of LAD artery which was nonsignificant and was extending to the distal left main with no obstruction. It was opposite to circumflex ostium. 3.   Left main coronary artery had mild eccentric plaque calcified at the end of left main, close to the ostium of LAD. It was nonsignificant. The left main coronary artery otherwise looked normal by intracoronary ultrasound with no intimal hyperplasia.   The left main vessel area was 25.1 sq mm and vessel dimension was 5.6 x 5.5 mm. Minimal luminal area at the level of the plaque was 19.9 sq mm. IMPRESSION:    1. Normal coronary arteries angiographically. 2.   Right coronary arterial system. 3.   Intracoronary ultrasound revealed minimal to mild and almost circumferential intimal hyperplasia in proximal LAD and very small superficial calcification in the ostium of LAD artery, but nothing significant. 4.   Otherwise, no intimal hyperplasia in left main coronary artery or remaining segments of LAD artery. 5.   No left ventriculogram was performed due to kidney insufficiency and heart transplant patient for kidney safety. 6.   Right heart catheterization hemodynamics were unremarkable. Patient received 250 mL of normal saline bolus prior to catheterization and was euvolemic after the bolus. The bolus was given for kidney insufficiency and then will continue IV fluids at 100 mL/h until discharge time later today. 7.   Importantly, patient has allergy to heparin and we used IV Angiomax bolus for intracoronary ultrasound exam.  8.   Hemostasis of the right femoral artery was achieved with 6-Honduran Angio-Seal closure device. PLAN:    1. Medical management. 2.   IV hydration for kidney insufficiency in a cardiac transplant patient. 3.   Check ACT in holding area in 45 minutes and pull the right femoral venous sheath out if ACT less than 180 seconds. 4.   Anti-rejection medications per Dr. Young Dixon. 5.   Follow up with referring, Dr. Radha Daugherty, within the next few weeks. Thank you for allowing me to participate in this patient's care. Should you have any questions, feel free to contact me. All was discussed with the patient and the nursing staff in detail. Dictated By Joy Gordillo M.D.  d: 01/18/2023 13:33:58  t: 01/18/2023 14:17:42  Job [id number]  [place]/    cc: JOSLYN [address].

## 2023-01-18 NOTE — PROGRESS NOTES
Pt s/p [facility] with Dr. Carmen Hawthorne. Pt recovered in holding. Right groin dressing cdi, no bleeding or hematoma. Venous sheath in place. Once ACT at normal level, sheath was pulled and pressure held for 12 minutes. No bleeding or hematoma. +pedals. Pt flat after for 2 hours then HOB elevated. Pt ate and drank. Dr. Carmen Hawthorne came and spoke to pt and sister. Discharge instructions reviewed with pt and sister. Copy given. After recovery, pt ambulated to bathroom and room with RN with no complaints. Right groin dressing remained cdi, site soft. IV removed. Pt dressed. Pt discharged to [facility] via wheelchair by volunteer. Pt left with belongings.  Pt sister drove pt home

## 2023-01-18 NOTE — H&P
[address]       Please see scanned recent H&P from [address] office. History reviewed and up to date. No changes to H&P. The patient is scheduled for RHC, LHC and intracoronary ultrasound of LMCA and LAD artery to assess for allograft vasculopathy in transplanted heart as a part of annual check in heart transplant patient. Pt has alergy to heparin gtt and will use IV angiomax for IVUS in cath lab. Patient was consented. The risks and benefits of the procedure were explained to the patient. 1-2% risk of coronary angiography, possible angioplasty, include, but are not limited to stroke, death, heart attack, coronary dissection requiring emergent CABG, hematoma, bleeding, allergic reaction to medications, vascular damage requiring surgical repair, kidney failure requiring dialysis and others. Patient wishes to proceed. D/w pt and the Nurses    Fouzia Dawson M.D., F.A.C.C.   Interventional Cardiology  Advanced Heart Failure  [address] [place] [address]    01/18/23

## 2023-01-19 ENCOUNTER — LAB ENCOUNTER (OUTPATIENT)
Dept: LAB | Age: 58
End: 2023-01-19
Attending: INTERNAL MEDICINE
Payer: COMMERCIAL

## 2023-01-19 DIAGNOSIS — D64.9 ANEMIA: Primary | ICD-10-CM

## 2023-01-19 DIAGNOSIS — T86.20: ICD-10-CM

## 2023-01-19 DIAGNOSIS — E03.9 HYPOTHYROID: ICD-10-CM

## 2023-01-19 DIAGNOSIS — R80.9 PROTEINURIA: ICD-10-CM

## 2023-01-19 DIAGNOSIS — D84.821 IMMUNODEFICIENCY DUE TO TREATMENT WITH IMMUNOSUPPRESSIVE MEDICATION (HCC): ICD-10-CM

## 2023-01-19 DIAGNOSIS — Z79.899 IMMUNODEFICIENCY DUE TO TREATMENT WITH IMMUNOSUPPRESSIVE MEDICATION (HCC): ICD-10-CM

## 2023-01-19 DIAGNOSIS — R79.89 HYPOURICEMIA: ICD-10-CM

## 2023-01-19 DIAGNOSIS — N18.9 CKD (CHRONIC KIDNEY DISEASE): ICD-10-CM

## 2023-01-19 DIAGNOSIS — E78.5 HYPERLIPIDEMIA: ICD-10-CM

## 2023-01-19 LAB
ALBUMIN SERPL-MCNC: 3 G/DL (ref 3.4–5)
ALBUMIN/GLOB SERPL: 0.7 {RATIO} (ref 1–2)
ALP LIVER SERPL-CCNC: 171 U/L
ALT SERPL-CCNC: 34 U/L
ANION GAP SERPL CALC-SCNC: 4 MMOL/L (ref 0–18)
AST SERPL-CCNC: 38 U/L (ref 15–37)
BASOPHILS # BLD AUTO: 0.03 X10(3) UL (ref 0–0.2)
BASOPHILS NFR BLD AUTO: 0.4 %
BILIRUB SERPL-MCNC: 0.3 MG/DL (ref 0.1–2)
BUN BLD-MCNC: 40 MG/DL (ref 7–18)
BUN/CREAT SERPL: 23.5 (ref 10–20)
CALCIUM BLD-MCNC: 8.8 MG/DL (ref 8.5–10.1)
CHLORIDE SERPL-SCNC: 112 MMOL/L (ref 98–112)
CHOLEST SERPL-MCNC: 116 MG/DL (ref ?–200)
CO2 SERPL-SCNC: 26 MMOL/L (ref 21–32)
CREAT BLD-MCNC: 1.7 MG/DL
DEPRECATED RDW RBC AUTO: 42.9 FL (ref 35.1–46.3)
EOSINOPHIL # BLD AUTO: 0.32 X10(3) UL (ref 0–0.7)
EOSINOPHIL NFR BLD AUTO: 4.3 %
ERYTHROCYTE [DISTWIDTH] IN BLOOD BY AUTOMATED COUNT: 12.3 % (ref 11–15)
EST. AVERAGE GLUCOSE BLD GHB EST-MCNC: 100 MG/DL (ref 68–126)
FASTING PATIENT LIPID ANSWER: YES
FASTING STATUS PATIENT QL REPORTED: YES
GFR SERPLBLD BASED ON 1.73 SQ M-ARVRAT: 35 ML/MIN/1.73M2 (ref 60–?)
GLOBULIN PLAS-MCNC: 4.2 G/DL (ref 2.8–4.4)
GLUCOSE BLD-MCNC: 107 MG/DL (ref 70–99)
HBA1C MFR BLD: 5.1 % (ref ?–5.7)
HCT VFR BLD AUTO: 33.5 %
HDLC SERPL-MCNC: 56 MG/DL (ref 40–59)
HGB BLD-MCNC: 10.3 G/DL
IMM GRANULOCYTES # BLD AUTO: 0.02 X10(3) UL (ref 0–1)
IMM GRANULOCYTES NFR BLD: 0.3 %
LDLC SERPL CALC-MCNC: 45 MG/DL (ref ?–100)
LYMPHOCYTES # BLD AUTO: 1.12 X10(3) UL (ref 1–4)
LYMPHOCYTES NFR BLD AUTO: 15.1 %
MCH RBC QN AUTO: 29.1 PG (ref 26–34)
MCHC RBC AUTO-ENTMCNC: 30.7 G/DL (ref 31–37)
MCV RBC AUTO: 94.6 FL
MONOCYTES # BLD AUTO: 0.85 X10(3) UL (ref 0.1–1)
MONOCYTES NFR BLD AUTO: 11.4 %
NEUTROPHILS # BLD AUTO: 5.09 X10 (3) UL (ref 1.5–7.7)
NEUTROPHILS # BLD AUTO: 5.09 X10(3) UL (ref 1.5–7.7)
NEUTROPHILS NFR BLD AUTO: 68.5 %
NONHDLC SERPL-MCNC: 60 MG/DL (ref ?–130)
NT-PROBNP SERPL-MCNC: 2046 PG/ML (ref ?–125)
OSMOLALITY SERPL CALC.SUM OF ELEC: 304 MOSM/KG (ref 275–295)
PLATELET # BLD AUTO: 199 10(3)UL (ref 150–450)
POTASSIUM SERPL-SCNC: 5.8 MMOL/L (ref 3.5–5.1)
PROT SERPL-MCNC: 7.2 G/DL (ref 6.4–8.2)
RBC # BLD AUTO: 3.54 X10(6)UL
SODIUM SERPL-SCNC: 142 MMOL/L (ref 136–145)
TRIGL SERPL-MCNC: 71 MG/DL (ref 30–149)
TSI SER-ACNC: 1.42 MIU/ML (ref 0.36–3.74)
VIT D+METAB SERPL-MCNC: 53.6 NG/ML (ref 30–100)
VLDLC SERPL CALC-MCNC: 10 MG/DL (ref 0–30)
WBC # BLD AUTO: 7.4 X10(3) UL (ref 4–11)

## 2023-01-19 PROCEDURE — 80061 LIPID PANEL: CPT

## 2023-01-19 PROCEDURE — 84443 ASSAY THYROID STIM HORMONE: CPT

## 2023-01-19 PROCEDURE — 82306 VITAMIN D 25 HYDROXY: CPT

## 2023-01-19 PROCEDURE — 83036 HEMOGLOBIN GLYCOSYLATED A1C: CPT

## 2023-01-19 PROCEDURE — 85025 COMPLETE CBC W/AUTO DIFF WBC: CPT

## 2023-01-19 PROCEDURE — 80180 DRUG SCRN QUAN MYCOPHENOLATE: CPT

## 2023-01-19 PROCEDURE — 36415 COLL VENOUS BLD VENIPUNCTURE: CPT

## 2023-01-19 PROCEDURE — 83880 ASSAY OF NATRIURETIC PEPTIDE: CPT

## 2023-01-19 PROCEDURE — 80053 COMPREHEN METABOLIC PANEL: CPT

## 2023-01-19 PROCEDURE — 80197 ASSAY OF TACROLIMUS: CPT

## 2023-01-21 LAB — TACROLIMUS: 7.4 NG/ML

## 2023-01-22 LAB
MYCOPHENOLIC ACID GLUCURONIDE: 34.2 UG/ML
MYCOPHENOLIC ACID: 0.5 UG/ML

## 2023-06-05 ENCOUNTER — LAB ENCOUNTER (OUTPATIENT)
Dept: LAB | Facility: HOSPITAL | Age: 58
End: 2023-06-05
Attending: INTERNAL MEDICINE
Payer: COMMERCIAL

## 2023-06-05 DIAGNOSIS — R79.89 ELEVATED BRAIN NATRIURETIC PEPTIDE (BNP) LEVEL: ICD-10-CM

## 2023-06-05 DIAGNOSIS — N18.30 CKD (CHRONIC KIDNEY DISEASE) STAGE 3, GFR 30-59 ML/MIN (HCC): Primary | ICD-10-CM

## 2023-06-05 DIAGNOSIS — Z79.01 ADMISSION FOR LONG-TERM (CURRENT) USE OF ANTICOAGULANTS: ICD-10-CM

## 2023-06-05 DIAGNOSIS — D84.821 IMMUNOSUPPRESSION DUE TO DRUG THERAPY (HCC): ICD-10-CM

## 2023-06-05 DIAGNOSIS — Z48.21 FOLLOW-UP EXAMINATION AFTER HEART TRANSPLANT (HCC): ICD-10-CM

## 2023-06-05 DIAGNOSIS — Z51.81 ADMISSION FOR LONG-TERM (CURRENT) USE OF ANTICOAGULANTS: ICD-10-CM

## 2023-06-05 DIAGNOSIS — Z94.1 H/O HEART TRANSPLANT (HCC): ICD-10-CM

## 2023-06-05 DIAGNOSIS — Z94.1 HEART TRANSPLANT RECIPIENT (HCC): ICD-10-CM

## 2023-06-05 DIAGNOSIS — Z79.899 IMMUNOSUPPRESSION DUE TO DRUG THERAPY (HCC): ICD-10-CM

## 2023-06-05 DIAGNOSIS — E03.9 HYPOTHYROID: ICD-10-CM

## 2023-06-05 LAB
ALBUMIN SERPL-MCNC: 3.1 G/DL (ref 3.4–5)
ALBUMIN/GLOB SERPL: 0.7 {RATIO} (ref 1–2)
ALP LIVER SERPL-CCNC: 142 U/L
ALT SERPL-CCNC: 24 U/L
ANION GAP SERPL CALC-SCNC: 0 MMOL/L (ref 0–18)
AST SERPL-CCNC: 28 U/L (ref 15–37)
BASOPHILS # BLD AUTO: 0.02 X10(3) UL (ref 0–0.2)
BASOPHILS NFR BLD AUTO: 0.3 %
BILIRUB SERPL-MCNC: 0.3 MG/DL (ref 0.1–2)
BUN BLD-MCNC: 32 MG/DL (ref 7–18)
CALCIUM BLD-MCNC: 9 MG/DL (ref 8.5–10.1)
CHLORIDE SERPL-SCNC: 114 MMOL/L (ref 98–112)
CHOLEST SERPL-MCNC: 118 MG/DL (ref ?–200)
CO2 SERPL-SCNC: 26 MMOL/L (ref 21–32)
CREAT BLD-MCNC: 1.61 MG/DL
EOSINOPHIL # BLD AUTO: 0.38 X10(3) UL (ref 0–0.7)
EOSINOPHIL NFR BLD AUTO: 5.5 %
ERYTHROCYTE [DISTWIDTH] IN BLOOD BY AUTOMATED COUNT: 12.5 %
EST. AVERAGE GLUCOSE BLD GHB EST-MCNC: 105 MG/DL (ref 68–126)
FASTING PATIENT LIPID ANSWER: YES
FASTING STATUS PATIENT QL REPORTED: YES
GFR SERPLBLD BASED ON 1.73 SQ M-ARVRAT: 37 ML/MIN/1.73M2 (ref 60–?)
GLOBULIN PLAS-MCNC: 4.4 G/DL (ref 2.8–4.4)
GLUCOSE BLD-MCNC: 106 MG/DL (ref 70–99)
HBA1C MFR BLD: 5.3 % (ref ?–5.7)
HCT VFR BLD AUTO: 37.5 %
HDLC SERPL-MCNC: 55 MG/DL (ref 40–59)
HGB BLD-MCNC: 11.5 G/DL
IMM GRANULOCYTES # BLD AUTO: 0.03 X10(3) UL (ref 0–1)
IMM GRANULOCYTES NFR BLD: 0.4 %
LDLC SERPL CALC-MCNC: 48 MG/DL (ref ?–100)
LYMPHOCYTES # BLD AUTO: 1.16 X10(3) UL (ref 1–4)
LYMPHOCYTES NFR BLD AUTO: 16.9 %
MCH RBC QN AUTO: 28 PG (ref 26–34)
MCHC RBC AUTO-ENTMCNC: 30.7 G/DL (ref 31–37)
MCV RBC AUTO: 91.5 FL
MONOCYTES # BLD AUTO: 0.71 X10(3) UL (ref 0.1–1)
MONOCYTES NFR BLD AUTO: 10.4 %
NEUTROPHILS # BLD AUTO: 4.55 X10 (3) UL (ref 1.5–7.7)
NEUTROPHILS # BLD AUTO: 4.55 X10(3) UL (ref 1.5–7.7)
NEUTROPHILS NFR BLD AUTO: 66.5 %
NONHDLC SERPL-MCNC: 63 MG/DL (ref ?–130)
NT-PROBNP SERPL-MCNC: 1800 PG/ML (ref ?–125)
OSMOLALITY SERPL CALC.SUM OF ELEC: 297 MOSM/KG (ref 275–295)
PLATELET # BLD AUTO: 219 10(3)UL (ref 150–450)
POTASSIUM SERPL-SCNC: 5.1 MMOL/L (ref 3.5–5.1)
PROT SERPL-MCNC: 7.5 G/DL (ref 6.4–8.2)
RBC # BLD AUTO: 4.1 X10(6)UL
SODIUM SERPL-SCNC: 140 MMOL/L (ref 136–145)
T4 FREE SERPL-MCNC: 1.3 NG/DL (ref 0.8–1.7)
TRIGL SERPL-MCNC: 71 MG/DL (ref 30–149)
TSI SER-ACNC: 2.93 MIU/ML (ref 0.36–3.74)
VIT D+METAB SERPL-MCNC: 51 NG/ML (ref 30–100)
VLDLC SERPL CALC-MCNC: 10 MG/DL (ref 0–30)
WBC # BLD AUTO: 6.9 X10(3) UL (ref 4–11)

## 2023-06-05 PROCEDURE — 80197 ASSAY OF TACROLIMUS: CPT

## 2023-06-05 PROCEDURE — 84439 ASSAY OF FREE THYROXINE: CPT

## 2023-06-05 PROCEDURE — 82306 VITAMIN D 25 HYDROXY: CPT

## 2023-06-05 PROCEDURE — 80053 COMPREHEN METABOLIC PANEL: CPT

## 2023-06-05 PROCEDURE — 36415 COLL VENOUS BLD VENIPUNCTURE: CPT

## 2023-06-05 PROCEDURE — 83036 HEMOGLOBIN GLYCOSYLATED A1C: CPT

## 2023-06-05 PROCEDURE — 84443 ASSAY THYROID STIM HORMONE: CPT

## 2023-06-05 PROCEDURE — 80180 DRUG SCRN QUAN MYCOPHENOLATE: CPT

## 2023-06-05 PROCEDURE — 85025 COMPLETE CBC W/AUTO DIFF WBC: CPT

## 2023-06-05 PROCEDURE — 83880 ASSAY OF NATRIURETIC PEPTIDE: CPT

## 2023-06-05 PROCEDURE — 80061 LIPID PANEL: CPT

## 2023-06-07 LAB — TACROLIMUS LVL: 8.1 NG/ML

## 2023-06-13 LAB
MYCOPHENOLIC ACID GLUCURONIDE: 54 UG/ML
MYCOPHENOLIC ACID: 1.2 UG/ML

## 2023-10-03 ENCOUNTER — LAB ENCOUNTER (OUTPATIENT)
Dept: LAB | Facility: HOSPITAL | Age: 58
End: 2023-10-03
Attending: INTERNAL MEDICINE
Payer: COMMERCIAL

## 2023-10-03 DIAGNOSIS — N18.30 CHRONIC KIDNEY DISEASE, STAGE III (MODERATE) (HCC): Primary | ICD-10-CM

## 2023-10-03 DIAGNOSIS — I51.9 MYXEDEMA HEART DISEASE: ICD-10-CM

## 2023-10-03 DIAGNOSIS — R53.83 FATIGUE: ICD-10-CM

## 2023-10-03 DIAGNOSIS — E78.2 MIXED HYPERLIPIDEMIA: ICD-10-CM

## 2023-10-03 DIAGNOSIS — Z51.81 ENCOUNTER FOR THERAPEUTIC DRUG MONITORING: ICD-10-CM

## 2023-10-03 DIAGNOSIS — E03.9 MYXEDEMA HEART DISEASE: ICD-10-CM

## 2023-10-03 DIAGNOSIS — R79.89 HYPOURICEMIA: ICD-10-CM

## 2023-10-03 DIAGNOSIS — Z48.21 ENCOUNTER FOR AFTERCARE FOLLOWING HEART TRANSPLANT (HCC): ICD-10-CM

## 2023-10-03 DIAGNOSIS — Z94.1 HEART REPLACED BY TRANSPLANT (HCC): ICD-10-CM

## 2023-10-03 DIAGNOSIS — E66.01 MORBID OBESITY (HCC): ICD-10-CM

## 2023-10-03 LAB
ALBUMIN SERPL-MCNC: 2.8 G/DL (ref 3.4–5)
ALBUMIN/GLOB SERPL: 0.6 {RATIO} (ref 1–2)
ALP LIVER SERPL-CCNC: 148 U/L
ALT SERPL-CCNC: 28 U/L
ANION GAP SERPL CALC-SCNC: 5 MMOL/L (ref 0–18)
AST SERPL-CCNC: 30 U/L (ref 15–37)
BASOPHILS # BLD AUTO: 0.02 X10(3) UL (ref 0–0.2)
BASOPHILS NFR BLD AUTO: 0.4 %
BILIRUB SERPL-MCNC: 0.5 MG/DL (ref 0.1–2)
BUN BLD-MCNC: 33 MG/DL (ref 7–18)
CALCIUM BLD-MCNC: 8.6 MG/DL (ref 8.5–10.1)
CHLORIDE SERPL-SCNC: 110 MMOL/L (ref 98–112)
CHOLEST SERPL-MCNC: 111 MG/DL (ref ?–200)
CO2 SERPL-SCNC: 24 MMOL/L (ref 21–32)
CREAT BLD-MCNC: 1.86 MG/DL
EGFRCR SERPLBLD CKD-EPI 2021: 31 ML/MIN/1.73M2 (ref 60–?)
EOSINOPHIL # BLD AUTO: 0.41 X10(3) UL (ref 0–0.7)
EOSINOPHIL NFR BLD AUTO: 7.3 %
ERYTHROCYTE [DISTWIDTH] IN BLOOD BY AUTOMATED COUNT: 12.8 %
EST. AVERAGE GLUCOSE BLD GHB EST-MCNC: 114 MG/DL (ref 68–126)
FASTING PATIENT LIPID ANSWER: YES
FASTING STATUS PATIENT QL REPORTED: YES
GLOBULIN PLAS-MCNC: 4.4 G/DL (ref 2.8–4.4)
GLUCOSE BLD-MCNC: 95 MG/DL (ref 70–99)
HBA1C MFR BLD: 5.6 % (ref ?–5.7)
HCT VFR BLD AUTO: 32.3 %
HDLC SERPL-MCNC: 47 MG/DL (ref 40–59)
HGB BLD-MCNC: 10.3 G/DL
IMM GRANULOCYTES # BLD AUTO: 0.03 X10(3) UL (ref 0–1)
IMM GRANULOCYTES NFR BLD: 0.5 %
LDLC SERPL CALC-MCNC: 48 MG/DL (ref ?–100)
LYMPHOCYTES # BLD AUTO: 1.11 X10(3) UL (ref 1–4)
LYMPHOCYTES NFR BLD AUTO: 19.9 %
MCH RBC QN AUTO: 28.6 PG (ref 26–34)
MCHC RBC AUTO-ENTMCNC: 31.9 G/DL (ref 31–37)
MCV RBC AUTO: 89.7 FL
MONOCYTES # BLD AUTO: 0.58 X10(3) UL (ref 0.1–1)
MONOCYTES NFR BLD AUTO: 10.4 %
NEUTROPHILS # BLD AUTO: 3.44 X10 (3) UL (ref 1.5–7.7)
NEUTROPHILS # BLD AUTO: 3.44 X10(3) UL (ref 1.5–7.7)
NEUTROPHILS NFR BLD AUTO: 61.5 %
NONHDLC SERPL-MCNC: 64 MG/DL (ref ?–130)
NT-PROBNP SERPL-MCNC: 2283 PG/ML (ref ?–125)
OSMOLALITY SERPL CALC.SUM OF ELEC: 295 MOSM/KG (ref 275–295)
PLATELET # BLD AUTO: 209 10(3)UL (ref 150–450)
POTASSIUM SERPL-SCNC: 4.8 MMOL/L (ref 3.5–5.1)
PROT SERPL-MCNC: 7.2 G/DL (ref 6.4–8.2)
RBC # BLD AUTO: 3.6 X10(6)UL
SODIUM SERPL-SCNC: 139 MMOL/L (ref 136–145)
TRIGL SERPL-MCNC: 81 MG/DL (ref 30–149)
TSI SER-ACNC: 1.77 MIU/ML (ref 0.36–3.74)
VIT D+METAB SERPL-MCNC: 40.6 NG/ML (ref 30–100)
VLDLC SERPL CALC-MCNC: 11 MG/DL (ref 0–30)
WBC # BLD AUTO: 5.6 X10(3) UL (ref 4–11)

## 2023-10-03 PROCEDURE — 80053 COMPREHEN METABOLIC PANEL: CPT

## 2023-10-03 PROCEDURE — 84443 ASSAY THYROID STIM HORMONE: CPT

## 2023-10-03 PROCEDURE — 83036 HEMOGLOBIN GLYCOSYLATED A1C: CPT

## 2023-10-03 PROCEDURE — 80197 ASSAY OF TACROLIMUS: CPT

## 2023-10-03 PROCEDURE — 82306 VITAMIN D 25 HYDROXY: CPT

## 2023-10-03 PROCEDURE — 36415 COLL VENOUS BLD VENIPUNCTURE: CPT

## 2023-10-03 PROCEDURE — 85025 COMPLETE CBC W/AUTO DIFF WBC: CPT

## 2023-10-03 PROCEDURE — 83880 ASSAY OF NATRIURETIC PEPTIDE: CPT

## 2023-10-03 PROCEDURE — 80180 DRUG SCRN QUAN MYCOPHENOLATE: CPT

## 2023-10-03 PROCEDURE — 80061 LIPID PANEL: CPT

## 2023-10-04 LAB
MYCOPHENOLIC ACID GLUCURONIDE: 56 MCG/ML
MYCOPHENOLIC ACID: 1.3 MCG/ML

## 2023-10-05 LAB — TACROLIMUS LVL: 7.3 NG/ML

## 2023-11-28 ENCOUNTER — ORDER TRANSCRIPTION (OUTPATIENT)
Dept: ADMINISTRATIVE | Facility: HOSPITAL | Age: 58
End: 2023-11-28

## 2023-11-28 DIAGNOSIS — Z94.1 HEART REPLACED BY TRANSPLANT (HCC): ICD-10-CM

## 2023-11-28 DIAGNOSIS — Z48.21 FOLLOW-UP EXAMINATION AFTER HEART TRANSPLANT (HCC): ICD-10-CM

## 2023-11-28 DIAGNOSIS — Z94.1 HEART TRANSPLANT RECIPIENT (HCC): ICD-10-CM

## 2023-11-28 DIAGNOSIS — Z94.1 H/O HEART TRANSPLANT (HCC): Primary | ICD-10-CM

## 2023-12-08 ENCOUNTER — APPOINTMENT (OUTPATIENT)
Dept: RESPIRATORY THERAPY | Facility: HOSPITAL | Age: 58
End: 2023-12-08
Attending: NURSE PRACTITIONER
Payer: COMMERCIAL

## 2023-12-08 ENCOUNTER — HOSPITAL ENCOUNTER (OUTPATIENT)
Dept: CV DIAGNOSTICS | Facility: HOSPITAL | Age: 58
Discharge: HOME | End: 2023-12-08
Attending: NURSE PRACTITIONER
Payer: COMMERCIAL

## 2023-12-08 DIAGNOSIS — Z94.1 HEART TRANSPLANT RECIPIENT (HCC): ICD-10-CM

## 2023-12-08 DIAGNOSIS — Z94.1 H/O HEART TRANSPLANT (HCC): ICD-10-CM

## 2023-12-08 DIAGNOSIS — Z94.1 HEART REPLACED BY TRANSPLANT (HCC): ICD-10-CM

## 2023-12-08 PROCEDURE — 93350 STRESS TTE ONLY: CPT | Performed by: NURSE PRACTITIONER

## 2023-12-08 PROCEDURE — 94726 PLETHYSMOGRAPHY LUNG VOLUMES: CPT | Performed by: INTERNAL MEDICINE

## 2023-12-08 PROCEDURE — 93017 CV STRESS TEST TRACING ONLY: CPT | Performed by: NURSE PRACTITIONER

## 2023-12-08 PROCEDURE — 94010 BREATHING CAPACITY TEST: CPT | Performed by: INTERNAL MEDICINE

## 2023-12-08 PROCEDURE — 93018 CV STRESS TEST I&R ONLY: CPT | Performed by: NURSE PRACTITIONER

## 2023-12-08 PROCEDURE — 94729 DIFFUSING CAPACITY: CPT | Performed by: INTERNAL MEDICINE

## 2023-12-08 RX ORDER — DOBUTAMINE HYDROCHLORIDE 200 MG/100ML
INJECTION INTRAVENOUS
Status: COMPLETED
Start: 2023-12-08 | End: 2023-12-08

## 2023-12-08 RX ADMIN — DOBUTAMINE HYDROCHLORIDE 250 MG: 200 INJECTION INTRAVENOUS at 11:00:00

## 2023-12-28 ENCOUNTER — HOSPITAL ENCOUNTER (OUTPATIENT)
Dept: GENERAL RADIOLOGY | Age: 58
Discharge: HOME | End: 2023-12-28
Attending: NURSE PRACTITIONER
Payer: COMMERCIAL

## 2023-12-28 ENCOUNTER — HOSPITAL ENCOUNTER (OUTPATIENT)
Dept: MAMMOGRAPHY | Age: 58
Discharge: HOME | End: 2023-12-28
Attending: NURSE PRACTITIONER
Payer: COMMERCIAL

## 2023-12-28 ENCOUNTER — HOSPITAL ENCOUNTER (OUTPATIENT)
Dept: BONE DENSITY | Age: 58
Discharge: HOME | End: 2023-12-28
Attending: NURSE PRACTITIONER
Payer: COMMERCIAL

## 2023-12-28 DIAGNOSIS — Z94.1 H/O HEART TRANSPLANT (HCC): ICD-10-CM

## 2023-12-28 DIAGNOSIS — Z94.1 HEART REPLACED BY TRANSPLANT (HCC): ICD-10-CM

## 2023-12-28 DIAGNOSIS — Z94.1 HEART TRANSPLANT RECIPIENT (HCC): ICD-10-CM

## 2023-12-28 DIAGNOSIS — Z48.21 FOLLOW-UP EXAMINATION AFTER HEART TRANSPLANT (HCC): ICD-10-CM

## 2023-12-28 PROCEDURE — 77067 SCR MAMMO BI INCL CAD: CPT | Performed by: NURSE PRACTITIONER

## 2023-12-28 PROCEDURE — 77063 BREAST TOMOSYNTHESIS BI: CPT | Performed by: NURSE PRACTITIONER

## 2023-12-28 PROCEDURE — 77080 DXA BONE DENSITY AXIAL: CPT | Performed by: NURSE PRACTITIONER

## 2023-12-28 PROCEDURE — 71046 X-RAY EXAM CHEST 2 VIEWS: CPT | Performed by: NURSE PRACTITIONER

## 2024-01-10 NOTE — PROCEDURES
Patient is a 58-year-old female being assessed with a diagnosis of heart transplant.    Results  FEV1 1.39 L reduced 59% of predicted  FVC 1.65 L reduced 55% of predicted  Ratio is normal at 84%  MVV is mildly reduced 64% of predicted  Flow volume loop with a restrictive type pattern    Total lung capacity 3.51 L mildly reduced 76% of predicted  Residual volume 1.86 L normal at 104% of predicted  Diffusion capacity is reduced 60% of predicted though corrects to the normal range 84% of predicted when alveolar volume is considered    Impression no evidence of airways obstruction.  There is a mild restrictive defect and associated reduced DLCO as can be seen with interstitial lung disease with clinical correlation suggested.    When compared to a study from 12/30/2022, there is a mild decrease in FEV1 and FVC remaining without evidence of obstruction.  There has been a 250 cc decrease in total lung capacity(prior 3.75 81% of predicted).  There is been a minimal decrease in DLCO(had been 64% of predicted though corrected to 84% of predicted).  Of note patient's ERV is decreased from 43% of predicted to 33% of predicted as can be related to weight gain as noted.

## 2024-02-09 NOTE — PAT NURSING NOTE
Per PAT encounter/MyChart message sent to pt:    Preprocedure Instructions     Visitor Instructions     Adult Patients: 2 Adult Care Partners can accompany the patient day of procedure. 2 Care Partners may visit [phone] during inpatient stay     You are scheduled for: a Cardiac Procedure     Date of Procedure: 02/14/24 Wednesday     Diet Instructions: Do not eat or drink anything after midnight     Medications: Take Aspirin 81 mg x 4 tablets the day of your procedure, Medications you are allowed to take can be taken with a sip of water the morning of your procedure     Medications to Stop: Hold herbal supplements, minerals, and vitamins, on the morning of the procedure.     Skin Prep: Shower with antibacterial soap using a clean washcloth, prior to procedure     Arrival Time: You will receive a call the afternoon (Tuesday) before your procedure after 3 pm on what time you should arrive the day of your procedure    Driving After Procedure: If sedation is given, you WILL NOT be able to drive home. You will need a responsible adult  to drive you home., Cannot take uber or cab unless approved by physician     Discharge Teaching: Your nurse will give you specific instructions before discharge, Most people can resume normal activities in 2-3 days, Any questions, please call the physician's office      parking is available starting at 6 am or park in the [place] parking garage at [facility]. Check in at the [facility] reception desk. Our  will be there to check you in for your procedure. Please bring your insurance cards and ID with you.                                                                                                                                      Please DO NOT respond to this message, the inbasket is not monitored for messages. For any questions, please call the physician's office.    Of note: At 1118, discussed with Louise LIVE at [facility] if need for updated  labwork to be drawn; last done 1/10/24. Louise stated do not need to repeat labwork. Thanked her for the assistance.

## 2024-02-13 ENCOUNTER — LAB ENCOUNTER (OUTPATIENT)
Dept: LAB | Facility: HOSPITAL | Age: 59
End: 2024-02-13
Attending: INTERNAL MEDICINE
Payer: COMMERCIAL

## 2024-02-13 DIAGNOSIS — N18.30 CKD (CHRONIC KIDNEY DISEASE) STAGE 3, GFR 30-59 ML/MIN (HCC): ICD-10-CM

## 2024-02-13 DIAGNOSIS — I48.91 ATRIAL FIBRILLATION (HCC): Primary | ICD-10-CM

## 2024-02-13 DIAGNOSIS — I48.91 ATRIAL FIBRILLATION (HCC): ICD-10-CM

## 2024-02-13 LAB
ANION GAP SERPL CALC-SCNC: 3 MMOL/L (ref 0–18)
ATRIAL RATE: 63 BPM
BASOPHILS # BLD AUTO: 0.02 X10(3) UL (ref 0–0.2)
BASOPHILS NFR BLD AUTO: 0.3 %
BUN BLD-MCNC: 33 MG/DL (ref 9–23)
CALCIUM BLD-MCNC: 9.3 MG/DL (ref 8.5–10.1)
CHLORIDE SERPL-SCNC: 116 MMOL/L (ref 98–112)
CO2 SERPL-SCNC: 26 MMOL/L (ref 21–32)
CREAT BLD-MCNC: 1.56 MG/DL
EGFRCR SERPLBLD CKD-EPI 2021: 38 ML/MIN/1.73M2 (ref 60–?)
EOSINOPHIL # BLD AUTO: 0.29 X10(3) UL (ref 0–0.7)
EOSINOPHIL NFR BLD AUTO: 4.8 %
ERYTHROCYTE [DISTWIDTH] IN BLOOD BY AUTOMATED COUNT: 13 %
FASTING STATUS PATIENT QL REPORTED: YES
GLUCOSE BLD-MCNC: 97 MG/DL (ref 70–99)
HCT VFR BLD AUTO: 35.1 %
HGB BLD-MCNC: 11.1 G/DL
IMM GRANULOCYTES # BLD AUTO: 0.01 X10(3) UL (ref 0–1)
IMM GRANULOCYTES NFR BLD: 0.2 %
LYMPHOCYTES # BLD AUTO: 0.93 X10(3) UL (ref 1–4)
LYMPHOCYTES NFR BLD AUTO: 15.2 %
MCH RBC QN AUTO: 28.5 PG (ref 26–34)
MCHC RBC AUTO-ENTMCNC: 31.6 G/DL (ref 31–37)
MCV RBC AUTO: 90 FL
MONOCYTES # BLD AUTO: 0.7 X10(3) UL (ref 0.1–1)
MONOCYTES NFR BLD AUTO: 11.5 %
NEUTROPHILS # BLD AUTO: 4.15 X10 (3) UL (ref 1.5–7.7)
NEUTROPHILS # BLD AUTO: 4.15 X10(3) UL (ref 1.5–7.7)
NEUTROPHILS NFR BLD AUTO: 68 %
OSMOLALITY SERPL CALC.SUM OF ELEC: 307 MOSM/KG (ref 275–295)
P AXIS: 37 DEGREES
P-R INTERVAL: 182 MS
PLATELET # BLD AUTO: 183 10(3)UL (ref 150–450)
POTASSIUM SERPL-SCNC: 5.6 MMOL/L (ref 3.5–5.1)
Q-T INTERVAL: 444 MS
QRS DURATION: 136 MS
QTC CALCULATION (BEZET): 454 MS
R AXIS: -37 DEGREES
RBC # BLD AUTO: 3.9 X10(6)UL
SODIUM SERPL-SCNC: 145 MMOL/L (ref 136–145)
T AXIS: 25 DEGREES
VENTRICULAR RATE: 63 BPM
WBC # BLD AUTO: 6.1 X10(3) UL (ref 4–11)

## 2024-02-13 PROCEDURE — 36415 COLL VENOUS BLD VENIPUNCTURE: CPT

## 2024-02-13 PROCEDURE — 85025 COMPLETE CBC W/AUTO DIFF WBC: CPT

## 2024-02-13 PROCEDURE — 93005 ELECTROCARDIOGRAM TRACING: CPT

## 2024-02-13 PROCEDURE — 93010 ELECTROCARDIOGRAM REPORT: CPT | Performed by: INTERNAL MEDICINE

## 2024-02-13 PROCEDURE — 80048 BASIC METABOLIC PNL TOTAL CA: CPT

## 2024-02-14 ENCOUNTER — HOSPITAL ENCOUNTER (OUTPATIENT)
Dept: INTERVENTIONAL RADIOLOGY/VASCULAR | Facility: HOSPITAL | Age: 59
Discharge: HOME | End: 2024-02-14
Attending: INTERNAL MEDICINE | Admitting: INTERNAL MEDICINE
Payer: COMMERCIAL

## 2024-02-14 VITALS
HEART RATE: 65 BPM | BODY MASS INDEX: 44.16 KG/M2 | HEIGHT: 62 IN | SYSTOLIC BLOOD PRESSURE: 139 MMHG | RESPIRATION RATE: 23 BRPM | TEMPERATURE: 98 F | DIASTOLIC BLOOD PRESSURE: 79 MMHG | OXYGEN SATURATION: 96 % | WEIGHT: 240 LBS

## 2024-02-14 DIAGNOSIS — Z94.1 HISTORY OF HEART TRANSPLANT (HCC): ICD-10-CM

## 2024-02-14 LAB
ISTAT ACTIVATED CLOTTING TIME: 196 SECONDS (ref 74–137)
ISTAT ACTIVATED CLOTTING TIME: 277 SECONDS (ref 74–137)

## 2024-02-14 PROCEDURE — 99153 MOD SED SAME PHYS/QHP EA: CPT | Performed by: INTERNAL MEDICINE

## 2024-02-14 PROCEDURE — B241ZZ3 ULTRASONOGRAPHY OF MULTIPLE CORONARY ARTERIES, INTRAVASCULAR: ICD-10-PCS | Performed by: INTERNAL MEDICINE

## 2024-02-14 PROCEDURE — 92979 ENDOLUMINL IVUS OCT C EA: CPT | Performed by: INTERNAL MEDICINE

## 2024-02-14 PROCEDURE — B211YZZ FLUOROSCOPY OF MULTIPLE CORONARY ARTERIES USING OTHER CONTRAST: ICD-10-PCS | Performed by: INTERNAL MEDICINE

## 2024-02-14 PROCEDURE — 3E033PZ INTRODUCTION OF PLATELET INHIBITOR INTO PERIPHERAL VEIN, PERCUTANEOUS APPROACH: ICD-10-PCS | Performed by: INTERNAL MEDICINE

## 2024-02-14 PROCEDURE — 4A023N6 MEASUREMENT OF CARDIAC SAMPLING AND PRESSURE, RIGHT HEART, PERCUTANEOUS APPROACH: ICD-10-PCS | Performed by: INTERNAL MEDICINE

## 2024-02-14 PROCEDURE — 93456 R HRT CORONARY ARTERY ANGIO: CPT | Performed by: INTERNAL MEDICINE

## 2024-02-14 PROCEDURE — 99152 MOD SED SAME PHYS/QHP 5/>YRS: CPT | Performed by: INTERNAL MEDICINE

## 2024-02-14 PROCEDURE — 85347 COAGULATION TIME ACTIVATED: CPT

## 2024-02-14 PROCEDURE — 92978 ENDOLUMINL IVUS OCT C 1ST: CPT | Performed by: INTERNAL MEDICINE

## 2024-02-14 RX ORDER — IODIXANOL 320 MG/ML
100 INJECTION, SOLUTION INTRAVASCULAR
Status: COMPLETED | OUTPATIENT
Start: 2024-02-14 | End: 2024-02-14

## 2024-02-14 RX ORDER — SODIUM CHLORIDE 9 MG/ML
INJECTION, SOLUTION INTRAVENOUS
Status: DISCONTINUED | OUTPATIENT
Start: 2024-02-15 | End: 2024-02-14 | Stop reason: HOSPADM

## 2024-02-14 RX ORDER — MIDAZOLAM HYDROCHLORIDE 1 MG/ML
INJECTION INTRAMUSCULAR; INTRAVENOUS
Status: COMPLETED
Start: 2024-02-14 | End: 2024-02-14

## 2024-02-14 RX ORDER — BIVALIRUDIN 250 MG/5ML
INJECTION, POWDER, LYOPHILIZED, FOR SOLUTION INTRAVENOUS
Status: COMPLETED
Start: 2024-02-14 | End: 2024-02-14

## 2024-02-14 RX ORDER — LIDOCAINE HYDROCHLORIDE 10 MG/ML
INJECTION, SOLUTION EPIDURAL; INFILTRATION; INTRACAUDAL; PERINEURAL
Status: COMPLETED
Start: 2024-02-14 | End: 2024-02-14

## 2024-02-14 RX ADMIN — IODIXANOL 70 ML: 320 INJECTION, SOLUTION INTRAVASCULAR at 10:24:00

## 2024-02-14 NOTE — DISCHARGE INSTRUCTIONS
HOME CARE INSTRUCTIONS FOLLOWING CORONARY ANGIOGRAPHY, ANGIOPLASTY (PTCA/PTA) OR INSERTION OF STENT IN THE CORONARY      Activity  DO NOT drive after the procedure.  You may resume driving late the following day according to the nurse or physician's instructions  Plan on resting and relaxing tonight and tomorrow  Resume your normal activity after 48 hours, or as instructed by your physician  Do not lift anything over 10 pounds for 48 hours  Avoid heavy lifting for 1 week  Avoid drinking alcohol for the next 24 hours  If the groin site was used, avoid repeated stair use and excessive walking for the next 24 hours    What is Normal?  A small lump at the procedure site associated with mild tenderness when touched  The procedure site may be bruised or discolored  There may be a small amount of drainage on the bandage    Special Instructions  Drink plenty of fluids during the next 24 hours to \"flush\" the contrast from your system  The bandage is to remain in place for 24 hours  Keep the bandage clean and dry  DO NOT submerge the procedure site for 3 days (no bath tubs or pools)  After 24 hours, you must remove the bandage  You should shower after removing the bandage, and wash the procedure site gently with soap and water  If you choose to wear a bandage for a few days, make sure it remains clean and dry and that it is changed daily    When you should NOTIFY YOUR PHYSICIAN  Bleeding can occur at the procedure site - both on the outside of the skin and/or beneath the surface of the skin  Swelling or a large lump at the procedure site can occur, which may be accompanied by moderate to severe pain    If either of the above occurs, lie down flat.  Have someone apply pressure to the procedure site with both hands, as instructed by the nurse.  Hold pressure for 20 minutes and the bleeding should stop.  Notify your physician of the occurrence  If the bleeding does not stop, call 911 and continue to apply pressure    If you  experience signs of a fever, temperature > 101°, chills, infection (redness, swelling, thick yellow drainage, or a foul odor from the procedure site)  If you notice any numbness, tingling, or loss of feeling to your leg or foot or groin access      Other  You may resume your present diet, unless otherwise specified by your physician.  You may resume all of your medications as prescribed, unless otherwise directed by your physician.  A list of your medications was provided to you at discharge.  Continue the walking program initiated in the hospital and progress your walking as directed.  Or, gradually resume your previous aerobic exercise schedule as tolerated.  Hold metformin/glucophage 48 hours post proceudre.

## 2024-02-14 NOTE — CONSULTS
[facility]   History and Physical    Landon Zenobia Alexis Patient Status:  Outpatient    1965 MRN [id number]   Formerly [facility] INTERVENTIONAL SUITES Attending Luly Cody MD   Hosp Day # 0 PCP MD Reuben Luque, Landon COON.  Female - 1965  MRN: [id number]  CSN: [id number]              HPI: The patient is scheduled for RHC, LHC and intracoronary ultrasound of LMCA and LAD artery to assess for allograft vasculopathy in transplanted heart as a part of annual check in heart transplant patient. Pt has alergy to heparin gtt and will use IV angiomax for IVUS in cath lab.        PROBLEMS  Reconcile with Patient's Chart  PROBLEMS  Problem Effective Dates Date resolved Problem Status   COVID-19, [SNOMED-CT: 392241480] 2022 -  Active   Leukocytopenia, unspecified, [SNOMED-CT: 68922277] 5/3/2022 -  Active   Follow-up examination after heart transplant, [SNOMED-CT: 946507874] 2/3/2022 -  Active   Screening for osteoporosis, [SNOMED-CT: 800378839] 2/3/2022 -  Active   Immunodeficiency due to long term immunosuppressive drug therapy, [SNOMED-CT: 39622687476282262] 2/3/2022 -  Active   Encounter for monitoring cardiotoxic drug therapy, [SNOMED-CT: 093676480] 2022 -  Active   H/O heart transplant, [SNOMED-CT: 103130130] 10/22/2021 -  Active   Fatigue, [SNOMED-CT: 47184748] 10/22/2021 -  Active   Vitamin D deficiency, [SNOMED-CT: 15626261] 10/22/2021 -  Active   Palpitations, [SNOMED-CT: 56284807] 2021 -  Active   Obesity, Class III, BMI 40-49.9 (morbid obesity), [SNOMED-CT: 727361956] 2021 -  Active   Complications of transplanted heart, [SNOMED-CT: 661071220] 2021 -  Active   Hypothyroid, [SNOMED-CT: 99974189] 2019 -  Active   Hyperlipoproteinemia, [SNOMED-CT: 3219054] 2019 -  Active   Folic acid deficiency, [SNOMED-CT: 954473946] 2019 -  Active   Fatty liver, [SNOMED-CT: 886798985] 2019 -  Active   Atrial fibrillation, [SNOMED-CT:  93416796] 9/25/2019 -  Active   Anemia, [SNOMED-CT: 341175853] 9/25/2019 -  Active   Fatty liver, [SNOMED-CT: 972655371] 4/28/2014 -  Active   Proteinuria, [SNOMED-CT: 71122503] 4/28/2014 -  Active   Mixed hyperlipidemia, [SNOMED-CT: 972916794] 4/28/2014 -  Active   Heart replaced by transplant, [SNOMED-CT: 108344767] 11/23/2018 -  Active   Elevated brain natriuretic peptide (BNP) level, [SNOMED-CT: 048829626] 9/9/2020 -  Active   CKD (chronic kidney disease) stage 3, GFR 30-59 ml/min, [SNOMED-CT: 655482386] 9/9/2020 -  Active   Chronic anemia, [SNOMED-CT: 396850591] 12/20/2021 -  Active      ENCOUNTER DIAGNOSIS     No data available     VITAL SIGNS     VITAL SIGNS        BP Systolic 130 mmHg   BP Diastolic 72 mmHg   Height 62 inches   Weight 211 lbs   Pulse Rate 74 bpm   BSA (Body Surface Area) 2.1 cc/m2   BMI (Body Mass Index) 38.6 cc/m2   Blood Pressure 126 / 78 mmHg      PHYSICAL EXAMINATION     PHYSICAL EXAMINATION  Header Details   Vitals Right Arm Sitting /72 mmHg, Pulse rate 74 bpm, Height in 5' 2\", BMI: 38.6, Weight in 211 lbs (or) 95.71 kgs, BSA : 2.1 cc/m²   General Appearance No Acute Distress, Well groomed, Appropriate, Obese   Head/Eyes/Ears/Nose/Mouth/Throat Conjunctiva pink, Sclera Clear, EOMI, PERRLA, Mucous membranes Moist, No pallor, No icterus, Good Dentition   Neck Normal carotid pulsations, No carotid bruits, Normal thyroid palpation without goiter, No JVD   Respiratory Unlabored, Lungs clear with normal breath sounds, Equal bilaterally   Cardiovascular Intact distal pulses, Regular rhythm. Normal rate present. Normal and normal S1 and S2   Gastrointestinal Abdomen soft, Non-tender, Normoactive bowel sounds, No organomegaly, No masses, No pulsations   Musculoskeletal Normal spine   Gait Normal gait   Strength and tone Normal muscle strength, Normal muscle tone   Upper Extremities No clubbing, No cyanosis, No edema   Lower Extremities Pulses 2+ and equal bilaterally, No edema   Skin Warm  and dry, Intact, Good turgor, No rashes or lesions   Neurologic / Psychiatric Alert and Oriented, Non-focal   Speech Normal speech      ALLERGIES, ADVERSE REACTIONS, ALERTS  Reconcile with Patient's Chart  ALLERGIES, ADVERSE REACTIONS, ALERTS  Type Substance Reaction Severity Status   Allergies Heparin, [RxNorm: 535591]   Mild Active      MEDICATIONS ADMINISTERED DURING VISIT     No data available     MEDICATIONS  Reconcile with Patient's Chart  MEDICATIONS  Medication Start Date Route/Frequency Status   aspirin 81 MG EC tablet, [RxNorm: 208679] 7/8/2021 Take 81 mg by mouth daily.  Active   Atorvastatin Calcium 20 MG Oral Tab, [RxNorm: 755241] 6/28/2022 Take 20 mg by mouth nightly. Active   buPROPion 150 MG Oral Tablet 24 Hr, [RxNorm: 415330] 6/28/2022 Take 1 tablet (150 mg total) by mouth daily. Active   Calcium Citrate-Vitamin D 315-200 MG-UNIT Oral Tab, [RxNorm: 1274210] 6/28/2022 Take 1 tablet by mouth 2 (two) times daily. Active   CellCept 250 mg capsule, [RxNorm: 433351] 3/17/2022 Take 1 capsule orally every 12 hours. Active   clindamycin (CLEOCIN) 300 MG capsule, [RxNorm: 734795] 7/8/2021 Take two capsules 30 to 60 minutes prior to procedure. Active   Desoximetasone 0.25 % External Cream, [RxNorm: 111718] 6/28/2022 Apply 1 g topically 2 (two) times daily as needed. Active   esomeprazole (NEXIUM) 20 MG capsule, [RxNorm: 905214] 7/8/2021 Take by mouth daily (before breakfast).  Active   folic acid 1 MG Oral Tab, [RxNorm: 043385] 6/28/2022 Take 1 tablet (1 mg total) by mouth daily. Active   hydrALAZINE 50 mg tablet, [RxNorm: 676493] 7/8/2022 Take 1 tablet orally 2 times a day. Active   iron 325 mg (65 mg iron) tablet, [RxNorm: 253384] 6/28/2022 Take 1 tablet orally once a day. Active   levothyroxine 88 MCG Oral Tab, [RxNorm: 068731] 6/28/2022 Take 1 tablet (88 mcg total) by mouth daily. Active   losartan 50 mg tablet, [RxNorm: 537808] 7/26/2022 Take 1 tablet orally once a day. Active   magnesium oxide 400 MG  Oral Tab, [RxNorm: 054425] 6/28/2022 Take 800 mg by mouth 2 (two) times daily.  Active   metoprolol succinate ER 50 mg tablet,extended release 24 hr, [RxNorm: 054077] 6/28/2022 Take 1 tablet orally once in the evening. Active   metoprolol tartrate 25 mg tablet, [RxNorm: 175899] 6/28/2022 Take 1 tablet orally every 12 hours as needed for palpitations Active   multivitamin Oral Tab 6/28/2022 Take 1 tablet by mouth daily. Active   tacrolimus 1 mg capsule, immediate-release, [RxNorm: 907195] 9/28/2022 Take 2 capsules orally 2 times a day. Active                  FAMILY HISTORY     FAMILY HISTORY  Relationship Age Diagnosis   Father 42 Past heart attack (Reason for death)    Mother 0 Hypertension (HTN), primary; History of breast cancer - Hx       GENERAL STATUS     No data available     PAST MEDICAL HISTORY     PAST MEDICAL HISTORY  Problem Date diagonsed Date resolved Status   COVID-19, [SNOMED-CT: 355846193] 9/28/2022 -  Active   Leukocytopenia, unspecified, [SNOMED-CT: 28996398] 5/3/2022 -  Active   Follow-up examination after heart transplant, [SNOMED-CT: 318690096] 2/3/2022 -  Active   Screening for osteoporosis, [SNOMED-CT: 572300674] 2/3/2022 -  Active   Immunodeficiency due to long term immunosuppressive drug therapy, [SNOMED-CT: 01443548035051036] 2/3/2022 -  Active   Encounter for monitoring cardiotoxic drug therapy, [SNOMED-CT: 271527380] 1/14/2022 -  Active   H/O heart transplant, [SNOMED-CT: 627226337] 10/22/2021 -  Active   Fatigue, [SNOMED-CT: 05808442] 10/22/2021 -  Active   Vitamin D deficiency, [SNOMED-CT: 63388304] 10/22/2021 -  Active   Palpitations, [SNOMED-CT: 27594575] 7/8/2021 -  Active   Obesity, Class III, BMI 40-49.9 (morbid obesity), [SNOMED-CT: 899681423] 7/8/2021 -  Active   Hypothyroid, [SNOMED-CT: 68489202] 9/20/2019 -  Active   Hyperlipoproteinemia, [SNOMED-CT: 4906987] 9/25/2019 -  Active   Folic acid deficiency, [SNOMED-CT: 840324346] 9/25/2019 -  Active   Fatty liver, [SNOMED-CT:  046504741] 9/25/2019 -  Active   Atrial fibrillation, [SNOMED-CT: 68010215] 9/25/2019 -  Active   Anemia, [SNOMED-CT: 532375542] 9/25/2019 -  Active   Fatty liver, [SNOMED-CT: 953818952] 4/28/2014 -  Active   Proteinuria, [SNOMED-CT: 57102276] 4/28/2014 -  Active   Mixed hyperlipidemia, [SNOMED-CT: 132803371] 4/28/2014 -  Active   Heart replaced by transplant, [SNOMED-CT: 257734734] 11/23/2018 -  Active   Elevated brain natriuretic peptide (BNP) level, [SNOMED-CT: 873604613] 9/9/2020 -  Active   CKD (chronic kidney disease) stage 3, GFR 30-59 ml/min, [SNOMED-CT: 452581248] 9/9/2020 -  Active   Chronic anemia, [SNOMED-CT: 731935333] 12/20/2021 -  Active         IMMUNIZATIONS  Reconcile with Patient's Chart  No data available     PLAN OF CARE     PLAN OF CARE  Planned Care Date   Chest X-Ray, PA and Lateral 1/1/1900   Complete Pulmonary Function Testing (PFT) w/ Bronchodilator 1/1/1900   Dobutamine stress echocardiography 1/1/1900   XR DEXA Bone Densitometry 1/1/1900   Mammogram 1/1/1900   Heart Catheterization, Right and Left w/ IVUS 1/1/1900   CBC w/ Differential 1/1/1900   Comprehensive metabolic panel 1/1/1900   Hemoglobin A1C 1/1/1900   Lipid Panel_Fasting 1/1/1900   Mycophenolate Levels - Trough 1/1/1900   NT - ProBNP 1/1/1900   Protein/Creatinine Clearence_24hr Urine 1/1/1900   Tacrolimus Levels - Trough 1/1/1900   TSH (Thyroid Stimulating Hormone) Panel 1/1/1900   Vitamin D - Total 1/1/1900   Whole blood mononuclear cell heart transplant acute cellular rejection risk score determination by AlloMap 1/1/1900   Referral Visit - Yudi Herrera [email or web address] :  1/1/1900   Follow up visit - Luly Cody        Impression:  1.     Status post orthotopic heart transplantation December 2016.  2.     Chronic kidney disease stage 3.  3.     Diabetes mellitus type 2.  4.     Hypercholesterolemia.  5.     Folic acid and iron-deficiency anemia.  6.     Obesity.     Plan:  The patient  is scheduled for RHC, LHC and intracoronary ultrasound of LMCA and LAD artery to assess for allograft vasculopathy in transplanted heart as a part of annual check in heart transplant patient. Pt has alergy to heparin gtt and will use IV angiomax for IVUS in cath lab.     Patient was consented. The risks and benefits of the procedure were explained to the patient. 1-2% risk of coronary angiography, possible angioplasty, include, but are not limited to stroke, death, heart attack, coronary dissection requiring emergent CABG, hematoma, bleeding, allergic reaction to medications, vascular damage requiring surgical repair, kidney failure requiring dialysis and others. Patient wishes to proceed.     D/w pt, her daughter and the Nurses     Louie Parks M.D., F.A.C.C.  Interventional Cardiology  Advanced Heart Failure  [facility]    2/14/2024

## 2024-02-14 NOTE — PROCEDURES
[facility]    PATIENT'S NAME: AUDREY BADILLO   ATTENDING PHYSICIAN: Luly Cody M.D.   OPERATING PHYSICIAN: Lv Parks M.D.   PATIENT ACCOUNT#:   [id number]    LOCATION:  [address]  MEDICAL RECORD #:   [id number]       YOB: 1965  ADMISSION DATE:       02/14/2024      OPERATION DATE:  02/14/2024    CARDIAC PROCEDURE TRANSCRIPTION    CARDIAC CATHETERIZATION    PREOPERATIVE DIAGNOSIS:    1.   Status post orthotopic heart transplantation in December 2016.  2.   Chronic kidney disease stage 3.  3.   Diabetes mellitus type 2.  4.   Hypercholesterolemia.  5.   Obesity.  6.   Folic acid and iron deficiency anemia.  POSTOPERATIVE DIAGNOSIS:    1.   Status post orthotopic heart transplantation in December 2016.  2.   Chronic kidney disease stage 3.  3.   Diabetes mellitus type 2.  4.   Hypercholesterolemia.  5.   Obesity.  6.   Folic acid and iron deficiency anemia.  PROCEDURE PERFORMED:    1.   Selective coronary angiography.  2.   Right heart catheterization with saturations.  3.   Intracoronary ultrasound of left anterior descending artery.  4.   Intracoronary ultrasound of left main coronary artery.    SEDATION:  We performed moderate conscious sedation with IV Versed and IV fentanyl.  The time of first sedation dose was 9:32 a.m., and procedure ended at 10:15 a.m.  Blood pressure, pulse ox, and heart rate were monitored all the time by the nurse and myself, and IV line was checked by the nurse and myself.    PROCEDURE DESCRIPTION:  After written informed consent was obtained from the patient, she was brought to cardiac catheterization laboratory.  She was prepped and draped in usual sterile fashion.  Lidocaine 1% was used to infiltrate her right groin for local anesthesia.  A 6-Iraqi introducer sheath was inserted into right femoral artery, but we used a micropuncture kit and then exchanged for 6-Iraqi sheath.  A 7-Iraqi introducer sheath was inserted in the  right femoral vein using modified Seldinger technique.    Right heart catheterization was performed using pulmonary capillary wedge pressure catheter.  We measured right heart pressures and calculated cardiac output using a Rubens equation on room air.  We obtained saturations from pulmonary artery, right atrium, and aorta to screen for any intracardiac shunt on room air.    Selective coronary angiography was performed using 6-Bermudian FR4 diagnostic catheter for right coronary artery and 6-Bermudian JL4 diagnostic catheter for left coronary artery.  Left ventriculogram was not performed in this patient with heart transplant and kidney insufficiency for kidney safety.  The patient has preserved LV systolic function by echo imaging study recently.    Very important that the patient has allergies to IV heparin, and for this reason, we used IV Angiomax bolus for intracoronary ultrasound exam.    We performed intracoronary ultrasound using [employer] Eye ultrasound catheter to assess for any allograft vasculopathy.  A 7-Bermudian JL4 guide catheter was already in the ostium of the left main coronary artery.  Coronary Runthrough wire was used for intracoronary ultrasound exam.  It was advanced to the distal LAD artery with no difficulties.  Intracoronary ultrasound was advanced over the coronary wire.  We performed manual pullback and measured minimal luminal areas and minimal luminal dimensions to assess for allograft vasculopathy.    Final angiography of left coronary arterial system was performed and reviewed.  There was no bleeding hematoma, thrombus, or distal embolization angiographically.    Right femoral arterial sheathogram was performed and was acceptable for percutaneous closure using 6-Bermudian Angio-Seal closure device.  There was no bleeding or hematoma in the right groin.  Right femoral venous sheath was secured to the skin since ACT was 277 seconds.  Right femoral venous sheath will be pulled out in holding area  later when ACT is less than 180 seconds.    RIGHT HEART CATHETERIZATION HEMODYNAMICS:  Right atrial pressure 8 mmHg.  Right ventricular pressure 41/4/10 mmHg.  Pulmonary artery pressure 40/14/25 mmHg, and wedge pressure was 14 mmHg.  Transpulmonary gradient was 11 mmHg.  Pulmonary vascular resistance was 1.2 Wood units.     Rubens cardiac output 8.9 L/minute with cardiac index of 4.3 L/minute per sq m.    Saturations:  94.7% in aorta, 72% in pulmonary artery, and 74% in right atrium on room air, with hemoglobin sampling of 9.8.    Systemic blood pressure 120/59/85 mmHg, with a heart rate at 70 beats per minute.  Patient was in sinus with no prognostically significant arrhythmia.    SELECTIVE CORONARY ANGIOGRAPHY FINDINGS:    1.   Left main coronary artery had no disease angiographically.  2.   LAD artery and major diagonal branches had no disease angiographically.  3.   Left circumflex artery and major OM branches had no disease angiographically.  4.   Right coronary artery was a dominant system with no disease angiographically.  5.   Right posterior descending artery and posterolateral branches had no disease angiographically.    INTRACORONARY ULTRASOUND EXAM:  Mid LAD artery had no intimal hyperplasia by ultrasound.  Vessel area was 8.3 sq mm, and vessel dimension was 2.9 x 2.6 mm.    Proximal LAD artery had minimal intimal hyperplasia, circumferential, with vessel area of 16.0 sq mm and vessel dimension of 4.1 x 4.2 mm.  There was small superficial calcification only at the ostium of LAD artery, which was nonsignificant, and extending to very distal segment of left main with no obstruction or any significant stenosis.  The calcium location was opposite to circumflex ostium.    Left main coronary artery had mild eccentric calcified plaque right at the end of the main close to the ostium of LAD, which was nonsignificant.  The left main coronary artery otherwise looked normal by intracoronary ultrasound with no  intimal hyperplasia.  Left main vessel area was 24.9 sq mm, and vessel dimension was 5.4 x 5.5 mm.  Minimal luminal area at the level of calcified plaque was 19.8 sq mm.      IMPRESSION:    1.   Normal coronary arteries angiographically with dominant right coronary arterial system.  2.   Intracoronary ultrasound revealed minimal circumferential intimal hyperplasia in proximal left anterior descending and very small superficial calcification at the ostium of left anterior descending artery, but nothing significant and no obstruction of flow.  3.   Otherwise, no intimal hyperplasia in remaining segments of left anterior descending artery and left main coronary artery.  4.   Left ventriculogram was not performed due to kidney insufficiency in a heart transplant patient for kidney safety.  5.   Right heart catheterization hemodynamics after 500 mL of bolus was given for chronic kidney disease:  Normal biventricular filling pressures with normal cardiac output and mildly elevated pulmonary pressures with normal TPG and PVR.  6.   Very important, the patient has allergies to heparin, and we used IV Angiomax bolus for intracoronary ultrasound exam.  7.   Hemostasis of the right femoral artery was achieved with 6-Maltese Angio-Seal closure device.    PLAN:    1.   Continue medical management with antirejection medications in heart transplant patient.  2.   Continue IV hydration for kidney insufficiency after procedure in cardiac transplant patient.  3.   Check ACT in 40 minutes and pull the right femoral arterial sheath out if ACT is less than 180 seconds.  4.   Antirejection medications as scheduled.    5.   Follow up with referring, Dr. Luly Cody, within the next few weeks.    Thank you for allowing me to participate in this patient's care.  Should you have any questions, feel free to contact me.     Dictated By Lv Parks M.D.  d: 02/14/2024 10:42:37  t: 02/14/2024  14:14:34  [facility] [id number]  /    cc: JOSLYN [address]

## 2024-02-14 NOTE — IVS NOTE
Patient discharged home post cardiac cath. Right groin venous sheath pulled per protocol. Pt is able to sit up and ambulate without difficulty. Pt's vital signs are stable. Right groin procedural access site is dry and intact with no signs and symptoms of bleeding or hematoma. Pt tolerated food/fluids. Dr. Parks spoke to pt/family post procedure. Instructions provided and pt/family verbalized understanding. PIV removed. Discharged via wheelchair with all belongings. Sister driving home.

## 2024-02-23 ENCOUNTER — OFFICE VISIT (OUTPATIENT)
Dept: INTERNAL MEDICINE CLINIC | Facility: CLINIC | Age: 59
End: 2024-02-23
Payer: COMMERCIAL

## 2024-02-23 VITALS
BODY MASS INDEX: 44.72 KG/M2 | WEIGHT: 243 LBS | RESPIRATION RATE: 16 BRPM | SYSTOLIC BLOOD PRESSURE: 124 MMHG | HEART RATE: 70 BPM | DIASTOLIC BLOOD PRESSURE: 82 MMHG | HEIGHT: 62 IN

## 2024-02-23 DIAGNOSIS — Z94.1 HEART TRANSPLANT RECIPIENT (HCC): ICD-10-CM

## 2024-02-23 DIAGNOSIS — E66.01 CLASS 3 SEVERE OBESITY WITH SERIOUS COMORBIDITY AND BODY MASS INDEX (BMI) OF 40.0 TO 44.9 IN ADULT, UNSPECIFIED OBESITY TYPE (HCC): ICD-10-CM

## 2024-02-23 DIAGNOSIS — K76.0 FATTY LIVER: ICD-10-CM

## 2024-02-23 DIAGNOSIS — Z51.81 ENCOUNTER FOR THERAPEUTIC DRUG LEVEL MONITORING: Primary | ICD-10-CM

## 2024-02-23 DIAGNOSIS — F33.1 MAJOR DEPRESSIVE DISORDER, RECURRENT, MODERATE (HCC): ICD-10-CM

## 2024-02-23 DIAGNOSIS — E03.9 HYPOTHYROIDISM, UNSPECIFIED TYPE: ICD-10-CM

## 2024-02-23 DIAGNOSIS — I48.91 ATRIAL FIBRILLATION, UNSPECIFIED TYPE (HCC): ICD-10-CM

## 2024-02-23 DIAGNOSIS — Z91.89 AT INCREASED RISK FOR CARDIOVASCULAR DISEASE: ICD-10-CM

## 2024-02-23 DIAGNOSIS — Z94.1 H/O HEART TRANSPLANT (HCC): ICD-10-CM

## 2024-02-23 DIAGNOSIS — N18.32 STAGE 3B CHRONIC KIDNEY DISEASE (HCC): ICD-10-CM

## 2024-02-23 DIAGNOSIS — E78.2 MIXED HYPERLIPIDEMIA: ICD-10-CM

## 2024-02-23 PROCEDURE — 99204 OFFICE O/P NEW MOD 45 MIN: CPT | Performed by: PHYSICIAN ASSISTANT

## 2024-02-23 RX ORDER — SEMAGLUTIDE 0.68 MG/ML
INJECTION, SOLUTION SUBCUTANEOUS
Qty: 9 ML | Refills: 0 | Status: SHIPPED | OUTPATIENT
Start: 2024-02-23

## 2024-02-23 RX ORDER — NYSTATIN 100000 [USP'U]/G
1 POWDER TOPICAL 3 TIMES DAILY
COMMUNITY
Start: 2023-10-26

## 2024-02-23 NOTE — PATIENT INSTRUCTIONS
1400 -1500 calories a day  Moderate Carb (30/35/35)   110g   protein     57g   fats     128g   carbs     We are here to support you with weight loss, but please remember that you still need your primary care provider for your routine health maintenance.      PLAN:  True lemon - water flavoring packets  Semaglutide/B12  Begin Ozempic 0.25mg into skin weekly x 4 weeks, then increase to 0.5mg into skin weekly  Follow up with me in 3 months  Schedule follow up appointments: Blair La (dietitian) or Funmilayo Aguirre (presurgery dietitian)   Check for insurance coverage for dietitian and labwork prior to scheduling appointment.     Please try to work on the following dietary changes:  Goals: Aim for 20-30 grams of protein/ meal  Eat 4-6 vegetables/day  Avoid skipping meals- eat every 4-5 hours  Aim for 3 meals/day  2. Drink lots of water and cut down on soda/juice consumption if soda/juice drinker  3. Focus on protein: (15-30 grams with each meal) ie. greek yogurt, cottage cheese, string cheese, hard boiled eggs  4. Healthy snacks: peanut butter and apples, hummus and carrots, berries, nuts (1/4 cup), tuna and crackers                 Protein Shakes: Premier protein or Core Power                Protein Bars: Rx Bars, Oatmega, Power Crunch                 [place] balanced breaks (cheese and nuts)- without chocolate  5. Reduce carbohydrates which includes sweets as well as rice, pasta, potatoes, bread, corn and instead choose whole grain options or more protein or vegetables (4-6 servings of vegetables per day)  6. Get a good night of sleep  7. Try to decrease stress in life     Please download apps:  1. My Fitness Pal, Lose it, My Net Diary fannie to help you to monitor daily dietary intake and you will be able to see if you are eating the right amount of calories, protein, carbs                With My Fitness Pal-->When you set-up the fannie or need to adjust settings:                Goals should include:                 Lose  1.5-2 lbs per week                Activity level: not very active (can't count exercise towards calorie number per day)                   ** Daily INPUT> Look at nutrition section-- \"nutrients\" and it will break down your macros for the day (ie. Protein, carbs, fibers, sugars and fats). Try to stay within these numbers daily     2. \"7 minute workout\" to help with exercise/activity which takes 7 minutes of your day and that you can do at home!   3. \"Calm\" or \"Headspace\" which helps with mindfulness, meditation, clarity, sleep, and juanpablo to your daily life.   4. [employer] blog for healthy recipe ideas  5. [employer] for low carb resources    HIGH PROTEIN SNACK IDEAS  -cottage cheese  -plain yogurt  -kefir  -hard-boiled eggs  -natural cheeses  -nuts (measure portion size)   -unsweetened nut butters  -dried edamame   -sriram seeds soaked in water or almond milk  -soy nuts  -cured meats (monitor for sodium issues)   -hummus with vegetables  -bean dip with vegetables     FRUIT  Low carb fruit options   Raspberries: Half a cup (60 grams) contains 3 grams of carbs.  Blackberries: Half a cup (70 grams) contains 4 grams of carbs.  Strawberries: Half a cup (100 grams) contains 6 grams of carbs.  Blueberries: Half a cup (50 grams) contains 6 grams of carbs.  Plum: One medium-sized (80 grams) contains 6 grams of carbs.     VEGETABLES  Low carb vegetables            Delicious and Healthy Snacks      All snacks are under 200 calories and chocked full of nutrition!  Quick Caprese salad- Mix 1 cup grape tomatoes, 1 oz. fresh mini mozzarella balls, 1 teaspoon olive oil, ½ tsp. balsamic vinegar.  Add fresh or dried basil for flavor.  Cottage cheese and berries- Top ½ cup low fat cottage cheese with 1/2 cup of blueberries  Peanut butter and apple slices- Cut up an apple and dip in 1 Tablespoon of peanut butter  Hummus and veggies- Dip baby carrots, pepper strips or snap peas in ¼ cup of hummus  Nuts- Munch on 1 ounce of any kind of  nut (about ¼ cup)  Wrap it up- Wrap 2 ounces of low sodium, nitrate free turkey around red pepper or cucumber slices  Yogurt and berries- 1/2 cup berries on a 6 ounce container of plain or low sugar fruit flavored 2% Greek yogurt (less than 15 grams sugar per serving).  Chobani (Less Sugar)® or Siggis® are examples.  Hardboiled egg and fruit- 1 hardboiled egg and a tangerine or other small serving of fruit  Tuna and avocado- Put 2 oz tuna (one pouch) on 1/3 of an avocado or 2 Tbsp guacamole and top with salsa  String cheese and veggies- one piece cheese (3/4 ounce) and 1 cup snap peas or other vegetables  Cauliflower rice and cheese- Sprinkle 1/4 cup shredded cheese on 1 cup cauliflower rice and microwave until cheese melts  Deviled eggs- 3 deviled egg halves  Bean dip and veggies-½ cup refried beans with ¼ cup shredded cheese melted on top. Use as a dip for celery or red peppers strips or just eat plain  [place] Balanced Breaks® (or make your own-1/2 ounce nuts, 1/2 ounce cheese and 1 teaspoon dried fruit)  Edamame-1 cup warmed and lightly salted  Low fat chicken, egg, or tuna salad- Mix 2 oz chicken, tuna, or 2 eggs with 1 tsp huerta,1 tsp Nicanor mustard and 1 tsp plain yogurt.  Add chopped celery, onions, walnuts, Granny smith apples or dried cranberries to make it interesting.  [place] break- Turkey, chicken, peanut butter or almond butter on 1 slice whole grain bread   Protein shake-Rogers®, Core Power®, Orgain®, Premier Protein®  Protein bar-Quest ®, Oatmega®, RX Bar®

## 2024-02-23 NOTE — PROGRESS NOTES
HISTORY OF PRESENT ILLNESS  Chief Complaint   Patient presents with    Weight Problem     Recommendation from Cardiologist, never try meds. Open for medications but that go well with with her heart transplant.       Landon Alexis is a 58 year old female new to our office today for initiation of medical weight loss program.  Patient presents today with c/o excess weight.       Cardiologist suggested   Has struggled with weight her whole life  Will get on a diet and do well and then fall off and gain it all back    Reason/goal for weight loss: healthier, heart transplant    Previous weight loss efforts in the past/medication:  has tried everything, ANUSHKA Meraz, low carb, fad diets    Interest in Bariatric surgery: 0/10    Barriers to weight loss:     Wt Readings from Last 6 Encounters:   02/23/24 243 lb (110.2 kg)   02/09/24 240 lb (108.9 kg)   01/11/23 222 lb (100.7 kg)   01/25/22 215 lb (97.5 kg)   12/20/21 219 lb (99.3 kg)   06/05/21 236 lb 15.9 oz (107.5 kg)          Breakfast Lunch Dinner Snacks Fluids   A lot of times skips  Typically not hungry Sometimes skips  Lately has been eating lunch  Just started ordering the [employer] meals Grazing Chips  Crackers  candy Not good with fluids     Social hx and lifestyle reviewed:    Work: office work  Marital status:   Support: lives with mom and sister  Tobacco use: none  ETOH use: none  Supplements: magnesium, calcium  Exercise: once she gets into a routine does better  Stress level: moderate  Sleep hours and integrity: 7hrs a night, but is interrupted    MEDICAL HISTORY  PMH reviewed:   Cardiac disorders: Yes, A. Fib, heart transplant, 7yrs ago  Depression/anxiety: Yes  Glaucoma: negative   Kidney stones: Yes  Eating disorder: negative   Migraines: negative   Seizures: negative   Joint-related conditions:negative   Liver disease: Yes, fatty liver  Renal disease: Yes, CKD stage 3  Diabetes: negative  Thyroid disease: Yes  Constipation: negative  Miscarriage:  negative   DVT: negative  Family or personal history of Pancreatic issues / Medullary Thyroid Cancer: negative    History of bariatric surgery: negative     FMH reviewed: obesity in parent/s or sibling:     REVIEW OF SYSTEMS  GENERAL: feels well otherwise,   NECK: denies thickening   LUNGS: denies shortness of breath with exertion, no apnea   CARDIOVASCULAR: denies chest pain on exertion , denies palpitations or pedal edema  GI: denies abdominal pain.  No N/V/D/C  MUSCULOSKELETAL: denies joint pains   NEURO: denies headaches   PSYCH: denies change in behavior or mood, denies feeling sad or depressed     EXAM  /82   Pulse 70   Resp 16   Ht 5' 2\" (1.575 m)   Wt 243 lb (110.2 kg)   LMP 08/05/2009   BMI 44.45 kg/m² , Percent body fat: F >32%       GENERAL: well developed, well nourished, in no apparent distress  SKIN: warm, pink, dry without rashes to exposed area   EYES: conjunctiva pink, sclera non icteric, PERRL  HEENT: atraumatic, normocephalic, O/p: Mallampati score- 2  NECK: supple, non tender, no adenopathy, no thyromegaly  LUNGS: CTA in all fields, breathing non labored  CARDIO: RRR without murmur, normal S1 and S2 without clicks or gallops, no pedal edema. EKG not performed in office  GI: rotund, no masses, HSM or tenderness  MUSCULOSKELETAL: grossly intact   NEURO: Oriented times three, full ROM of bilateral UE/LE  PSYCH: pleasant, cooperative, normal mood and affect    Lab Results   Component Value Date    WBC 6.1 02/13/2024    RBC 3.90 02/13/2024    HGB 11.1 (L) 02/13/2024    HCT 35.1 02/13/2024    MCV 90.0 02/13/2024    MCH 28.5 02/13/2024    MCHC 31.6 02/13/2024    RDW 13.0 02/13/2024    .0 02/13/2024    MPV 9.5 09/02/2017     Lab Results   Component Value Date    GLU 97 02/13/2024    BUN 33 (H) 02/13/2024    BUNCREA 23.5 (H) 01/19/2023    CREATSERUM 1.56 (H) 02/13/2024    ANIONGAP 3 02/13/2024    GFR 45 (L) 01/20/2018    GFRNAA 33 (L) 01/24/2022    GFRAA 38 (L) 01/24/2022    CA 9.3  02/13/2024    OSMOCALC 307 (H) 02/13/2024    ALKPHO 148 (H) 10/03/2023    AST 30 10/03/2023    ALT 28 10/03/2023    BILT 0.5 10/03/2023    TP 7.2 10/03/2023    ALB 2.8 (L) 10/03/2023    GLOBULIN 4.4 10/03/2023    AGRATIO 0.9 (L) 02/04/2014     02/13/2024    K 5.6 (H) 02/13/2024     (H) 02/13/2024    CO2 26.0 02/13/2024     Lab Results   Component Value Date     10/03/2023    A1C 5.6 10/03/2023     Lab Results   Component Value Date    CHOLEST 111 10/03/2023    TRIG 81 10/03/2023    HDL 47 10/03/2023    LDL 48 10/03/2023    VLDL 11 10/03/2023    TCHDLRATIO 2.16 06/12/2018    NONHDLC 64 10/03/2023     Lab Results   Component Value Date    T4F 1.3 06/05/2023    TSH 1.770 10/03/2023     Lab Results   Component Value Date    B12 534 12/20/2021     Lab Results   Component Value Date    VITD 40.6 10/03/2023       Current Outpatient Medications on File Prior to Visit   Medication Sig Dispense Refill    Nystatin 831333 UNIT/GM External Powder Apply 1 Application topically 3 (three) times daily.      Esomeprazole Magnesium 20 MG Oral Capsule Delayed Release Take 1 capsule (20 mg total) by mouth every morning before breakfast.      ferrous sulfate 325 (65 FE) MG Oral Tab EC Take 1 tablet (325 mg total) by mouth daily with breakfast.      metoprolol succinate ER 50 MG Oral Tablet 24 Hr Take 1 tablet (50 mg total) by mouth daily.      metoprolol tartrate 25 MG Oral Tab Take 1 tablet (25 mg total) by mouth 2 (two) times daily as needed (as needed for palpitations).      levothyroxine 88 MCG Oral Tab Take 1 tablet (88 mcg total) by mouth daily. 90 tablet 3    buPROPion 150 MG Oral Tablet 24 Hr Take 1 tablet (150 mg total) by mouth daily. 90 tablet 3    hydrALAzine HCl 50 MG Oral Tab Take 1 tablet (50 mg total) by mouth 2 (two) times a day. 60 tablet 3    folic acid 1 MG Oral Tab Take 1 tablet (1 mg total) by mouth daily. 90 tablet 3    Mycophenolate Mofetil 500 MG Oral Tab Take 0.5 tablets (250 mg total) by  mouth 2 (two) times daily before meals.      tacrolimus 1 MG Oral Cap Take 2 capsules (2 mg total) by mouth 2 (two) times daily.      Losartan Potassium 50 MG Oral Tab Take 1 tablet (50 mg total) by mouth daily.  0    Desoximetasone 0.25 % External Cream Apply 1 g topically 2 (two) times daily as needed.      aspirin 81 MG Oral Tab Take 1 tablet (81 mg total) by mouth daily.      Atorvastatin Calcium 20 MG Oral Tab Take 1 tablet (20 mg total) by mouth nightly.      Calcium Citrate-Vitamin D 315-200 MG-UNIT Oral Tab Take 1 tablet by mouth 2 (two) times daily.      magnesium oxide 400 MG Oral Tab Take 2 tablets (800 mg total) by mouth 2 (two) times daily.      multivitamin Oral Tab Take 1 tablet by mouth daily.       No current facility-administered medications on file prior to visit.       ASSESSMENT  Initial Weight Data and Goal Weight Loss:       Diagnoses and all orders for this visit:    Encounter for therapeutic drug level monitoring  -     [facility] Dietician Referral  ([facility] USE ONLY)  -     semaglutide (OZEMPIC, 0.25 OR 0.5 MG/DOSE,) 2 MG/3ML Subcutaneous Solution Pen-injector; Begin with 0.25mg into skin weekly x 4 weeks, and then increase to 0.5mg into skin weekly    Class 3 severe obesity with serious comorbidity and body mass index (BMI) of 40.0 to 44.9 in adult, unspecified obesity type (HCC)  -     [facility] Dietician Referral  ([facility] USE ONLY)  -     semaglutide (OZEMPIC, 0.25 OR 0.5 MG/DOSE,) 2 MG/3ML Subcutaneous Solution Pen-injector; Begin with 0.25mg into skin weekly x 4 weeks, and then increase to 0.5mg into skin weekly    Atrial fibrillation, unspecified type (HCC)  -     [facility] Dietician Referral  ([facility] USE ONLY)  -     semaglutide (OZEMPIC, 0.25 OR 0.5 MG/DOSE,) 2 MG/3ML Subcutaneous Solution Pen-injector; Begin with 0.25mg into skin weekly x 4 weeks, and then increase to 0.5mg into skin weekly    Fatty liver  -     [facility] Dietician Referral  ([facility] USE ONLY)  -     semaglutide (OZEMPIC, 0.25 OR 0.5 MG/DOSE,) 2 MG/3ML  Subcutaneous Solution Pen-injector; Begin with 0.25mg into skin weekly x 4 weeks, and then increase to 0.5mg into skin weekly    Heart transplant recipient (HCC)  -     [facility] Dietician Referral  ([facility] USE ONLY)  -     semaglutide (OZEMPIC, 0.25 OR 0.5 MG/DOSE,) 2 MG/3ML Subcutaneous Solution Pen-injector; Begin with 0.25mg into skin weekly x 4 weeks, and then increase to 0.5mg into skin weekly    Stage 3b chronic kidney disease (HCC)  -     [facility] Dietician Referral  ([facility] USE ONLY)  -     semaglutide (OZEMPIC, 0.25 OR 0.5 MG/DOSE,) 2 MG/3ML Subcutaneous Solution Pen-injector; Begin with 0.25mg into skin weekly x 4 weeks, and then increase to 0.5mg into skin weekly    At increased risk for cardiovascular disease  -     semaglutide (OZEMPIC, 0.25 OR 0.5 MG/DOSE,) 2 MG/3ML Subcutaneous Solution Pen-injector; Begin with 0.25mg into skin weekly x 4 weeks, and then increase to 0.5mg into skin weekly    Mixed hyperlipidemia    Hypothyroidism, unspecified type    H/O heart transplant (HCC)    Major depressive disorder, recurrent, moderate (HCC)        PLAN  Initial Weight: 243lbs  Initial Weight Date: 2/23/24  Today's Weight: 243lbs  5% Goal: 12.15lbs  10% Goal: 24.3lbs  Total Weight Loss:     Will begin Ozempic 0.25mg weekly - denies any personal or family history of pancreatitis, pancreatic cancer, thyroid cancer, MEN2 - discussed MOA, advised side effects and adverse effects of medication.  H/O heart transplant - continue current medications and f/u with cardiologist  Mood is stable on current medications  Hypothyroidism - stable, continue current dosage  Begin logging foods - discussed macronutrient goals  -low carb high protein  -portion control  -Limit carbohydrates  -Eat breakfast every day   -Don't skip meals   -Read nutrition labels and keep a food log  -drink a lot of water 65 oz of water per day  - Do not drink your calories (no soda, juice, high calorie coffee drinks, limit alcohol)  - Do not eat late at  night  Medication use and side effects reviewed with patient.  Medication contraindications: stimulant, metformin  Follow up with dietitian and psychologist as recommended.  Discussed the role of sleep and stress in weight management.  Labs orders as above.  Counseled on comprehensive weight loss plan including attention to nutrition, exercise and behavior/stress management for success. See patient instruction below for more details.  Weight Loss Consent to treat reviewed and signed.    Total time spent on chart review, pre-charting, obtaining history, counseling, and educating, reviewing labs was 45 minutes.      Patient Instructions   1400 -1500 calories a day  Moderate Carb (30/35/35)   110g   protein     57g   fats     128g   carbs     We are here to support you with weight loss, but please remember that you still need your primary care provider for your routine health maintenance.      PLAN:  True lemon - water flavoring packets  Semaglutide/B12  Begin Ozempic 0.25mg into skin weekly x 4 weeks, then increase to 0.5mg into skin weekly  Follow up with me in 3 months  Schedule follow up appointments: Blair La (dietitian) or Funmilayo Aguirre (presurgery dietitian)   Check for insurance coverage for dietitian and labwork prior to scheduling appointment.     Please try to work on the following dietary changes:  Goals: Aim for 20-30 grams of protein/ meal  Eat 4-6 vegetables/day  Avoid skipping meals- eat every 4-5 hours  Aim for 3 meals/day  2. Drink lots of water and cut down on soda/juice consumption if soda/juice drinker  3. Focus on protein: (15-30 grams with each meal) ie. greek yogurt, cottage cheese, string cheese, hard boiled eggs  4. Healthy snacks: peanut butter and apples, hummus and carrots, berries, nuts (1/4 cup), tuna and crackers                 Protein Shakes: Premier protein or Core Power                Protein Bars: Rx Bars, Oatmega, Power Crunch                 [place] balanced breaks (cheese and  nuts)- without chocolate  5. Reduce carbohydrates which includes sweets as well as rice, pasta, potatoes, bread, corn and instead choose whole grain options or more protein or vegetables (4-6 servings of vegetables per day)  6. Get a good night of sleep  7. Try to decrease stress in life     Please download apps:  1. My Fitness Pal, Lose it, My Net Diary fannie to help you to monitor daily dietary intake and you will be able to see if you are eating the right amount of calories, protein, carbs                With My Fitness Pal-->When you set-up the fannie or need to adjust settings:                Goals should include:                 Lose 1.5-2 lbs per week                Activity level: not very active (can't count exercise towards calorie number per day)                   ** Daily INPUT> Look at nutrition section-- \"nutrients\" and it will break down your macros for the day (ie. Protein, carbs, fibers, sugars and fats). Try to stay within these numbers daily     2. \"7 minute workout\" to help with exercise/activity which takes 7 minutes of your day and that you can do at home!   3. \"Calm\" or \"Headspace\" which helps with mindfulness, meditation, clarity, sleep, and juanpablo to your daily life.   4. [employer] blog for healthy recipe ideas  5. [employer] for low carb resources    HIGH PROTEIN SNACK IDEAS  -cottage cheese  -plain yogurt  -kefir  -hard-boiled eggs  -natural cheeses  -nuts (measure portion size)   -unsweetened nut butters  -dried edamame   -sriram seeds soaked in water or almond milk  -soy nuts  -cured meats (monitor for sodium issues)   -hummus with vegetables  -bean dip with vegetables     FRUIT  Low carb fruit options   Raspberries: Half a cup (60 grams) contains 3 grams of carbs.  Blackberries: Half a cup (70 grams) contains 4 grams of carbs.  Strawberries: Half a cup (100 grams) contains 6 grams of carbs.  Blueberries: Half a cup (50 grams) contains 6 grams of carbs.  Plum: One medium-sized (80 grams)  contains 6 grams of carbs.     VEGETABLES  Low carb vegetables            Delicious and Healthy Snacks      All snacks are under 200 calories and chocked full of nutrition!  Quick Caprese salad- Mix 1 cup grape tomatoes, 1 oz. fresh mini mozzarella balls, 1 teaspoon olive oil, ½ tsp. balsamic vinegar.  Add fresh or dried basil for flavor.  Cottage cheese and berries- Top ½ cup low fat cottage cheese with 1/2 cup of blueberries  Peanut butter and apple slices- Cut up an apple and dip in 1 Tablespoon of peanut butter  Hummus and veggies- Dip baby carrots, pepper strips or snap peas in ¼ cup of hummus  Nuts- Munch on 1 ounce of any kind of nut (about ¼ cup)  Wrap it up- Wrap 2 ounces of low sodium, nitrate free turkey around red pepper or cucumber slices  Yogurt and berries- 1/2 cup berries on a 6 ounce container of plain or low sugar fruit flavored 2% Greek yogurt (less than 15 grams sugar per serving).  Chobani (Less Sugar)® or Siggis® are examples.  Hardboiled egg and fruit- 1 hardboiled egg and a tangerine or other small serving of fruit  Tuna and avocado- Put 2 oz tuna (one pouch) on 1/3 of an avocado or 2 Tbsp guacamole and top with salsa  String cheese and veggies- one piece cheese (3/4 ounce) and 1 cup snap peas or other vegetables  Cauliflower rice and cheese- Sprinkle 1/4 cup shredded cheese on 1 cup cauliflower rice and microwave until cheese melts  Deviled eggs- 3 deviled egg halves  Bean dip and veggies-½ cup refried beans with ¼ cup shredded cheese melted on top. Use as a dip for celery or red peppers strips or just eat plain  [place] Balanced Breaks® (or make your own-1/2 ounce nuts, 1/2 ounce cheese and 1 teaspoon dried fruit)  Edamame-1 cup warmed and lightly salted  Low fat chicken, egg, or tuna salad- Mix 2 oz chicken, tuna, or 2 eggs with 1 tsp huerta,1 tsp Nicanor mustard and 1 tsp plain yogurt.  Add chopped celery, onions, walnuts, Granny smith apples or dried cranberries to make it  interesting.  [place] break- Turkey, chicken, peanut butter or almond butter on 1 slice whole grain bread   Protein shake-Rogers®, Core Power®, Orgain®, Premier Protein®  Protein bar-Quest ®, Oatmega®, RX Bar®      No follow-ups on file.    Patient verbalizes understanding.    Flory Fuentes PA-C  2/23/2024

## 2024-03-04 ENCOUNTER — PATIENT MESSAGE (OUTPATIENT)
Dept: INTERNAL MEDICINE CLINIC | Facility: CLINIC | Age: 59
End: 2024-03-04

## 2024-03-05 NOTE — TELEPHONE ENCOUNTER
From: Landon Alexis  To: Flory Fuentes  Sent: 3/4/2024 1:12 PM CST  Subject: Prescription Ozempic     Hello,  I had my first visit on Friday Feb, 23rd. I was told they would be putting in a request with my health insurance to see if Ozempic would be covered. I have not heard back yet from anyone and would like to know if it was submitted? I was talking with my cardiologist (Dr Cody)and she suggested trying to see if zephound would be coverd if we had a problem with the ozempic or others that were suggested.   Thank you,  Landon

## 2024-03-11 RX ORDER — ORAL SEMAGLUTIDE 3 MG/1
1 TABLET ORAL DAILY
Qty: 30 TABLET | Refills: 0 | Status: SHIPPED | OUTPATIENT
Start: 2024-03-11 | End: 2024-04-10

## 2024-03-11 NOTE — TELEPHONE ENCOUNTER
We could try and see if Rybelsus would be covered, oral medication, take first thing in the morning on empty stomach with 4oz of water, nothing to eat,drink, meds for 30 min    Another option would be compounded semaglutide, I would recommend she make sure Dr. Cody is ok with that

## 2024-03-12 ENCOUNTER — OFFICE VISIT (OUTPATIENT)
Dept: INTERNAL MEDICINE CLINIC | Facility: CLINIC | Age: 59
End: 2024-03-12
Payer: COMMERCIAL

## 2024-03-12 DIAGNOSIS — I48.91 ATRIAL FIBRILLATION, UNSPECIFIED TYPE (HCC): ICD-10-CM

## 2024-03-12 DIAGNOSIS — N18.32 STAGE 3B CHRONIC KIDNEY DISEASE (HCC): ICD-10-CM

## 2024-03-12 DIAGNOSIS — K76.0 FATTY LIVER: ICD-10-CM

## 2024-03-12 DIAGNOSIS — E66.01 CLASS 3 SEVERE OBESITY WITH SERIOUS COMORBIDITY AND BODY MASS INDEX (BMI) OF 40.0 TO 44.9 IN ADULT, UNSPECIFIED OBESITY TYPE (HCC): ICD-10-CM

## 2024-03-12 DIAGNOSIS — Z51.81 ENCOUNTER FOR THERAPEUTIC DRUG LEVEL MONITORING: Primary | ICD-10-CM

## 2024-03-12 DIAGNOSIS — Z94.1 HEART TRANSPLANT RECIPIENT (HCC): ICD-10-CM

## 2024-03-12 PROCEDURE — 97802 MEDICAL NUTRITION INDIV IN: CPT

## 2024-03-12 NOTE — PROGRESS NOTES
INITIAL OUTPATIENT NUTRITION CONSULTATION      Nutrition Assessment    Medical Diagnosis: Obesity and CKD stage 3, hx of heart transplant, hypothyroidism    Physical Findings: fatigue    Client Age and Gender: 58 year old female      Labs:   No components found for: \"HGBA1C\"  TRIGLYCERIDES   Date Value Ref Range Status   03/29/2014 42 <150 mg/dL Final     Triglycerides   Date Value Ref Range Status   10/03/2023 81 30 - 149 mg/dL Final     Comment:     Reference interval for fasting triglycerides  Desirable: <150 mg/dL  Borderline: 150-199 mg/dL  High: 200-499 mg/dL  Very High: >=500 mg/dL         03/14/2018 77 <150 mg/dL Final     LDL Cholesterol Calc   Date Value Ref Range Status   12/27/2013 111 (H) 0 - 99 mg/dL Final     LDL Cholesterol   Date Value Ref Range Status   10/03/2023 48 <100 mg/dL Final     Comment:     Desirable <100 mg/dL   Borderline 100-129 mg/dL   High     >=130mg/dL         LDL CHOLESTROL   Date Value Ref Range Status   03/29/2014 84 <130 mg/dL Final     Calculated LDL   Date Value Ref Range Status   03/14/2018 71 <130 mg/dL Final     HDL Cholesterol   Date Value Ref Range Status   10/03/2023 47 40 - 59 mg/dL Final     Comment:     Interpretive Information:   An HDL cholesterol <40 mg/dL is low and constitutes a coronary heart disease risk factor. An HDL cholesterol >60 mg/dL is a negative risk factor for coronary heart disease.       12/27/2013 86 >39 mg/dL Final     Comment:     According to ATP-III Guidelines, HDL-C >59 mg/dL is considered a  negative risk factor for CHD.     HDL CHOL   Date Value Ref Range Status   03/29/2014 49 mg/dL Final     Comment:     <26    mg/dL  Highest CHD risk  25-35  mg/dL  High CHD risk  35-45  mg/dL  Moderate CHD risk  45-60  mg/dL  Average CHD risk  60-75  mg/dL  Below Average CHD risk     Direct HDL   Date Value Ref Range Status   03/14/2018 64 >=45 mg/dL Final     Comment:     Guidelines for high density lipoprotein (HDL) are adapted from the National  Cholesterol Education Program (NCEP).Values >60 mg/dL are considered a negative risk factor for coronary heart disease (CHD) and are considered protective.     AST (SGOT)   Date Value Ref Range Status   02/04/2014 20 0 - 40 IU/L Final     AST   Date Value Ref Range Status   10/03/2023 30 15 - 37 U/L Final   08/30/2018 34 15 - 41 U/L Final   08/01/2014 25 15 - 41 U/L Final     ALT (SGPT)   Date Value Ref Range Status   02/04/2014 6 0 - 32 IU/L Final     ALT   Date Value Ref Range Status   10/03/2023 28 13 - 56 U/L Final   08/30/2018 28 14 - 54 U/L Final   08/01/2014 11 (L) 14 - 54 U/L Final         Height:  Ht Readings from Last 1 Encounters:   02/23/24 5' 2\" (1.575 m)       Weight:   Wt Readings from Last 2 Encounters:   02/23/24 243 lb   02/09/24 240 lb       BMI Readings from Last 1 Encounters:   02/23/24 44.45 kg/m²           Diet/Weight History: ANUSHKA Meraz, low carb     Current Diet: likes broccoli and cauliflower - no other vegetables     Food/Beverage Intake:   Breakfast: donut   Sweets in the morning - cookie   Coffee with creamer   Starbucks or [employer] (some kind of latte - pistachio, caramel)   Lunch: sometimes eats lunch and sometimes   Fast food - taco bell, burger hyacinth - burger, bronson, drink (dr. Pepper)   Dinner: couple pieces of cheese, bread, piece of apple strudle, glass of milk   Snacks: chips,   Beverages: coffee, soda, tea(sweet tea watered down)    Meal pattern: 3 meals/d, 2 snacks/d    Number of meals/week eaten at restaurants: 3x/week fast food         Alcohol Intake: 0 ozs/wk    Estimated current caloric intake: just WW, not currently    Estimated caloric needs for weight loss: 1249 to 1749 kcal/day for 1-2 pounds/week weight loss    Physical Activity: used to enjoy walking, not much exercise      Food Journal: n/a      Spent 45 minutes in consultation with the patient.      Nutrition Intervention/Education:  Comprehensive nutrition education and evaluation provided for weight loss. Reviewed  diet/weight/medical hx.  She would like some ideas for healthy eating that allow her to eat the foods she enjoys. Provided some ideas to start adding to her current diet, including protein/plant protein due to CKD/adding vegetarian protein shake (orgain) to coffee in the morning, etc.   Patient agreed to goals below.    Goals:   Adding protein drink in the morning     Monitoring/Evaluation:  Patient encouraged to schedule follow up appt          Sravani Cutler RD, RICHYN

## 2024-05-09 ENCOUNTER — TELEPHONE (OUTPATIENT)
Dept: INTERNAL MEDICINE CLINIC | Facility: CLINIC | Age: 59
End: 2024-05-09

## 2024-05-09 NOTE — TELEPHONE ENCOUNTER
Mayi Schwarz left message to call back with patient contact #  Ref # [id number]     Contact Information  [phone]      I called [employer] and spoke to Luz

## 2024-05-21 ENCOUNTER — TELEMEDICINE (OUTPATIENT)
Facility: CLINIC | Age: 59
End: 2024-05-21

## 2024-05-21 DIAGNOSIS — Z91.89 AT INCREASED RISK FOR CARDIOVASCULAR DISEASE: ICD-10-CM

## 2024-05-21 DIAGNOSIS — Z94.1 H/O HEART TRANSPLANT (HCC): ICD-10-CM

## 2024-05-21 DIAGNOSIS — K76.0 FATTY LIVER: ICD-10-CM

## 2024-05-21 DIAGNOSIS — I48.91 ATRIAL FIBRILLATION, UNSPECIFIED TYPE (HCC): ICD-10-CM

## 2024-05-21 DIAGNOSIS — E78.2 MIXED HYPERLIPIDEMIA: ICD-10-CM

## 2024-05-21 DIAGNOSIS — E66.01 CLASS 3 SEVERE OBESITY WITH SERIOUS COMORBIDITY AND BODY MASS INDEX (BMI) OF 40.0 TO 44.9 IN ADULT, UNSPECIFIED OBESITY TYPE (HCC): ICD-10-CM

## 2024-05-21 DIAGNOSIS — Z51.81 ENCOUNTER FOR THERAPEUTIC DRUG LEVEL MONITORING: Primary | ICD-10-CM

## 2024-05-21 DIAGNOSIS — N18.32 STAGE 3B CHRONIC KIDNEY DISEASE (HCC): ICD-10-CM

## 2024-05-21 NOTE — PROGRESS NOTES
This visit is conducted using Telemedicine with live, interactive video and audio.    Patient has been referred to the [facility] website at [email or web address] to review the yearly Consent to Treat document.    Patient understands and accepts financial responsibility for any deductible, co-insurance and/or co-pays associated with this service.      HISTORY OF PRESENT ILLNESS  Chief Complaint   Patient presents with    Weight Check       Landon Alexis is a 59 year old female here for follow up in medical weight loss program.   Hasn't weighed herself  Pt is compliant on compound semaglutide, she hasn't started it yet  Denies chest pain, shortness of breath, dizziness, blurred vision, headache, paresthesia, nausea/vomiting.   Met with dietician   The last 2 weeks has been more stressful, work was really busy  Didn't prepare the proper foods    Exercise/Activity: admits minimal  Nutrition: 24 hour food log reviewed, eating regular meals, +protein  Stress: higher, home has improved, work stress is still high, but trying to manage better  Sleep: 7-8 hours/night         Wt Readings from Last 6 Encounters:   02/23/24 243 lb (110.2 kg)   02/09/24 240 lb (108.9 kg)   01/11/23 222 lb (100.7 kg)   01/25/22 215 lb (97.5 kg)   12/20/21 219 lb (99.3 kg)   06/05/21 236 lb 15.9 oz (107.5 kg)            Breakfast Lunch Dinner Snacks Fluids              REVIEW OF SYSTEMS  GENERAL HEALTH: feels well otherwise, denied any fevers chills or night sweats   RESPIRATORY: denies shortness of breath   CARDIOVASCULAR: denies chest pain  GI: denies abdominal pain    EXAM  LMP 08/05/2009   GENERAL: well developed, well nourished,in no apparent distress, A/O x3  SKIN: no rashes,no suspicious lesions on visible skin  HEENT: atraumatic, normocephalic  LUNGS: no increased effort or work with breathing   NEURO: speaking fluently and in clear sentences    Lab Results   Component Value Date    WBC 6.1 02/13/2024    RBC 3.90 02/13/2024    HGB 11.1  (L) 02/13/2024    HCT 35.1 02/13/2024    MCV 90.0 02/13/2024    MCH 28.5 02/13/2024    MCHC 31.6 02/13/2024    RDW 13.0 02/13/2024    .0 02/13/2024    MPV 9.5 09/02/2017     Lab Results   Component Value Date    GLU 97 02/13/2024    BUN 33 (H) 02/13/2024    BUNCREA 23.5 (H) 01/19/2023    CREATSERUM 1.56 (H) 02/13/2024    ANIONGAP 3 02/13/2024    GFR 45 (L) 01/20/2018    GFRNAA 33 (L) 01/24/2022    GFRAA 38 (L) 01/24/2022    CA 9.3 02/13/2024    OSMOCALC 307 (H) 02/13/2024    ALKPHO 148 (H) 10/03/2023    AST 30 10/03/2023    ALT 28 10/03/2023    BILT 0.5 10/03/2023    TP 7.2 10/03/2023    ALB 2.8 (L) 10/03/2023    GLOBULIN 4.4 10/03/2023    AGRATIO 0.9 (L) 02/04/2014     02/13/2024    K 5.6 (H) 02/13/2024     (H) 02/13/2024    CO2 26.0 02/13/2024     Lab Results   Component Value Date     10/03/2023    A1C 5.6 10/03/2023     Lab Results   Component Value Date    CHOLEST 111 10/03/2023    TRIG 81 10/03/2023    HDL 47 10/03/2023    LDL 48 10/03/2023    VLDL 11 10/03/2023    TCHDLRATIO 2.16 06/12/2018    NONHDLC 64 10/03/2023     Lab Results   Component Value Date    T4F 1.3 06/05/2023    TSH 1.770 10/03/2023     Lab Results   Component Value Date    B12 534 12/20/2021     Lab Results   Component Value Date    VITD 40.6 10/03/2023       Current Outpatient Medications on File Prior to Visit   Medication Sig Dispense Refill    semaglutide-weight management 0.5 MG/0.5ML Subcutaneous Solution Auto-injector Inject 0.5 mL (0.5 mg total) into the skin once a week for 4 doses. 2 mL 0    CUSTOM MEDICATION Semaglutide 0.25mg    Semaglutide 0.25mg/Cyanocobolamin    1/0.5 mg/mL  Inject 25 units/0.25mL into skin q weekly    Disp: 1mL vial 1 each 0    Semaglutide (RYBELSUS) 3 MG Oral Tab Take 1 tablet by mouth daily. Take first thing in the morning on empty stomach with 4oz of water, nothing to eat/drink/meds for 30 min 30 tablet 0    Nystatin [id number] UNIT/GM External Powder Apply 1 Application topically 3  (three) times daily.      semaglutide (OZEMPIC, 0.25 OR 0.5 MG/DOSE,) 2 MG/3ML Subcutaneous Solution Pen-injector Begin with 0.25mg into skin weekly x 4 weeks, and then increase to 0.5mg into skin weekly 9 mL 0    Esomeprazole Magnesium 20 MG Oral Capsule Delayed Release Take 1 capsule (20 mg total) by mouth every morning before breakfast.      ferrous sulfate 325 (65 FE) MG Oral Tab EC Take 1 tablet (325 mg total) by mouth daily with breakfast.      metoprolol succinate ER 50 MG Oral Tablet 24 Hr Take 1 tablet (50 mg total) by mouth daily.      metoprolol tartrate 25 MG Oral Tab Take 1 tablet (25 mg total) by mouth 2 (two) times daily as needed (as needed for palpitations).      levothyroxine 88 MCG Oral Tab Take 1 tablet (88 mcg total) by mouth daily. 90 tablet 3    buPROPion 150 MG Oral Tablet 24 Hr Take 1 tablet (150 mg total) by mouth daily. 90 tablet 3    hydrALAzine HCl 50 MG Oral Tab Take 1 tablet (50 mg total) by mouth 2 (two) times a day. 60 tablet 3    folic acid 1 MG Oral Tab Take 1 tablet (1 mg total) by mouth daily. 90 tablet 3    Mycophenolate Mofetil 500 MG Oral Tab Take 0.5 tablets (250 mg total) by mouth 2 (two) times daily before meals.      tacrolimus 1 MG Oral Cap Take 2 capsules (2 mg total) by mouth 2 (two) times daily.      Losartan Potassium 50 MG Oral Tab Take 1 tablet (50 mg total) by mouth daily.  0    Desoximetasone 0.25 % External Cream Apply 1 g topically 2 (two) times daily as needed.      aspirin 81 MG Oral Tab Take 1 tablet (81 mg total) by mouth daily.      Atorvastatin Calcium 20 MG Oral Tab Take 1 tablet (20 mg total) by mouth nightly.      Calcium Citrate-Vitamin D 315-200 MG-UNIT Oral Tab Take 1 tablet by mouth 2 (two) times daily.      magnesium oxide 400 MG Oral Tab Take 2 tablets (800 mg total) by mouth 2 (two) times daily.      multivitamin Oral Tab Take 1 tablet by mouth daily.       No current facility-administered medications on file prior to visit.        ASSESSMENT  Analyzed weight data:       Diagnoses and all orders for this visit:    Encounter for therapeutic drug level monitoring    Class 3 severe obesity with serious comorbidity and body mass index (BMI) of 40.0 to 44.9 in adult, unspecified obesity type (HCC)    Atrial fibrillation, unspecified type (HCC)    H/O heart transplant (HCC)    At increased risk for cardiovascular disease    Fatty liver    Mixed hyperlipidemia    Stage 3b chronic kidney disease (HCC)        PLAN  Initial Weight: 243lbs  Initial Weight Date: 2/23/24  Today's Weight: 243lbs  5% Goal: 12.15lbs  10% Goal: 24.3lbs  Total Weight Loss:    Will begin compound semaglutide - Discussed with pt at length that combination therapy is considered off label use and not FDA approved patient was advised of theoretical risk of combination therapy and risks that we may not be aware of as it's not been studied clinically  Fatty liver - low carb, low fat diet  Discussed stress management, having more readily available higher protein options   Incorporate more movement  HLD - stable on current medication atorvastatin, will continue, will continue to work on lifestyle modifications  Consistency with logging foods - protein and produce  Nutrition: low carb diet/ recommended to eat breakfast daily/ regular protein intake  Medication use and side effects reviewed with patient.  Medication contraindications: stimulant, metformin  Follow up with dietitian and psychologist as recommended.  Discussed the role of sleep and stress in weight management.  Counseled on comprehensive weight loss plan including attention to nutrition, exercise and behavior/stress management for success. See patient instruction below for more details.  Discussed strategies to overcome barriers to successful weight loss and weight maintenance  FITTE: ACSM recommendations (150-300 minutes/ week in active weight loss)   Weight Loss consent to treat reviewed and signed     There are no  Patient Instructions on file for this visit.    No follow-ups on file.    Patient verbalizes understanding.    Flory Fuentes PA-C  5/21/2024    Please note that the following visit was completed using two-way, real-time interactive audio and video communication.  This has been done in good rafaela to provide continuity of care in the best interest of the provider-patient relationship, due to the ongoing public health crisis/national emergency and because of restrictions of visitation.  There are limitations of this visit as no physical exam could be performed.  Every conscious effort was taken to allow for sufficient and adequate time.  This billing was spent on reviewing labs, medications, radiology tests and decision making.  Appropriate medical decision-making and tests are ordered as detailed in the plan of care above    Flory Fuentes PA-C

## 2024-08-26 ENCOUNTER — OFFICE VISIT (OUTPATIENT)
Facility: CLINIC | Age: 59
End: 2024-08-26
Payer: COMMERCIAL

## 2024-08-26 VITALS
WEIGHT: 249 LBS | HEART RATE: 74 BPM | BODY MASS INDEX: 45.82 KG/M2 | OXYGEN SATURATION: 98 % | RESPIRATION RATE: 18 BRPM | SYSTOLIC BLOOD PRESSURE: 116 MMHG | HEIGHT: 62 IN | DIASTOLIC BLOOD PRESSURE: 68 MMHG

## 2024-08-26 DIAGNOSIS — E78.2 MIXED HYPERLIPIDEMIA: ICD-10-CM

## 2024-08-26 DIAGNOSIS — Z94.1 H/O HEART TRANSPLANT (HCC): ICD-10-CM

## 2024-08-26 DIAGNOSIS — Z51.81 ENCOUNTER FOR THERAPEUTIC DRUG LEVEL MONITORING: Primary | ICD-10-CM

## 2024-08-26 DIAGNOSIS — I48.91 ATRIAL FIBRILLATION, UNSPECIFIED TYPE (HCC): ICD-10-CM

## 2024-08-26 DIAGNOSIS — Z91.89 AT INCREASED RISK FOR CARDIOVASCULAR DISEASE: ICD-10-CM

## 2024-08-26 DIAGNOSIS — K76.0 FATTY LIVER: ICD-10-CM

## 2024-08-26 DIAGNOSIS — E66.01 CLASS 3 SEVERE OBESITY WITH SERIOUS COMORBIDITY AND BODY MASS INDEX (BMI) OF 40.0 TO 44.9 IN ADULT, UNSPECIFIED OBESITY TYPE (HCC): ICD-10-CM

## 2024-08-26 NOTE — PROGRESS NOTES
HISTORY OF PRESENT ILLNESS  Chief Complaint   Patient presents with    Weight Check     +6lbs       Landon Alexis is a 59 year old female here for follow up in medical weight loss program.   +6lbs  Compliant on compound semaglutide  Denies chest pain, shortness of breath, dizziness, blurred vision, headache, paresthesia, nausea/vomiting.   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, teaches devon  Nutrition: 24 hour food log reviewed, eating regular meals, +protein  Stress: has been higher  Sleep: no problems      Wt Readings from Last 6 Encounters:   08/26/24 249 lb (112.9 kg)   02/23/24 243 lb (110.2 kg)   02/09/24 240 lb (108.9 kg)   01/11/23 222 lb (100.7 kg)   01/25/22 215 lb (97.5 kg)   12/20/21 219 lb (99.3 kg)            Breakfast Lunch Dinner Snacks Fluids   Reviewed           REVIEW OF SYSTEMS  GENERAL HEALTH: feels well otherwise, denied any fevers chills or night sweats   RESPIRATORY: denies shortness of breath   CARDIOVASCULAR: denies chest pain  GI: denies abdominal pain    EXAM  /68   Pulse 74   Resp 18   Ht 5' 2\" (1.575 m)   Wt 249 lb (112.9 kg)   LMP 08/05/2009   SpO2 98%   BMI 45.54 kg/m²   GENERAL: well developed, well nourished,in no apparent distress, A/O x3  SKIN: no rashes,no suspicious lesions  HEENT: atraumatic, normocephalic, OP-clear, PERRL  NECK: supple,no adenopathy  LUNGS: clear to auscultation bilaterally   CARDIO: RRR without murmur  GI: good BS's,NT/ND, no masses or HSM  EXTREMITIES: no cyanosis, no clubbing, no edema    Lab Results   Component Value Date    WBC 6.1 02/13/2024    RBC 3.90 02/13/2024    HGB 11.1 (L) 02/13/2024    HCT 35.1 02/13/2024    MCV  90.0 02/13/2024    MCH 28.5 02/13/2024    MCHC 31.6 02/13/2024    RDW 13.0 02/13/2024    .0 02/13/2024    MPV 9.5 09/02/2017     Lab Results   Component Value Date    GLU 97 02/13/2024    BUN 33 (H) 02/13/2024    BUNCREA 23.5 (H) 01/19/2023    CREATSERUM 1.56 (H) 02/13/2024    ANIONGAP 3 02/13/2024    GFR 45 (L) 01/20/2018    GFRNAA 33 (L) 01/24/2022    GFRAA 38 (L) 01/24/2022    CA 9.3 02/13/2024    OSMOCALC 307 (H) 02/13/2024    ALKPHO 148 (H) 10/03/2023    AST 30 10/03/2023    ALT 28 10/03/2023    BILT 0.5 10/03/2023    TP 7.2 10/03/2023    ALB 2.8 (L) 10/03/2023    GLOBULIN 4.4 10/03/2023    AGRATIO 0.9 (L) 02/04/2014     02/13/2024    K 5.6 (H) 02/13/2024     (H) 02/13/2024    CO2 26.0 02/13/2024     Lab Results   Component Value Date     10/03/2023    A1C 5.6 10/03/2023     Lab Results   Component Value Date    CHOLEST 111 10/03/2023    TRIG 81 10/03/2023    HDL 47 10/03/2023    LDL 48 10/03/2023    VLDL 11 10/03/2023    TCHDLRATIO 2.16 06/12/2018    NONHDLC 64 10/03/2023     Lab Results   Component Value Date    T4F 1.3 06/05/2023    TSH 1.770 10/03/2023     Lab Results   Component Value Date    B12 534 12/20/2021     Lab Results   Component Value Date    VITD 40.6 10/03/2023       Current Outpatient Medications on File Prior to Visit   Medication Sig Dispense Refill    CUSTOM MEDICATION Semaglutide 0.25mg    Semaglutide 0.25mg/Cyanocobolamin    1/0.5 mg/mL  Inject 25 units/0.25mL into skin q weekly    Disp: 1mL vial 1 each 0    Nystatin [id number] UNIT/GM External Powder Apply 1 Application topically 3 (three) times daily.      Esomeprazole Magnesium 20 MG Oral Capsule Delayed Release Take 1 capsule (20 mg total) by mouth every morning before breakfast.      ferrous sulfate 325 (65 FE) MG Oral Tab EC Take 1 tablet (325 mg total) by mouth daily with breakfast.      metoprolol succinate ER 50 MG Oral Tablet 24 Hr Take 1 tablet (50 mg total) by mouth daily.      levothyroxine 88 MCG Oral  Tab Take 1 tablet (88 mcg total) by mouth daily. 90 tablet 3    buPROPion 150 MG Oral Tablet 24 Hr Take 1 tablet (150 mg total) by mouth daily. 90 tablet 3    hydrALAzine HCl 50 MG Oral Tab Take 1 tablet (50 mg total) by mouth 2 (two) times a day. 60 tablet 3    folic acid 1 MG Oral Tab Take 1 tablet (1 mg total) by mouth daily. 90 tablet 3    Mycophenolate Mofetil 500 MG Oral Tab Take 0.5 tablets (250 mg total) by mouth 2 (two) times daily before meals.      tacrolimus 1 MG Oral Cap Take 2 capsules (2 mg total) by mouth 2 (two) times daily.      Losartan Potassium 50 MG Oral Tab Take 1 tablet (50 mg total) by mouth daily.  0    aspirin 81 MG Oral Tab Take 1 tablet (81 mg total) by mouth daily.      Atorvastatin Calcium 20 MG Oral Tab Take 1 tablet (20 mg total) by mouth nightly.      Calcium Citrate-Vitamin D 315-200 MG-UNIT Oral Tab Take 1 tablet by mouth 2 (two) times daily.      magnesium oxide 400 MG Oral Tab Take 2 tablets (800 mg total) by mouth 2 (two) times daily.      multivitamin Oral Tab Take 1 tablet by mouth daily.      semaglutide (OZEMPIC, 0.25 OR 0.5 MG/DOSE,) 2 MG/3ML Subcutaneous Solution Pen-injector Begin with 0.25mg into skin weekly x 4 weeks, and then increase to 0.5mg into skin weekly (Patient not taking: Reported on 8/26/2024) 9 mL 0    metoprolol tartrate 25 MG Oral Tab Take 1 tablet (25 mg total) by mouth 2 (two) times daily as needed (as needed for palpitations). (Patient not taking: Reported on 8/26/2024)      Desoximetasone 0.25 % External Cream Apply 1 g topically 2 (two) times daily as needed. (Patient not taking: Reported on 8/26/2024)       No current facility-administered medications on file prior to visit.       ASSESSMENT  Analyzed weight data:       Diagnoses and all orders for this visit:    Encounter for therapeutic drug level monitoring  -     semaglutide-weight management 0.5 MG/0.5ML Subcutaneous Solution Auto-injector; Inject 0.5 mL (0.5 mg total) into the skin once a week  for 4 doses.    Class 3 severe obesity with serious comorbidity and body mass index (BMI) of 40.0 to 44.9 in adult, unspecified obesity type (HCC)    Mixed hyperlipidemia  -     semaglutide-weight management 0.5 MG/0.5ML Subcutaneous Solution Auto-injector; Inject 0.5 mL (0.5 mg total) into the skin once a week for 4 doses.    Fatty liver  -     semaglutide-weight management 0.5 MG/0.5ML Subcutaneous Solution Auto-injector; Inject 0.5 mL (0.5 mg total) into the skin once a week for 4 doses.    Atrial fibrillation, unspecified type (HCC)  -     semaglutide-weight management 0.5 MG/0.5ML Subcutaneous Solution Auto-injector; Inject 0.5 mL (0.5 mg total) into the skin once a week for 4 doses.    H/O heart transplant (HCC)  -     semaglutide-weight management 0.5 MG/0.5ML Subcutaneous Solution Auto-injector; Inject 0.5 mL (0.5 mg total) into the skin once a week for 4 doses.    At increased risk for cardiovascular disease  -     semaglutide-weight management 0.5 MG/0.5ML Subcutaneous Solution Auto-injector; Inject 0.5 mL (0.5 mg total) into the skin once a week for 4 doses.        PLAN  Initial Weight: 243lbs  Initial Weight Date: 2/23/24  Today's Weight: 249lbs  5% Goal: 12.15lbs  10% Goal: 24.3lbs  Total Weight Loss: Net gain 6lbs    Will begin Wegovy 0.25mg weekly - denies any personal or family history of pancreatitis, pancreatic cancer, thyroid cancer, MEN2 - discussed MOA, advised side effects and adverse effects of medication.  If insurance doesn't cover wegovy will continue with compound semaglutide, discussed with pt at length that combination therapy is considered off label use and not FDA approved patient was advised of theoretical risk of combination therapy and risks that we may not be aware of as it's not been studied clinically  HLD - stable on current medication atorvastatin, will continue, will continue to work on lifestyle modifications  H/o heart transplant - continue to f/u with cardiologist  [place]  stable, continue current medications  Consistency with logging foods, protein and produce  Discussed ways to incorporate more movement, sit and be fit, chair yoga, resistance bands  Nutrition: low carb diet/ recommended to eat breakfast daily/ regular protein intake  Medication use and side effects reviewed with patient.  Medication contraindications: stimulant, metformin  Follow up with dietitian and psychologist as recommended.  Discussed the role of sleep and stress in weight management.  Counseled on comprehensive weight loss plan including attention to nutrition, exercise and behavior/stress management for success. See patient instruction below for more details.  Discussed strategies to overcome barriers to successful weight loss and weight maintenance  FITTE: ACSM recommendations (150-300 minutes/ week in active weight loss)   Weight Loss consent to treat reviewed and signed       NOTE TO PATIENT: The 21st Century Cures Act makes clinical notes like these available to patients in the interest of transparency. Clinical notes are medical documents used by physicians and care providers to communicate with each other. These documents include medical language and terminology, abbreviations, and treatment information that may sound technical and at times possibly unfamiliar. In addition, at times, the verbiage may appear blunt or direct. These documents are one tool providers use to communicate relevant information and clinical opinions of the care providers in a way that allows common understanding of the clinical context.     There are no Patient Instructions on file for this visit.    No follow-ups on file.    Patient verbalizes understanding.    Flory Fuentes PA-C  8/26/2024

## 2024-09-10 ENCOUNTER — TELEPHONE (OUTPATIENT)
Dept: INTERNAL MEDICINE CLINIC | Facility: CLINIC | Age: 59
End: 2024-09-10

## 2024-09-10 NOTE — TELEPHONE ENCOUNTER
PA is needed will try to initiate in epic  Submit clinical notes  Awaiting decision    At increased risk for cardiovascular disease Z91.89

## 2024-11-30 ENCOUNTER — LAB ENCOUNTER (OUTPATIENT)
Dept: LAB | Age: 59
End: 2024-11-30
Attending: INTERNAL MEDICINE
Payer: COMMERCIAL

## 2024-11-30 DIAGNOSIS — E55.9 VITAMIN D DEFICIENCY: ICD-10-CM

## 2024-11-30 DIAGNOSIS — Z48.21 FOLLOW-UP EXAMINATION AFTER HEART TRANSPLANT (HCC): Primary | ICD-10-CM

## 2024-11-30 DIAGNOSIS — E03.9 HYPOTHYROID: ICD-10-CM

## 2024-11-30 DIAGNOSIS — Z94.1 HEART TRANSPLANT STATUS (HCC): ICD-10-CM

## 2024-11-30 DIAGNOSIS — E78.2 MIXED HYPERLIPIDEMIA: ICD-10-CM

## 2024-11-30 LAB
ALBUMIN SERPL-MCNC: 3.9 G/DL (ref 3.2–4.8)
ALBUMIN/GLOB SERPL: 1.3 {RATIO} (ref 1–2)
ALP LIVER SERPL-CCNC: 111 U/L
ALT SERPL-CCNC: 9 U/L
ANION GAP SERPL CALC-SCNC: 7 MMOL/L (ref 0–18)
AST SERPL-CCNC: 17 U/L (ref ?–34)
BASOPHILS # BLD AUTO: 0.02 X10(3) UL (ref 0–0.2)
BASOPHILS NFR BLD AUTO: 0.2 %
BILIRUB SERPL-MCNC: 0.4 MG/DL (ref 0.3–1.2)
BUN BLD-MCNC: 41 MG/DL (ref 9–23)
BUN/CREAT SERPL: 24.1 (ref 10–20)
CALCIUM BLD-MCNC: 9.5 MG/DL (ref 8.7–10.4)
CHLORIDE SERPL-SCNC: 110 MMOL/L (ref 98–112)
CO2 SERPL-SCNC: 24 MMOL/L (ref 21–32)
CREAT BLD-MCNC: 1.7 MG/DL
DEPRECATED RDW RBC AUTO: 41.4 FL (ref 35.1–46.3)
EGFRCR SERPLBLD CKD-EPI 2021: 34 ML/MIN/1.73M2 (ref 60–?)
EOSINOPHIL # BLD AUTO: 0.23 X10(3) UL (ref 0–0.7)
EOSINOPHIL NFR BLD AUTO: 2.6 %
ERYTHROCYTE [DISTWIDTH] IN BLOOD BY AUTOMATED COUNT: 12.5 % (ref 11–15)
EST. AVERAGE GLUCOSE BLD GHB EST-MCNC: 111 MG/DL (ref 68–126)
FASTING STATUS PATIENT QL REPORTED: YES
GLOBULIN PLAS-MCNC: 2.9 G/DL (ref 2–3.5)
GLUCOSE BLD-MCNC: 104 MG/DL (ref 70–99)
HBA1C MFR BLD: 5.5 % (ref ?–5.7)
HCT VFR BLD AUTO: 35.2 %
HGB BLD-MCNC: 11.7 G/DL
IMM GRANULOCYTES # BLD AUTO: 0.04 X10(3) UL (ref 0–1)
IMM GRANULOCYTES NFR BLD: 0.5 %
LYMPHOCYTES # BLD AUTO: 1.65 X10(3) UL (ref 1–4)
LYMPHOCYTES NFR BLD AUTO: 18.7 %
MCH RBC QN AUTO: 30.4 PG (ref 26–34)
MCHC RBC AUTO-ENTMCNC: 33.2 G/DL (ref 31–37)
MCV RBC AUTO: 91.4 FL
MONOCYTES # BLD AUTO: 0.84 X10(3) UL (ref 0.1–1)
MONOCYTES NFR BLD AUTO: 9.5 %
NEUTROPHILS # BLD AUTO: 6.04 X10 (3) UL (ref 1.5–7.7)
NEUTROPHILS # BLD AUTO: 6.04 X10(3) UL (ref 1.5–7.7)
NEUTROPHILS NFR BLD AUTO: 68.5 %
NT-PROBNP SERPL-MCNC: 1499 PG/ML (ref ?–125)
OSMOLALITY SERPL CALC.SUM OF ELEC: 302 MOSM/KG (ref 275–295)
PLATELET # BLD AUTO: 240 10(3)UL (ref 150–450)
POTASSIUM SERPL-SCNC: 5 MMOL/L (ref 3.5–5.1)
PROT SERPL-MCNC: 6.8 G/DL (ref 5.7–8.2)
RBC # BLD AUTO: 3.85 X10(6)UL
SODIUM SERPL-SCNC: 141 MMOL/L (ref 136–145)
T4 FREE SERPL-MCNC: 1.4 NG/DL (ref 0.8–1.7)
TSI SER-ACNC: 0.45 UIU/ML (ref 0.55–4.78)
VIT D+METAB SERPL-MCNC: 55.7 NG/ML (ref 30–100)
WBC # BLD AUTO: 8.8 X10(3) UL (ref 4–11)

## 2024-11-30 PROCEDURE — 80053 COMPREHEN METABOLIC PANEL: CPT

## 2024-11-30 PROCEDURE — 85025 COMPLETE CBC W/AUTO DIFF WBC: CPT

## 2024-11-30 PROCEDURE — 83880 ASSAY OF NATRIURETIC PEPTIDE: CPT

## 2024-11-30 PROCEDURE — 84439 ASSAY OF FREE THYROXINE: CPT

## 2024-11-30 PROCEDURE — 82306 VITAMIN D 25 HYDROXY: CPT

## 2024-11-30 PROCEDURE — 84443 ASSAY THYROID STIM HORMONE: CPT

## 2024-11-30 PROCEDURE — 36415 COLL VENOUS BLD VENIPUNCTURE: CPT

## 2024-11-30 PROCEDURE — 80197 ASSAY OF TACROLIMUS: CPT

## 2024-11-30 PROCEDURE — 83036 HEMOGLOBIN GLYCOSYLATED A1C: CPT

## 2024-11-30 PROCEDURE — 80180 DRUG SCRN QUAN MYCOPHENOLATE: CPT

## 2024-12-03 LAB — MYCOPHENOLIC ACID: 1 MCG/ML

## 2024-12-04 LAB — TACROLIMUS LVL: 8.2 NG/ML

## 2025-01-21 RX ORDER — LEVOTHYROXINE SODIUM 100 UG/1
100 TABLET ORAL
COMMUNITY

## 2025-01-22 RX ORDER — AMOXICILLIN 500 MG/1
2000 CAPSULE ORAL AS NEEDED
COMMUNITY

## 2025-01-22 NOTE — PAT NURSING NOTE
Per PAT encounter/MyChart message sent to pt:    PreOp Instructions     You are scheduled for: a Cardiac Procedure     Date of Procedure: 01/29/25 Wednesday     Diet Instructions: Do not eat or drink anything after midnight including gum, mints, candy, etc.     Medications: Medications you are allowed to take can be taken with a sip of water the morning of your procedure, Take Aspirin 81 mg x 4 tablets the day of your procedure     Medications to Stop: Hold herbal supplements and vitamins     Skin Prep : Shower with antibacterial soap using a clean washcloth, prior to procedure. Once dried off, no lotions/powders/creams/ointments, etc.     Arrival Time: The day prior to your procedure (Tuesday) you will receive a phone call before 6:00 pm with your arrival time. If you haven't received a phone call, please check your voicemail messages., If you did not receive a voice mail and it is after 6:00 pm, please call the nursing supervisor at [phone].    Driving After Procedure: Sedation will be given so you WILL NOT be able to drive home. You will need a responsible adult  to drive you home. You can NOT take uber or taxi unless approved by your physician in advance.     Discharge Teaching: Your nurse will give you specific instructions before discharge, Most people can resume normal activities in 2-3 days, Any questions, please call the physician's office      parking is available starting at 6 am or park in the [place] parking garage at [facility]. Check in at the [facility] reception desk. Our  will be there to check you in for your procedure. Please bring your insurance cards and ID with you.                                                                                                                                      Please DO NOT respond to this message, the inbasket is not monitored for messages. For any questions, please call the physician's office.

## 2025-01-25 ENCOUNTER — LAB ENCOUNTER (OUTPATIENT)
Dept: LAB | Facility: HOSPITAL | Age: 60
End: 2025-01-25
Attending: INTERNAL MEDICINE
Payer: COMMERCIAL

## 2025-01-25 ENCOUNTER — HOSPITAL ENCOUNTER (OUTPATIENT)
Dept: GENERAL RADIOLOGY | Facility: HOSPITAL | Age: 60
Discharge: HOME | End: 2025-01-25
Attending: INTERNAL MEDICINE
Payer: COMMERCIAL

## 2025-01-25 DIAGNOSIS — Z94.1 H/O HEART TRANSPLANT (HCC): ICD-10-CM

## 2025-01-25 DIAGNOSIS — Z01.818 PRE-OP TESTING: ICD-10-CM

## 2025-01-25 LAB
ANION GAP SERPL CALC-SCNC: 5 MMOL/L (ref 0–18)
BASOPHILS # BLD AUTO: 0.03 X10(3) UL (ref 0–0.2)
BASOPHILS NFR BLD AUTO: 0.4 %
BUN BLD-MCNC: 35 MG/DL (ref 9–23)
CALCIUM BLD-MCNC: 8.8 MG/DL (ref 8.7–10.6)
CHLORIDE SERPL-SCNC: 107 MMOL/L (ref 98–112)
CO2 SERPL-SCNC: 25 MMOL/L (ref 21–32)
CREAT BLD-MCNC: 1.66 MG/DL
EGFRCR SERPLBLD CKD-EPI 2021: 35 ML/MIN/1.73M2 (ref 60–?)
EOSINOPHIL # BLD AUTO: 0.38 X10(3) UL (ref 0–0.7)
EOSINOPHIL NFR BLD AUTO: 4.7 %
ERYTHROCYTE [DISTWIDTH] IN BLOOD BY AUTOMATED COUNT: 12.4 %
FASTING STATUS PATIENT QL REPORTED: YES
GLUCOSE BLD-MCNC: 97 MG/DL (ref 70–99)
HCT VFR BLD AUTO: 33.9 %
HGB BLD-MCNC: 10.9 G/DL
IMM GRANULOCYTES # BLD AUTO: 0.03 X10(3) UL (ref 0–1)
IMM GRANULOCYTES NFR BLD: 0.4 %
LYMPHOCYTES # BLD AUTO: 1.26 X10(3) UL (ref 1–4)
LYMPHOCYTES NFR BLD AUTO: 15.6 %
MCH RBC QN AUTO: 29.8 PG (ref 26–34)
MCHC RBC AUTO-ENTMCNC: 32.2 G/DL (ref 31–37)
MCV RBC AUTO: 92.6 FL
MONOCYTES # BLD AUTO: 1.05 X10(3) UL (ref 0.1–1)
MONOCYTES NFR BLD AUTO: 13 %
NEUTROPHILS # BLD AUTO: 5.32 X10 (3) UL (ref 1.5–7.7)
NEUTROPHILS # BLD AUTO: 5.32 X10(3) UL (ref 1.5–7.7)
NEUTROPHILS NFR BLD AUTO: 65.9 %
OSMOLALITY SERPL CALC.SUM OF ELEC: 292 MOSM/KG (ref 275–295)
PLATELET # BLD AUTO: 221 10(3)UL (ref 150–450)
POTASSIUM SERPL-SCNC: 5 MMOL/L (ref 3.5–5.1)
RBC # BLD AUTO: 3.66 X10(6)UL
SODIUM SERPL-SCNC: 137 MMOL/L (ref 136–145)
WBC # BLD AUTO: 8.1 X10(3) UL (ref 4–11)

## 2025-01-25 PROCEDURE — 85025 COMPLETE CBC W/AUTO DIFF WBC: CPT

## 2025-01-25 PROCEDURE — 80048 BASIC METABOLIC PNL TOTAL CA: CPT

## 2025-01-25 PROCEDURE — 36415 COLL VENOUS BLD VENIPUNCTURE: CPT

## 2025-01-25 PROCEDURE — 71046 X-RAY EXAM CHEST 2 VIEWS: CPT | Performed by: INTERNAL MEDICINE

## 2025-01-29 ENCOUNTER — HOSPITAL ENCOUNTER (OUTPATIENT)
Dept: INTERVENTIONAL RADIOLOGY/VASCULAR | Facility: HOSPITAL | Age: 60
Discharge: HOME | End: 2025-01-29
Attending: INTERNAL MEDICINE | Admitting: INTERNAL MEDICINE
Payer: COMMERCIAL

## 2025-01-29 VITALS
HEART RATE: 71 BPM | RESPIRATION RATE: 19 BRPM | WEIGHT: 250 LBS | BODY MASS INDEX: 46.01 KG/M2 | SYSTOLIC BLOOD PRESSURE: 123 MMHG | OXYGEN SATURATION: 94 % | DIASTOLIC BLOOD PRESSURE: 61 MMHG | HEIGHT: 62 IN | TEMPERATURE: 98 F

## 2025-01-29 DIAGNOSIS — Z01.818 PRE-OP TESTING: ICD-10-CM

## 2025-01-29 DIAGNOSIS — Z94.1 H/O HEART TRANSPLANT (HCC): Primary | ICD-10-CM

## 2025-01-29 DIAGNOSIS — Z94.1 HEART TRANSPLANT RECIPIENT (HCC): ICD-10-CM

## 2025-01-29 LAB
ISTAT ACTIVATED CLOTTING TIME: 199 SECONDS (ref 74–137)
ISTAT ACTIVATED CLOTTING TIME: 262 SECONDS (ref 74–137)

## 2025-01-29 PROCEDURE — 85347 COAGULATION TIME ACTIVATED: CPT

## 2025-01-29 PROCEDURE — B2141ZZ FLUOROSCOPY OF RIGHT HEART USING LOW OSMOLAR CONTRAST: ICD-10-PCS | Performed by: INTERNAL MEDICINE

## 2025-01-29 PROCEDURE — 4A023N8 MEASUREMENT OF CARDIAC SAMPLING AND PRESSURE, BILATERAL, PERCUTANEOUS APPROACH: ICD-10-PCS | Performed by: INTERNAL MEDICINE

## 2025-01-29 PROCEDURE — 92978 ENDOLUMINL IVUS OCT C 1ST: CPT | Performed by: INTERNAL MEDICINE

## 2025-01-29 PROCEDURE — 93456 R HRT CORONARY ARTERY ANGIO: CPT | Performed by: INTERNAL MEDICINE

## 2025-01-29 PROCEDURE — 92979 ENDOLUMINL IVUS OCT C EA: CPT | Performed by: INTERNAL MEDICINE

## 2025-01-29 PROCEDURE — 99153 MOD SED SAME PHYS/QHP EA: CPT | Performed by: INTERNAL MEDICINE

## 2025-01-29 PROCEDURE — B2111ZZ FLUOROSCOPY OF MULTIPLE CORONARY ARTERIES USING LOW OSMOLAR CONTRAST: ICD-10-PCS | Performed by: INTERNAL MEDICINE

## 2025-01-29 PROCEDURE — 99152 MOD SED SAME PHYS/QHP 5/>YRS: CPT | Performed by: INTERNAL MEDICINE

## 2025-01-29 RX ORDER — IODIXANOL 320 MG/ML
100 INJECTION, SOLUTION INTRAVASCULAR
Status: COMPLETED | OUTPATIENT
Start: 2025-01-29 | End: 2025-01-29

## 2025-01-29 RX ORDER — LIDOCAINE HYDROCHLORIDE 10 MG/ML
INJECTION, SOLUTION EPIDURAL; INFILTRATION; INTRACAUDAL; PERINEURAL
Status: COMPLETED
Start: 2025-01-29 | End: 2025-01-29

## 2025-01-29 RX ORDER — HEPARIN SODIUM 5000 [USP'U]/ML
INJECTION, SOLUTION INTRAVENOUS; SUBCUTANEOUS
Status: DISCONTINUED
Start: 2025-01-29 | End: 2025-01-29 | Stop reason: WASHOUT

## 2025-01-29 RX ORDER — SODIUM CHLORIDE 9 MG/ML
INJECTION, SOLUTION INTRAVENOUS
Status: COMPLETED | OUTPATIENT
Start: 2025-01-29 | End: 2025-01-29

## 2025-01-29 RX ORDER — MIDAZOLAM HYDROCHLORIDE 1 MG/ML
INJECTION INTRAMUSCULAR; INTRAVENOUS
Status: COMPLETED
Start: 2025-01-29 | End: 2025-01-29

## 2025-01-29 RX ORDER — BIVALIRUDIN 250 MG/5ML
INJECTION, POWDER, LYOPHILIZED, FOR SOLUTION INTRAVENOUS
Status: COMPLETED
Start: 2025-01-29 | End: 2025-01-29

## 2025-01-29 RX ADMIN — IODIXANOL 70 ML: 320 INJECTION, SOLUTION INTRAVASCULAR at 12:40:00

## 2025-01-29 RX ADMIN — SODIUM CHLORIDE: 9 INJECTION, SOLUTION INTRAVENOUS at 11:18:00

## 2025-01-29 NOTE — H&P
[facility]   History and Physical    Landon Alexis Patient Status:  Outpatient    1965 MRN [id number]   [facility] INTERVENTIONAL SUITES Attending Lv Parks MD   Hosp Day # 0 PCP Yudi Herrera MD        HPI: The patient is scheduled for RHC, LHC and intracoronary ultrasound of LMCA and LAD artery to assess for allograft vasculopathy in transplanted heart as a part of annual check in heart transplant patient. Pt has alergy to heparin gtt and will use IV angiomax for IVUS in cath lab.        PROBLEMS  Reconcile with Patient's Chart  PROBLEMS  Problem Effective Dates Date resolved Problem Status   COVID-19, [SNOMED-CT: 398628506] 2022 -  Active   Leukocytopenia, unspecified, [SNOMED-CT: 79499738] 5/3/2022 -  Active   Follow-up examination after heart transplant, [SNOMED-CT: 475940798] 2/3/2022 -  Active   Screening for osteoporosis, [SNOMED-CT: 852032025] 2/3/2022 -  Active   Immunodeficiency due to long term immunosuppressive drug therapy, [SNOMED-CT: 45466278263571114] 2/3/2022 -  Active   Encounter for monitoring cardiotoxic drug therapy, [SNOMED-CT: 227428228] 2022 -  Active   H/O heart transplant, [SNOMED-CT: 570326362] 10/22/2021 -  Active   Fatigue, [SNOMED-CT: 75779129] 10/22/2021 -  Active   Vitamin D deficiency, [SNOMED-CT: 37114604] 10/22/2021 -  Active   Palpitations, [SNOMED-CT: 70413987] 2021 -  Active   Obesity, Class III, BMI 40-49.9 (morbid obesity), [SNOMED-CT: 063498180] 2021 -  Active   Complications of transplanted heart, [SNOMED-CT: 159566886] 2021 -  Active   Hypothyroid, [SNOMED-CT: 16961986] 2019 -  Active   Hyperlipoproteinemia, [SNOMED-CT: 9928436] 2019 -  Active   Folic acid deficiency, [SNOMED-CT: 165144713] 2019 -  Active   Fatty liver, [SNOMED-CT: 192304446] 2019 -  Active   Atrial fibrillation, [SNOMED-CT: 25095614] 2019 -  Active   Anemia, [SNOMED-CT: 293381729] 2019 -  Active    Fatty liver, [SNOMED-CT: 970167820] 4/28/2014 -  Active   Proteinuria, [SNOMED-CT: 12745491] 4/28/2014 -  Active   Mixed hyperlipidemia, [SNOMED-CT: 562643536] 4/28/2014 -  Active   Heart replaced by transplant, [SNOMED-CT: 776478898] 11/23/2018 -  Active   Elevated brain natriuretic peptide (BNP) level, [SNOMED-CT: 554988949] 9/9/2020 -  Active   CKD (chronic kidney disease) stage 3, GFR 30-59 ml/min, [SNOMED-CT: 876766998] 9/9/2020 -  Active   Chronic anemia, [SNOMED-CT: 857958024] 12/20/2021 -  Active        VITAL SIGNS     VITAL SIGNS        BP Systolic 128 mmHg   BP Diastolic 84 mmHg   Height 62 inches   Weight 240 lbs   Pulse Rate 74 bpm   BSA (Body Surface Area) 2.1 cc/m2   BMI (Body Mass Index) 45          PHYSICAL EXAMINATION     PHYSICAL EXAMINATION  Header Details       General Appearance No Acute Distress, Well groomed, Appropriate, Obese   Head/Eyes/Ears/Nose/Mouth/Throat Conjunctiva pink, Sclera Clear, EOMI, PERRLA, Mucous membranes Moist, No pallor, No icterus, Good Dentition   Neck Normal carotid pulsations, No carotid bruits, Normal thyroid palpation without goiter, No JVD   Respiratory Unlabored, Lungs clear with normal breath sounds, Equal bilaterally   Cardiovascular Intact distal pulses, Regular rhythm. Normal rate present. Normal and normal S1 and S2   Gastrointestinal Abdomen soft, Non-tender, Normoactive bowel sounds, No organomegaly, No masses, No pulsations   Musculoskeletal Normal spine   Gait Normal gait   Strength and tone Normal muscle strength, Normal muscle tone   Upper Extremities No clubbing, No cyanosis, No edema   Lower Extremities Pulses 2+ and equal bilaterally, No edema   Skin Warm and dry, Intact, Good turgor, No rashes or lesions   Neurologic / Psychiatric Alert and Oriented, Non-focal   Speech Normal speech      ALLERGIES, ADVERSE REACTIONS, ALERTS  Reconcile with Patient's Chart  ALLERGIES, ADVERSE REACTIONS, ALERTS  Type Substance Reaction Severity Status   Allergies  Heparin, [RxNorm: 234269]   Mild Active      MEDICATIONS ADMINISTERED DURING VISIT     No data available     MEDICATIONS  Reconcile with Patient's Chart  MEDICATIONS  Medication Start Date Route/Frequency Status   aspirin 81 MG EC tablet, [RxNorm: 102010] 7/8/2021 Take 81 mg by mouth daily.  Active   Atorvastatin Calcium 20 MG Oral Tab, [RxNorm: 753192] 6/28/2022 Take 20 mg by mouth nightly. Active   buPROPion 150 MG Oral Tablet 24 Hr, [RxNorm: 604258] 6/28/2022 Take 1 tablet (150 mg total) by mouth daily. Active   Calcium Citrate-Vitamin D 315-200 MG-UNIT Oral Tab, [RxNorm: 1635182] 6/28/2022 Take 1 tablet by mouth 2 (two) times daily. Active   CellCept 250 mg capsule, [RxNorm: 293340] 3/17/2022 Take 1 capsule orally every 12 hours. Active   clindamycin (CLEOCIN) 300 MG capsule, [RxNorm: 727933] 7/8/2021 Take two capsules 30 to 60 minutes prior to procedure. Active   Desoximetasone 0.25 % External Cream, [RxNorm: 394093] 6/28/2022 Apply 1 g topically 2 (two) times daily as needed. Active   esomeprazole (NEXIUM) 20 MG capsule, [RxNorm: 929974] 7/8/2021 Take by mouth daily (before breakfast).  Active   folic acid 1 MG Oral Tab, [RxNorm: 702197] 6/28/2022 Take 1 tablet (1 mg total) by mouth daily. Active   hydrALAZINE 50 mg tablet, [RxNorm: 512469] 7/8/2022 Take 1 tablet orally 2 times a day. Active   iron 325 mg (65 mg iron) tablet, [RxNorm: 118240] 6/28/2022 Take 1 tablet orally once a day. Active   levothyroxine 88 MCG Oral Tab, [RxNorm: 318533] 6/28/2022 Take 1 tablet (88 mcg total) by mouth daily. Active   losartan 50 mg tablet, [RxNorm: 742858] 7/26/2022 Take 1 tablet orally once a day. Active   magnesium oxide 400 MG Oral Tab, [RxNorm: 302991] 6/28/2022 Take 800 mg by mouth 2 (two) times daily.  Active   metoprolol succinate ER 50 mg tablet,extended release 24 hr, [RxNorm: 081550] 6/28/2022 Take 1 tablet orally once in the evening. Active   metoprolol tartrate 25 mg tablet, [RxNorm: 513641] 6/28/2022 Take 1  tablet orally every 12 hours as needed for palpitations Active   multivitamin Oral Tab 6/28/2022 Take 1 tablet by mouth daily. Active   tacrolimus 1 mg capsule, immediate-release, [RxNorm: 627816] 9/28/2022 Take 2 capsules orally 2 times a day. Active                  FAMILY HISTORY     FAMILY HISTORY  Relationship Age Diagnosis   Father 42 Past heart attack (Reason for death)    Mother 0 Hypertension (HTN), primary; History of breast cancer - Hx       GENERAL STATUS     No data available     PAST MEDICAL HISTORY     PAST MEDICAL HISTORY  Problem Date diagonsed Date resolved Status   COVID-19, [SNOMED-CT: 623078710] 9/28/2022 -  Active   Leukocytopenia, unspecified, [SNOMED-CT: 86412979] 5/3/2022 -  Active   Follow-up examination after heart transplant, [SNOMED-CT: 830560058] 2/3/2022 -  Active   Screening for osteoporosis, [SNOMED-CT: 126795579] 2/3/2022 -  Active   Immunodeficiency due to long term immunosuppressive drug therapy, [SNOMED-CT: 95250212648335608] 2/3/2022 -  Active   Encounter for monitoring cardiotoxic drug therapy, [SNOMED-CT: 763776879] 1/14/2022 -  Active   H/O heart transplant, [SNOMED-CT: 219606622] 10/22/2021 -  Active   Fatigue, [SNOMED-CT: 29896398] 10/22/2021 -  Active   Vitamin D deficiency, [SNOMED-CT: 03811941] 10/22/2021 -  Active   Palpitations, [SNOMED-CT: 51211841] 7/8/2021 -  Active   Obesity, Class III, BMI 40-49.9 (morbid obesity), [SNOMED-CT: 010011238] 7/8/2021 -  Active   Hypothyroid, [SNOMED-CT: 49660742] 9/20/2019 -  Active   Hyperlipoproteinemia, [SNOMED-CT: 7429091] 9/25/2019 -  Active   Folic acid deficiency, [SNOMED-CT: 120513034] 9/25/2019 -  Active   Fatty liver, [SNOMED-CT: 115816324] 9/25/2019 -  Active   Atrial fibrillation, [SNOMED-CT: 73983548] 9/25/2019 -  Active   Anemia, [SNOMED-CT: 470656291] 9/25/2019 -  Active   Fatty liver, [SNOMED-CT: 660122772] 4/28/2014 -  Active   Proteinuria, [SNOMED-CT: 75296826] 4/28/2014 -  Active   Mixed hyperlipidemia, [SNOMED-CT:  384271087] 4/28/2014 -  Active   Heart replaced by transplant, [SNOMED-CT: 680825352] 11/23/2018 -  Active   Elevated brain natriuretic peptide (BNP) level, [SNOMED-CT: 018958677] 9/9/2020 -  Active   CKD (chronic kidney disease) stage 3, GFR 30-59 ml/min, [SNOMED-CT: 619306922] 9/9/2020 -  Active   Chronic anemia, [SNOMED-CT: 339838820] 12/20/2021 -  Active          Impression:  1.     Status post orthotopic heart transplantation December 2016.  2.     Chronic kidney disease stage 3.  3.     Diabetes mellitus type 2.  4.     Hypercholesterolemia.  5.     Folic acid and iron-deficiency anemia.  6.     Obesity.     Plan:  The patient is scheduled for RHC, LHC and intracoronary ultrasound of LMCA and LAD artery to assess for allograft vasculopathy in transplanted heart as a part of annual check in heart transplant patient. Pt has alergy to heparin gtt and will use IV angiomax for IVUS in cath lab.     Patient was consented. The risks and benefits of the procedure were explained to the patient. 1-2% risk of coronary angiography, possible angioplasty, include, but are not limited to stroke, death, heart attack, coronary dissection requiring emergent CABG, hematoma, bleeding, allergic reaction to medications, vascular damage requiring surgical repair, kidney failure requiring dialysis and others. Patient wishes to proceed.     D/w pt and the Nurses     Louie Parks M.D., F.A.C.C.  Interventional Cardiology  Advanced Heart Failure  [facility]     01/29/2025

## 2025-01-29 NOTE — PROGRESS NOTES
Pt received s/p [facility] with Dr. Parks. Right femoral arterial access site closed with angioseal. Right femoral venous sheath in place, sheath discontinued and manual pressure held until hemostasis achieved. Right groin CDI, no bleeding or hematoma present. Dr. Parks at bedside and spoke with pt and pt's sister. Recovery complete. Discharge instructions given to pt and pt's sister. IV discontinued, pt taken down to [facility] via wheelchair for discharge.

## 2025-01-30 NOTE — PROCEDURES
[facility]    PATIENT'S NAME: AUDREY BADILLO   ATTENDING PHYSICIAN: Lv Parks M.D.   OPERATING PHYSICIAN: Lv Parks M.D.   PATIENT ACCOUNT#:   [id number]    LOCATION:  [address]  MEDICAL RECORD #:   [id number]       YOB: 1965  ADMISSION DATE:       01/29/2025      OPERATION DATE:  01/29/2025    CARDIAC PROCEDURE TRANSCRIPTION      CARDIAC CATHETERIZATION     PREOPERATIVE DIAGNOSIS:    1.   Status post orthotopic heart transplantation in December 2016.  2.   Chronic kidney disease.  3.   Hypertension.  4.   Dyslipidemia.  5.   Diabetes mellitus type 2.  6.   Morbid obesity.  7.   Folic acid and iron deficiency anemia.  POSTOPERATIVE DIAGNOSIS:    1.   Status post orthotopic heart transplantation in December 2016.  2.   Chronic kidney disease.  3.   Hypertension.  4.   Dyslipidemia.  5.   Diabetes mellitus type 2.  6.   Morbid obesity.  7.   Folic acid and iron deficiency anemia.  PROCEDURE PERFORMED:    1.   Selective coronary angiography.  2.   Right heart catheterization with saturations.  3.   Intracoronary ultrasound of left anterior descending artery.  4.   Intracoronary ultrasound of left main coronary artery.      SEDATION:  We performed moderate conscious sedation with IV Versed and IV fentanyl.  The time of first sedation dose was 11:49 a.m. and the procedure ended at 12:39 p.m.  Blood pressure, pulse ox, and heart rate were monitored all the time by the nurse and myself, and IV line was checked by the nurse and myself.    PROCEDURE DESCRIPTION:  After written informed consent was obtained from the patient, she was brought to cardiac catheterization laboratory.  She was prepped and draped in usual sterile fashion.  Lidocaine 1% was used to infiltrate her right groin for local anesthesia.  A 6-Maltese introducer sheath was inserted into right femoral artery.  We used micropuncture kit, and then we exchanged this for a 6-Maltese sheath.  A 7-Maltese  introducer sheath was inserted into the right femoral vein using modified Seldinger technique with no difficulties.    Right heart catheterization was performed using pulmonary capillary wedge pressure catheter.  We measured right heart pressures and calculated cardiac output using a Rubens equation on room air.  We obtained saturations from pulmonary artery, right atrium, and aorta to screen for any intracardiac shunt on room air.     Selective coronary angiography was performed using 6-Norwegian FR4 diagnostic catheter for the right coronary arterial system and a 6-Norwegian JL4 diagnostic catheter for left coronary arterial system.  We did not performed left ventriculogram due to kidney insufficiency in patient with heart transplant for kidney safety.  The patient has preserved LV systolic function by echo imaging study.    The patient has allergy to IV heparin, and we used IV Angiomax bolus for intracoronary ultrasound exam.    We performed intracoronary ultrasound using [place] ultrasound catheter to assess for any allograft vasculopathy.  A 6-Norwegian JL4 guide catheter was already in the ostium of left main coronary artery.  A coronary Runthrough wire was used for intracoronary ultrasound exam.  It was advanced to the distal LAD artery with no difficulties.  Intracoronary ultrasound was advanced over the coronary wire.  We performed manual pullback and measured minimal luminal areas and minimal luminal dimensions to assess for allograft vasculopathy.      Final angiography of the left coronary arterial system was performed and reviewed.  There was no bleeding or hematoma or thrombus or distal embolization angiographically.    Right femoral arterial sheathogram was performed and was acceptable for percutaneous closure using 6-Norwegian Angio-Seal closure device.  There was no bleeding or hematoma in the right groin.     Right femoral venous sheath was secured to the skin since ACT was 264 seconds.  Right femoral  venous sheath will be pulled out in holding area later when ACT is less than 180 seconds.    RIGHT HEART CATHETERIZATION HEMODYNAMICS:  Right atrial pressure 9 mmHg.  Right ventricular pressure 34/4/8 mmHg.  Pulmonary artery pressure 35/11/22 mmHg.  Wedge pressure 12 mmHg.  Transpulmonary gradient was 10 mmHg.     Rubens cardiac output 8.6 L/minute, with cardiac index of 4.1 L/minute per sq m.  Pulmonary vascular resistance 1.2 Wood units.    Saturations:  93.7% in aorta, 69.9% in pulmonary artery, and 71.6% in right atrium on room air with hemoglobin sampling of 9.5.    Central aortic pressure 127/72 mmHg with heart rate in 60s.  Patient stayed in sinus with no prognostically significant arrhythmia.    SELECTIVE CORONARY ANGIOGRAPHY FINDINGS:    1.   Left main coronary artery had no disease angiographically.  2.   LAD artery and major diagonal branches had no disease angiographically.  3.   Left circumflex artery and major OM branches had no disease angiographically.  4.   Right coronary artery was a dominant system with no disease angiographically.  5.   Right posterior descending artery and posterolateral branches had no disease angiographically.    INTRACORONARY ULTRASOUND EXAM:  Mid LAD artery had no intimal hyperplasia by ultrasound.  Vessel area was 8.4 sq mm, with vessel dimension of 2.8 x 2.7 mm.    Proximal LAD artery had minimal intimal hyperplasia, and it was circumferential with vessel area of 16.1 sq mm and vessel dimension of 4.2 x 4.1 mm.  There was small superficial calcification only at the ostium of LAD artery, which was nonsignificant and extending to the very distal segment of left main on the opposite side of circumflex ostium.    Left main coronary artery had mild eccentric calcified plaque right at the end extending to the ostium of LAD, which was nonsignificant.  Left main coronary artery otherwise with no intimal hyperplasia.  Left main coronary artery vessel area was 24.6 sq mm, with vessel  dimension of 5.3 x 5.5 mm.  Minimal luminal area at the level of calcified plaque was 19.7 sq mm.      IMPRESSION:    1.   Normal coronary arteries angiographically.  2.   Dominant right coronary arterial system.  3.   Intracoronary ultrasound revealed minimal circumferential intimal hyperplasia in proximal left anterior descending with very small superficial calcifications at the ostium of left anterior descending, nonsignificant.  Otherwise, no intimal hyperplasia in remaining segments of left anterior descending or left main.  4.   Overall stable coronary angiography and intracoronary ultrasound as compared to last one from February 2024.  5.   No left ventriculogram was performed due to kidney insufficiency in heart transplant patient for kidney safety.  6.   Right heart catheterization was unremarkable with normal LV and RV filling pressures with pulmonary pressures at the upper limit of normal and normal cardiac output with no shunt.  Normal transpulmonary gradient with normal pulmonary vascular resistance.  7.   The patient has allergy to heparin, and we used IV Angiomax bolus for intracoronary ultrasound exam for this procedure.   8.   Hemostasis of the right femoral artery was achieved with 6-Irish Angio-Seal closure device.    PLAN:    1.   Continue medical management with antirejection medications for heart transplantation.    2.   IV hydration for kidney insufficiency after procedure in heart transplantation.   3.   Check ACT in 45 minutes and pull the right femoral venous sheath if ACT is less than 180 seconds.  4.   Antirejection medications as scheduled.  5.   Follow up at [facility] within the next few weeks.    Thank you for allowing me to participate in this patient's care.  Should you have any questions, feel free to contact me.  The above findings were discussed with the patient and her sister, Zoe.    All discussed with the nurses.    Dictated By Lv Parks  M.D.  d: 01/29/2025 13:03:03  t: 01/29/2025 17:44:56  [facility] [id number]  /

## 2025-02-06 ENCOUNTER — ORDER TRANSCRIPTION (OUTPATIENT)
Dept: ADMINISTRATIVE | Facility: HOSPITAL | Age: 60
End: 2025-02-06

## 2025-02-06 DIAGNOSIS — D84.821 IMMUNODEFICIENCY DUE TO LONG TERM IMMUNOSUPPRESSIVE DRUG THERAPY (HCC): ICD-10-CM

## 2025-02-06 DIAGNOSIS — Z51.81 ENCOUNTER FOR MONITORING CARDIOTOXIC DRUG THERAPY: Primary | ICD-10-CM

## 2025-02-06 DIAGNOSIS — T45.1X5A IMMUNODEFICIENCY DUE TO LONG TERM IMMUNOSUPPRESSIVE DRUG THERAPY (HCC): ICD-10-CM

## 2025-02-06 DIAGNOSIS — Z79.899 ENCOUNTER FOR MONITORING CARDIOTOXIC DRUG THERAPY: Primary | ICD-10-CM

## 2025-02-06 DIAGNOSIS — Z94.1 HEART TRANSPLANT STATUS (HCC): ICD-10-CM

## 2025-02-06 DIAGNOSIS — Z79.899 IMMUNODEFICIENCY DUE TO LONG TERM IMMUNOSUPPRESSIVE DRUG THERAPY (HCC): ICD-10-CM

## 2025-07-05 ENCOUNTER — HOSPITAL ENCOUNTER (OUTPATIENT)
Dept: BONE DENSITY | Age: 60
Discharge: HOME | End: 2025-07-05
Attending: INTERNAL MEDICINE
Payer: COMMERCIAL

## 2025-07-05 DIAGNOSIS — Z78.0 OSTEOPENIA AFTER MENOPAUSE: ICD-10-CM

## 2025-07-05 DIAGNOSIS — Z79.899 IMMUNODEFICIENCY DUE TO DRUG THERAPY (HCC): ICD-10-CM

## 2025-07-05 DIAGNOSIS — M85.80 OSTEOPENIA AFTER MENOPAUSE: ICD-10-CM

## 2025-07-05 DIAGNOSIS — Z94.1 HEART TRANSPLANT STATUS (HCC): ICD-10-CM

## 2025-07-05 DIAGNOSIS — D84.821 IMMUNODEFICIENCY DUE TO DRUG THERAPY (HCC): ICD-10-CM

## 2025-07-05 DIAGNOSIS — Z79.899 ENCOUNTER FOR MONITORING CARDIOTOXIC DRUG THERAPY: ICD-10-CM

## 2025-07-05 DIAGNOSIS — Z51.81 ENCOUNTER FOR MONITORING CARDIOTOXIC DRUG THERAPY: ICD-10-CM

## 2025-07-05 PROCEDURE — 77080 DXA BONE DENSITY AXIAL: CPT | Performed by: RADIOLOGY

## 2025-07-10 ENCOUNTER — LAB ENCOUNTER (OUTPATIENT)
Dept: LAB | Facility: HOSPITAL | Age: 60
End: 2025-07-10
Attending: INTERNAL MEDICINE
Payer: COMMERCIAL

## 2025-07-10 DIAGNOSIS — Z51.81 ENCOUNTER FOR THERAPEUTIC DRUG MONITORING: ICD-10-CM

## 2025-07-10 DIAGNOSIS — Z48.21 FOLLOW-UP EXAMINATION AFTER HEART TRANSPLANT (HCC): ICD-10-CM

## 2025-07-10 DIAGNOSIS — R00.2 PALPITATIONS: ICD-10-CM

## 2025-07-10 DIAGNOSIS — E66.813 OBESITY, CLASS III, BMI 40-49.9 (MORBID OBESITY): ICD-10-CM

## 2025-07-10 DIAGNOSIS — Z94.1 STATUS POST HEART TRANSPLANT (HCC): ICD-10-CM

## 2025-07-10 DIAGNOSIS — R53.83 FATIGUE, UNSPECIFIED TYPE: ICD-10-CM

## 2025-07-10 DIAGNOSIS — I48.91 ATRIAL FIBRILLATION, UNSPECIFIED TYPE (HCC): ICD-10-CM

## 2025-07-10 DIAGNOSIS — N18.30 CKD (CHRONIC KIDNEY DISEASE) STAGE 3, GFR 30-59 ML/MIN (HCC): ICD-10-CM

## 2025-07-10 DIAGNOSIS — D64.9 ANEMIA: Primary | ICD-10-CM

## 2025-07-10 DIAGNOSIS — D68.59 HYPERCOAGULABLE STATE (HCC): ICD-10-CM

## 2025-07-10 LAB
ALBUMIN SERPL-MCNC: 4 G/DL (ref 3.2–4.8)
ALBUMIN/GLOB SERPL: 1.3 {RATIO} (ref 1–2)
ALP LIVER SERPL-CCNC: 163 U/L (ref 46–118)
ALT SERPL-CCNC: 29 U/L (ref 10–49)
ANION GAP SERPL CALC-SCNC: 3 MMOL/L (ref 0–18)
AST SERPL-CCNC: 36 U/L (ref ?–34)
BASOPHILS # BLD AUTO: 0.02 X10(3) UL (ref 0–0.2)
BASOPHILS NFR BLD AUTO: 0.3 %
BILIRUB SERPL-MCNC: 0.4 MG/DL (ref 0.2–1.1)
BUN BLD-MCNC: 24 MG/DL (ref 9–23)
CALCIUM BLD-MCNC: 9.3 MG/DL (ref 8.7–10.6)
CHLORIDE SERPL-SCNC: 107 MMOL/L (ref 98–112)
CO2 SERPL-SCNC: 30 MMOL/L (ref 21–32)
CREAT BLD-MCNC: 1.54 MG/DL (ref 0.55–1.02)
EGFRCR SERPLBLD CKD-EPI 2021: 38 ML/MIN/1.73M2 (ref 60–?)
EOSINOPHIL # BLD AUTO: 0.35 X10(3) UL (ref 0–0.7)
EOSINOPHIL NFR BLD AUTO: 4.9 %
ERYTHROCYTE [DISTWIDTH] IN BLOOD BY AUTOMATED COUNT: 13.7 %
EST. AVERAGE GLUCOSE BLD GHB EST-MCNC: 111 MG/DL (ref 68–126)
FASTING STATUS PATIENT QL REPORTED: YES
GLOBULIN PLAS-MCNC: 3.1 G/DL (ref 2–3.5)
GLUCOSE BLD-MCNC: 93 MG/DL (ref 70–99)
HBA1C MFR BLD: 5.5 % (ref ?–5.7)
HCT VFR BLD AUTO: 34.3 % (ref 35–48)
HGB BLD-MCNC: 11.1 G/DL (ref 12–16)
IMM GRANULOCYTES # BLD AUTO: 0.02 X10(3) UL (ref 0–1)
IMM GRANULOCYTES NFR BLD: 0.3 %
LYMPHOCYTES # BLD AUTO: 1.11 X10(3) UL (ref 1–4)
LYMPHOCYTES NFR BLD AUTO: 15.5 %
MCH RBC QN AUTO: 28.9 PG (ref 26–34)
MCHC RBC AUTO-ENTMCNC: 32.4 G/DL (ref 31–37)
MCV RBC AUTO: 89.3 FL (ref 80–100)
MONOCYTES # BLD AUTO: 0.8 X10(3) UL (ref 0.1–1)
MONOCYTES NFR BLD AUTO: 11.1 %
NEUTROPHILS # BLD AUTO: 4.88 X10 (3) UL (ref 1.5–7.7)
NEUTROPHILS # BLD AUTO: 4.88 X10(3) UL (ref 1.5–7.7)
NEUTROPHILS NFR BLD AUTO: 67.9 %
NT-PROBNP SERPL-MCNC: 1121 PG/ML (ref ?–125)
OSMOLALITY SERPL CALC.SUM OF ELEC: 294 MOSM/KG (ref 275–295)
PLATELET # BLD AUTO: 215 10(3)UL (ref 150–450)
POTASSIUM SERPL-SCNC: 4.6 MMOL/L (ref 3.5–5.1)
PROT SERPL-MCNC: 7.1 G/DL (ref 5.7–8.2)
RBC # BLD AUTO: 3.84 X10(6)UL (ref 3.8–5.3)
SODIUM SERPL-SCNC: 140 MMOL/L (ref 136–145)
T4 FREE SERPL-MCNC: 1.5 NG/DL (ref 0.8–1.7)
TSI SER-ACNC: 2.22 UIU/ML (ref 0.55–4.78)
WBC # BLD AUTO: 7.2 X10(3) UL (ref 4–11)

## 2025-07-10 PROCEDURE — 85025 COMPLETE CBC W/AUTO DIFF WBC: CPT

## 2025-07-10 PROCEDURE — 83036 HEMOGLOBIN GLYCOSYLATED A1C: CPT

## 2025-07-10 PROCEDURE — 80053 COMPREHEN METABOLIC PANEL: CPT

## 2025-07-10 PROCEDURE — 84443 ASSAY THYROID STIM HORMONE: CPT

## 2025-07-10 PROCEDURE — 80197 ASSAY OF TACROLIMUS: CPT

## 2025-07-10 PROCEDURE — 36415 COLL VENOUS BLD VENIPUNCTURE: CPT

## 2025-07-10 PROCEDURE — 80180 DRUG SCRN QUAN MYCOPHENOLATE: CPT

## 2025-07-10 PROCEDURE — 84439 ASSAY OF FREE THYROXINE: CPT

## 2025-07-10 PROCEDURE — 83880 ASSAY OF NATRIURETIC PEPTIDE: CPT

## 2025-07-13 LAB
MYCOPHENOLIC ACID GLUCURONIDE: 68 MCG/ML
MYCOPHENOLIC ACID: <0.5 MCG/ML
TACROLIMUS LVL: 7.9 NG/ML

## (undated) NOTE — IP AVS SNAPSHOT
[facility] [place] [address] [place] [address], [address] [place] [address] Rd ~ [phone]                Discharge Summary   1/26/2017    Joanie Barnes           Admission Information        Provider Department    1/26/2017 Sherwin Blum MD  Sarah Dwyer [    ]    [    ]    [    ]       Levothyroxine Sodium 88 MCG Tabs   Commonly known as:  SYNTHROID, LEVOTHROID        Take 1 tablet (88 mcg total) by mouth daily.     Charu Myles     [    ]    [    ]    [    ]    [    ]       magnesium oxide 400 MG Ta Why:  call aric at [phone]. i did email her to call you as well.     Contact information:    [address] [address] [phone]        Immunization History as of 1/26/2017  Never Reviewed    I harming yourself, contact [facility] at [phone]. - If you don’t have insurance, Marc Valentine has partnered with Patient [address] to help you get signed up for insurance coverage.   Patient [place] Most common side effects:  Allergic reactions, rash, nausea, diarrhea   What to report to your healthcare team: Tolerance of medications, temperature, rash, itching, shortness of breath, chills, nausea, and diarrhea           Water Pills     furosemide 40 M (itching, rash, hives)   What to report to your healthcare team: Pain, nausea/vomiting, no bowel movement in 2+ days, diarrhea           Steroids     predniSONE 1 MG Oral Tab         Use:  To treat inflammation, to prevent or treat allergic reactions, chemo

## (undated) NOTE — LETTER
[facility] [address], [address]  Consent for Procedure/Sedation    Date:     Time:       1.  I authorize the performance upon Dc Chong the following:cardiac catheterization, left ventricular cineangiography, bilateral selec period, the physician will determine when the applicable recovery period ends for purposes of reinstating the Do Not Resuscitate (DNR) order.     Signature of Patient: ____________________________________________________    Signature of person authorized

## (undated) NOTE — IP AVS SNAPSHOT
[facility] [place] [address] [place], [address] ~ [phone]                Discharge Summary   2/23/2017    [facility]           Admission Information        Provider Department    2/23/2017 Heladio Torres MD  Diego Kent Commonly known as:  [facility] 1 TABLET BY MOUTH EVERY DAY    Pita Dill     [    ]    [    ]    [    ]    [    ]       furosemide 20 MG Tabs   Commonly known as:  LASIX        Take 40 mg by mouth daily.       [    ]    [    ]    [    ]    [ [    ]       tacrolimus 0.5 MG Caps   Commonly known as:  PROGRAF        Take 3.5 mg by mouth daily. [    ]    [    ]    [    ]    [    ]       tacrolimus 1 MG Caps   Commonly known as:  PROGRAF        Take 3 mg by mouth every evening.       [    ] 90.3 (01/25/17)  6.3 (01/25/17)  2.0 (01/25/17)  0.1 (01/25/17)  0.2 -- (01/25/17)  10.34 (H) (01/25/17)  0.72 (L) (01/25/17)  0.23 (01/25/17)  0.01 (01/25/17)  3.00      Metabolic Lab Results  (Last result in the past 90 days)    HgbA1C Glucose BUN Creati Summaries. If you've been to the Emergency Department or your doctor's office, you can view your past visit information in [facility] by going to Visits < Visit Summaries. [facility] questions? Call [phone] for help.   [facility] is NOT to be used for urge Most common side effects: Dizziness, loss of potassium (increased urination is expected)   What to report to your healthcare team: Dizziness, decreased urine amount           Cholesterol Lowering Medications     Atorvastatin Calcium 20 MG Oral Tab       Us Most common side effects: Headache, nasal irritation, palpitations, increased heart rate, increased blood pressure, allergic reaction, yeast infection of the mouth/tongue   What to report to your healthcare provider: Head or mouth pain, coating on mouth/to

## (undated) NOTE — IP AVS SNAPSHOT
[facility] [place]  [address] [place], [address] ~ [phone]                Discharge Summary   4/25/2017    Crissy Billy           Admission Information        Provider Department    4/25/2017 Darrell Maciel MD  Emerson Ross [    ]       CALCITRATE PLUS D 315-200 MG-UNIT Tabs   Generic drug:  Calcium Citrate-Vitamin D        Take 1 tablet by mouth 2 (two) times daily.       [    ]    [    ]    [    ]    [    ]       Eduarda Degroot 60 MG Cpdr   Generic drug:  Arlin Sherman valGANciclovir HCl 450 MG Tabs   Commonly known as:  VALCYTE        Take 450 mg by mouth daily. [    ]    [    ]    [    ]    [    ]       zinc sulfate 220 MG Caps   Commonly known as:  ZINCATE        Take 220 mg by mouth daily.       [    ]    [    ] Metabolic Lab Results  (Last result in the past 90 days)    HgbA1C Glucose BUN Creatinine Calcium Alkaline Phosph AST    -- (04/25/17)  92 (04/25/17)  37 (H) (04/25/17)  1.31 (H) (04/25/17)  8.8 (04/25/17)  101 (04/25/17)  25      Metabolic Lab Results  (L Visits < Visit Summaries. MyChart questions? Call [phone] for help. MyChart is NOT to be used for urgent needs.   For medical emergencies, dial 911.             _____________________________________________________________________________    Aide Adams Cholesterol Lowering Medications     Atorvastatin Calcium 20 MG Oral Tab       Use: Lower cholesterol, protect your heart   Most common side effects: Dizziness, constipation, abnormal liver function   What to report to your healthcare team:  Dizziness, mus What to report to your healthcare provider: Increase in blood sugar, high blood pressure, fluid retention, mood changes, stomach upset/pain, headache, dizziness           Endocrine Medications     Levothyroxine Sodium 88 MCG Oral Tab         Use: Regulate

## (undated) NOTE — LETTER
[facility] [address], [address]  Consent for Procedure/Sedation    Date:     Time:       1.  I authorize the performance upon Christia December the following:cardiac catheterization, left ventricular cineangiography, bilateral selec period, the physician will determine when the applicable recovery period ends for purposes of reinstating the Do Not Resuscitate (DNR) order.     Signature of Patient: ____________________________________________________    Signature of person authorized

## (undated) NOTE — LETTER
[facility] [address], [address]  Consent for Procedure/Sedation    Date:     Time:       1.  I authorize the performance upon Marietta Burrows the following:cardiac catheterization, left ventricular cineangiography, bilateral selec period, the physician will determine when the applicable recovery period ends for purposes of reinstating the Do Not Resuscitate (DNR) order.     Signature of Patient: ____________________________________________________    Signature of person authorized

## (undated) NOTE — LETTER
[facility] [address], [address]  Consent for Procedure/Sedation    Date:     Time:       1.  I authorize the performance upon Lawson Ayers the following:cardiac catheterization, left ventricular cineangiography, bilateral selec period, the physician will determine when the applicable recovery period ends for purposes of reinstating the Do Not Resuscitate (DNR) order.     Signature of Patient: ____________________________________________________    Signature of person authorized